# Patient Record
Sex: MALE | Race: WHITE | NOT HISPANIC OR LATINO | Employment: OTHER | URBAN - METROPOLITAN AREA
[De-identification: names, ages, dates, MRNs, and addresses within clinical notes are randomized per-mention and may not be internally consistent; named-entity substitution may affect disease eponyms.]

---

## 2017-08-04 PROCEDURE — 99283 EMERGENCY DEPT VISIT LOW MDM: CPT

## 2017-08-04 ASSESSMENT — PAIN SCALES - GENERAL: PAINLEVEL_OUTOF10: 0

## 2017-08-05 ENCOUNTER — HOSPITAL ENCOUNTER (EMERGENCY)
Facility: MEDICAL CENTER | Age: 77
End: 2017-08-05
Attending: EMERGENCY MEDICINE
Payer: MEDICARE

## 2017-08-05 VITALS
TEMPERATURE: 97.3 F | RESPIRATION RATE: 18 BRPM | HEIGHT: 67 IN | DIASTOLIC BLOOD PRESSURE: 128 MMHG | HEART RATE: 78 BPM | BODY MASS INDEX: 28.06 KG/M2 | SYSTOLIC BLOOD PRESSURE: 217 MMHG | WEIGHT: 178.79 LBS | OXYGEN SATURATION: 98 %

## 2017-08-05 DIAGNOSIS — S01.01XA SCALP LACERATION, INITIAL ENCOUNTER: ICD-10-CM

## 2017-08-05 PROCEDURE — 304999 HCHG REPAIR-SIMPLE/INTERMED LEVEL 1

## 2017-08-05 PROCEDURE — 305308 HCHG STAPLER,SKIN,DISP.

## 2017-08-05 PROCEDURE — 304217 HCHG IRRIGATION SYSTEM

## 2017-08-05 NOTE — ED NOTES
ERP aware of BP but stated pt was okay to go and will speak with PCP about BP this week.  Pt ambulated well on own out of ER

## 2017-08-05 NOTE — ED NOTES
.  Chief Complaint   Patient presents with   • T-5000 GLF     While loading speakers at work, hit head on van side door railing. Small lac to top of head with gooseegg.  RA.  A/o*4.  -LOC.  -Blood thinners.     Patient educated on triage process and wait time.  Instructed to notify staff for worsening or new symptoms.  Placed in lobby.

## 2017-08-05 NOTE — ED AVS SNAPSHOT
8/5/2017    Morris Deng  [address]  [place] [address]    Dear Morris:    [facility] wants to ensure your discharge home is safe and you or your loved ones have had all of your questions answered regarding your care after you leave the hospital.    Below is a list of resources and contact information should you have any questions regarding your hospital stay, follow-up instructions, or active medical symptoms.    Questions or Concerns Regarding… Contact   Medical Questions Related to Your Discharge  (7 days a week, 8am-5pm) Contact a Nurse Care Coordinator   [phone]   Medical Questions Not Related to Your Discharge  (24 hours a day / 7 days a week)  Contact the Nurse Health Line   [phone]    Medications or Discharge Instructions Refer to your discharge packet   or contact your [facility] Primary Care Provider   [phone]   Follow-up Appointment(s) Schedule your appointment via [employer]   or contact Scheduling [phone]   Billing Review your statement via [employer]  or contact Billing [phone]   Medical Records Review your records via [employer]   or contact Medical Records [phone]     You may receive a telephone call within two days of discharge. This call is to make certain you understand your discharge instructions and have the opportunity to have any questions answered. You can also easily access your medical information, test results and upcoming appointments via the [employer] free online health management tool. You can learn more and sign up at [email or web address]. For assistance setting up your [employer] account, please call [phone].    Once again, we want to ensure your discharge home is safe and that you have a clear understanding of any next steps in your care. If you have any questions or concerns, please do not hesitate to contact us, we are here for you. Thank you for choosing [facility] for your healthcare needs.    Sincerely,    Your [facility] Healthcare Team

## 2017-08-05 NOTE — ED AVS SNAPSHOT
Meeps Access Code: [email or web address]  Expires: 9/4/2017  2:12 AM    Your email address is not on file at [employer].  Email Addresses are required for you to sign up for Meeps, please contact [phone] to verify your personal information and to provide your email address prior to attempting to register for Meeps.    Morris Deng  [address] [place] [place] [address]    Meeps  A secure, online tool to manage your health information     [employer]’s Meeps® is a secure, online tool that connects you to your personalized health information from the privacy of your home -- day or night - making it very easy for you to manage your healthcare. Once the activation process is completed, you can even access your medical information using the Meeps aide, which is available for free in the Apple Aide store or Google Play store.     To learn more about Meeps, visit [email or web address]    There are two levels of access available (as shown below):   My Chart Features  [facility] Primary Care Doctor [facility]  Specialists [facility]  Urgent  Care Non-[facility] Primary Care Doctor   Email your healthcare team securely and privately 24/7 X X X    Manage appointments: schedule your next appointment; view details of past/upcoming appointments X      Request prescription refills. X      View recent personal medical records, including lab and immunizations X X X X   View health record, including health history, allergies, medications X X X X   Read reports about your outpatient visits, procedures, consult and ER notes X X X X   See your discharge summary, which is a recap of your hospital and/or ER visit that includes your diagnosis, lab results, and care plan X X  X     How to register for [employer]:  Once your e-mail address has been verified, follow the following steps to sign up for [employer].     1. Go to  [email or web address]  2. Click on the Sign Up Now box, which takes you to the New Member Sign Up  page. You will need to provide the following information:  a. Enter your [employer] Access Code exactly as it appears at the top of this page. (You will not need to use this code after you’ve completed the sign-up process. If you do not sign up before the expiration date, you must request a new code.)   b. Enter your date of birth.   c. Enter your home email address.   d. Click Submit, and follow the next screen’s instructions.  3. Create a [employer] ID. This will be your [employer] login ID and cannot be changed, so think of one that is secure and easy to remember.  4. Create a [employer] password. You can change your password at any time.  5. Enter your Password Reset Question and Answer. This can be used at a later time if you forget your password.   6. Enter your e-mail address. This allows you to receive e-mail notifications when new information is available in [employer].  7. Click Sign Up. You can now view your health information.    For assistance activating your [employer] account, call [phone]

## 2017-08-05 NOTE — ED PROVIDER NOTES
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 Small lac to top of head with gooseegg.  RA.  A/o*4.  -LOC.  -Blood thinners.       HPI  Morris Deng is a 77 y.o. male who presents for evaluation of scalp laceration. The patient states that he was transferring a speaker to his van after finishing music gag when he tripped on his feet, and impacted his head along the sliding door railing in his van. The patient denies loss of consciousness, denies a history of being on any anticoagulants, and denies headache. He does however state that he has a bit of bleeding over his scalp and due to this decided to present here for evaluation. Notably the patient states that he is up-to-date on his tetanus shot.    REVIEW OF SYSTEMS  See HPI for further details. All other systems are negative.     PAST MEDICAL HISTORY   has a past medical history of Cancer and Hypertension.    SOCIAL HISTORY  Social History     Social History Main Topics   • Smoking status: Former Smoker   • Smokeless tobacco: Not on file      Comment: quit 23 yrs ago   • Alcohol Use: Yes   • Drug Use: No   • Sexual Activity: Not on file       SURGICAL HISTORY   has past surgical history that includes other abdominal surgery.    CURRENT MEDICATIONS  Home Medications     **Home medications have not yet been reviewed for this encounter**          ALLERGIES  Allergies   Allergen Reactions   • Peanut-Derived Anaphylaxis   • Food      Cereals: Barley, Buckwheat , corn, oat, wheat   Meats: turkey  Fruits: apple, cantaloupe/ muskmelon, grape white , lemon, orange, peach, pear, watermelon.  Vegetables: Bean, green; bean, lima; cabbage, carrot,celery, lettuce, pea, pepper, green; soybean  Seafood: lobster , Oyster, shrimp  Nuts/ seeds: Hudson, Cashew, coconut, Filbert/Hazelnut,Peanut, Pecan, Pistachio nut,Sesame seed/oil, walnut,  "flaxseed  Spices/other: Chocolate/ cacao bean, cumin, dill, garlic, mustard,pepper,Histamine control     • Iodine        PHYSICAL EXAM  VITAL SIGNS: /105 mmHg  Pulse 83  Temp(Src) 36.3 °C (97.3 °F) (Temporal)  Resp 18  Ht 1.702 m (5' 7.01\")  Wt 81.1 kg (178 lb 12.7 oz)  BMI 28.00 kg/m2  SpO2 96%   Pulse ox interpretation: I interpret this pulse ox as normal.  Constitutional: Alert in no apparent distress.  HENT: Normocephalic, the vertex of the scalp has a 2 cm laceration over the top, Bilateral external ears normal. Nose normal.   Eyes: Pupils are equal and reactive. Conjunctiva normal, non-icteric.   Heart: Regular rate and rythm.  Lungs: No audible wheezing, no increased work of breathing, no accessory muscle use.  Abdomen: Soft, non-distended, non-tender   Skin: Warm, Dry, No erythema, No rash.   Neurologic: Alert, Grossly non-focal.   Psychiatric: Affect normal, Judgment normal, Mood normal, appears alert and not intoxicated.     Laceration Repair Procedure Note    Indication: Laceration    Procedure: The patient was placed in the appropriate position and anesthesia around the laceration was obtained by infiltration using 1% Lidocaine with epinephrine. The area was then irrigated with high pressure normal saline. The laceration was closed with staples. There were no additional lacerations requiring repair. The wound area was then dressed with a bandage.      Total repaired wound length: 2 cm.     Other Items: Suture count: 3    The patient tolerated the procedure well.    Complications: None    COURSE & MEDICAL DECISION MAKING  Pertinent Labs & Imaging studies reviewed. (See chart for details)    Patient presenting here for scalp laceration after impacting his head against a door jamb. Here the patient is well in appearance, has no physical findings suggestive of serious intracranial injury or even a postconcussive syndrome. The patient does however have a laceration over the top of his scalp which " was repaired as per above. The patient tolerated this well, and he'll be discharged with instructions to return here or to his primary care doctor in 1 week for suture removal.    The patient will not drink alcohol nor drive with prescribed medications. The patient will return for worsening symptoms and is stable at the time of discharge. The patient verbalizes understanding and will comply.    The patient is referred to a primary physician for blood pressure management, diabetic screening, and for all other preventative health concerns, should they be present.    FINAL IMPRESSION  1. Scalp laceration  2. Mechanical ground-level fall  3.         Electronically signed by: Donald Ingram, 8/5/2017 2:09 AM      This record was made with a voice recognition software. I have tried to correct any grammar, spelling or context errors to the best of my ability, but errors may still remain. Interpretation of this chart should be taken in this context.

## 2017-08-05 NOTE — DISCHARGE INSTRUCTIONS
Head Injury, Adult  You have a head injury. Headaches and throwing up (vomiting) are common after a head injury. It should be easy to wake up from sleeping. Sometimes you must stay in the hospital. Most problems happen within the first 24 hours. Side effects may occur up to 7-10 days after the injury.   WHAT ARE THE TYPES OF HEAD INJURIES?  Head injuries can be as minor as a bump. Some head injuries can be more severe. More severe head injuries include:  · A jarring injury to the brain (concussion).  · A bruise of the brain (contusion). This mean there is bleeding in the brain that can cause swelling.  · A cracked skull (skull fracture).  · Bleeding in the brain that collects, clots, and forms a bump (hematoma).  WHEN SHOULD I GET HELP RIGHT AWAY?   · You are confused or sleepy.  · You cannot be woken up.  · You feel sick to your stomach (nauseous) or keep throwing up (vomiting).  · Your dizziness or unsteadiness is getting worse.  · You have very bad, lasting headaches that are not helped by medicine. Take medicines only as told by your doctor.  · You cannot use your arms or legs like normal.  · You cannot walk.  · You notice changes in the black spots in the center of the colored part of your eye (pupil).  · You have clear or bloody fluid coming from your nose or ears.  · You have trouble seeing.  During the next 24 hours after the injury, you must stay with someone who can watch you. This person should get help right away (call 911 in the [place] if you start to shake and are not able to control it (have seizures), you pass out, or you are unable to wake up.  HOW CAN I PREVENT A HEAD INJURY IN THE FUTURE?  · Wear seat belts.  · Wear a helmet while bike riding and playing sports like football.  · Stay away from dangerous activities around the house.  WHEN CAN I RETURN TO NORMAL ACTIVITIES AND ATHLETICS?  See your doctor before doing these activities. You should not do normal activities or play contact sports until 1  week after the following symptoms have stopped:  · Headache that does not go away.  · Dizziness.  · Poor attention.  · Confusion.  · Memory problems.  · Sickness to your stomach or throwing up.  · Tiredness.  · Fussiness.  · Bothered by bright lights or loud noises.  · Anxiousness or depression.  · Restless sleep.  MAKE SURE YOU:   · Understand these instructions.  · Will watch your condition.  · Will get help right away if you are not doing well or get worse.     This information is not intended to replace advice given to you by your health care provider. Make sure you discuss any questions you have with your health care provider.     Document Released: 11/30/2009 Document Revised: 01/08/2016 Document Reviewed: 08/25/2014  [employer] Interactive Patient Education ©2016 Elsevier Inc.

## 2017-08-05 NOTE — ED AVS SNAPSHOT
Home Care Instructions                                                                                                                Morris Deng   MRN: [id number]    Department:  [facility], Emergency Dept   Date of Visit:  8/4/2017            [facility], Emergency Dept    [address]    [place] [address]    Phone:  [phone]      You were seen by     Donald Ingram M.D.      Your Diagnosis Was     Scalp laceration, initial encounter     S01.01XA       Follow-up Information     1. Schedule an appointment as soon as possible for a visit with Blayne Day M.D..    Specialty:  Family Medicine    Contact information    [address] [address]  [phone]        Medication Information     Review all of your home medications and newly ordered medications with your primary doctor and/or pharmacist as soon as possible. Follow medication instructions as directed by your doctor and/or pharmacist.     Please keep your complete medication list with you and share with your physician. Update the information when medications are discontinued, doses are changed, or new medications (including over-the-counter products) are added; and carry medication information at all times in the event of emergency situations.               Medication List      ASK your doctor about these medications        Instructions    Morning Afternoon Evening Bedtime    amlodipine 5 MG Tabs   Commonly known as:  NORVASC        Take 1 Tab by mouth every day.   Dose:  5 mg                        aspirin 81 MG Chew chewable tablet   Commonly known as:  ASA        Take 81 mg by mouth every day.   Dose:  81 mg                        diphenhydrAMINE 25 MG Tabs   Commonly known as:  BENADRYL        Take 1 Each by mouth every 8 hours.   Dose:  25 mg                        * EPINEPHrine 0.3 MG/0.3ML Valarie        2 Applicators by Injection route as needed.   Dose:  2 Applicator                       * EPINEPHrine 0.3 MG/0.3ML Soaj solution for injection   Commonly known as:  EPIPEN        1 Syringe by Injection route Once PRN (for severe allergic reaction) for up to 1 dose.   Dose:  1 Syringe                        ezetimibe 10 MG Tabs   Commonly known as:  ZETIA        Take 10 mg by mouth every day.   Dose:  10 mg                        omeprazole 20 MG delayed-release capsule   Commonly known as:  PRILOSEC        Take 20 mg by mouth every day.   Dose:  20 mg                        predniSONE 20 MG Tabs   Commonly known as:  DELTASONE        20mg tablet daily for 3 days starting 12/14/16.                        * Notice:  This list has 2 medication(s) that are the same as other medications prescribed for you. Read the directions carefully, and ask your doctor or other care provider to review them with you.              Discharge Instructions       Head Injury, Adult  You have a head injury. Headaches and throwing up (vomiting) are common after a head injury. It should be easy to wake up from sleeping. Sometimes you must stay in the hospital. Most problems happen within the first 24 hours. Side effects may occur up to 7-10 days after the injury.   WHAT ARE THE TYPES OF HEAD INJURIES?  Head injuries can be as minor as a bump. Some head injuries can be more severe. More severe head injuries include:  · A jarring injury to the brain (concussion).  · A bruise of the brain (contusion). This mean there is bleeding in the brain that can cause swelling.  · A cracked skull (skull fracture).  · Bleeding in the brain that collects, clots, and forms a bump (hematoma).  WHEN SHOULD I GET HELP RIGHT AWAY?   · You are confused or sleepy.  · You cannot be woken up.  · You feel sick to your stomach (nauseous) or keep throwing up (vomiting).  · Your dizziness or unsteadiness is getting worse.  · You have very bad, lasting headaches that are not helped by medicine. Take medicines only as told by your doctor.  · You cannot  use your arms or legs like normal.  · You cannot walk.  · You notice changes in the black spots in the center of the colored part of your eye (pupil).  · You have clear or bloody fluid coming from your nose or ears.  · You have trouble seeing.  During the next 24 hours after the injury, you must stay with someone who can watch you. This person should get help right away (call 911 in the [place] if you start to shake and are not able to control it (have seizures), you pass out, or you are unable to wake up.  HOW CAN I PREVENT A HEAD INJURY IN THE FUTURE?  · Wear seat belts.  · Wear a helmet while bike riding and playing sports like football.  · Stay away from dangerous activities around the house.  WHEN CAN I RETURN TO NORMAL ACTIVITIES AND ATHLETICS?  See your doctor before doing these activities. You should not do normal activities or play contact sports until 1 week after the following symptoms have stopped:  · Headache that does not go away.  · Dizziness.  · Poor attention.  · Confusion.  · Memory problems.  · Sickness to your stomach or throwing up.  · Tiredness.  · Fussiness.  · Bothered by bright lights or loud noises.  · Anxiousness or depression.  · Restless sleep.  MAKE SURE YOU:   · Understand these instructions.  · Will watch your condition.  · Will get help right away if you are not doing well or get worse.     This information is not intended to replace advice given to you by your health care provider. Make sure you discuss any questions you have with your health care provider.     Document Released: 11/30/2009 Document Revised: 01/08/2016 Document Reviewed: 08/25/2014  Elsevier Interactive Patient Education ©2016 Elsevier Inc.            Patient Information     Patient Information    Following emergency treatment: all patient requiring follow-up care must return either to a private physician or a clinic if your condition worsens before you are able to obtain further medical attention, please return to  the emergency room.     Billing Information    At [facility], we work to make the billing process streamlined for our patients.  Our Representatives are here to answer any questions you may have regarding your hospital bill.  If you have insurance coverage and have supplied your insurance information to us, we will submit a claim to your insurer on your behalf.  Should you have any questions regarding your bill, we can be reached online or by phone as follows:  Online: You are able pay your bills online or live chat with our representatives about any billing questions you may have. We are here to help Monday - Friday from 8:00am to 7:30pm and 9:00am - 12:00pm on Saturdays.  Please visit [email or web address]  for more information.   Phone:  [phone] or [phone]    Please note that your emergency physician, surgeon, pathologist, radiologist, anesthesiologist, and other specialists are not employed by [facility] and will therefore bill separately for their services.  Please contact them directly for any questions concerning their bills at the numbers below:     Emergency Physician Services:  [phone]  [facility]:  [phone]  [facility]:  [phone]  [facility]:  [phone]    1. Your final bill may vary from the amount quoted upon discharge if all procedures are not complete at that time, or if your doctor has additional procedures of which we are not aware. You will receive an additional bill if you return to the Emergency Department at [facility] for suture removal regardless of the facility of which the sutures were placed.     2. Please arrange for settlement of this account at the emergency registration.    3. All self-pay accounts are due in full at the time of treatment.  If you are unable to meet this obligation then payment is expected within 4-5 days.     4. If you have had radiology studies (CT, X-ray,  Ultrasound, MRI), you have received a preliminary result during your emergency department visit. Please contact the radiology department [phone] to receive a copy of your final result. Please discuss the Final result with your primary physician or with the follow up physician provided.     Crisis Hotline:  [place] Crisis Hotline:  1-778-LGMYBJT or 1-731.762.5951  [place] Crisis Hotline:    1-478.914.5918 or 881-401-8764         ED Discharge Follow Up Questions    1. In order to provide you with very good care, we would like to follow up with a phone call in the next few days.  May we have your permission to contact you?     YES /  NO    2. What is the best phone number to call you? (       )_____-__________    3. What is the best time to call you?      Morning  /  Afternoon  /  Evening                   Patient Signature:  ____________________________________________________________    Date:  ____________________________________________________________

## 2019-09-05 ENCOUNTER — HOSPITAL ENCOUNTER (EMERGENCY)
Facility: MEDICAL CENTER | Age: 79
End: 2019-09-05
Attending: EMERGENCY MEDICINE
Payer: MEDICARE

## 2019-09-05 VITALS
OXYGEN SATURATION: 91 % | WEIGHT: 175.71 LBS | TEMPERATURE: 97.8 F | HEIGHT: 66 IN | BODY MASS INDEX: 28.24 KG/M2 | SYSTOLIC BLOOD PRESSURE: 139 MMHG | HEART RATE: 68 BPM | DIASTOLIC BLOOD PRESSURE: 76 MMHG | RESPIRATION RATE: 23 BRPM

## 2019-09-05 DIAGNOSIS — T78.40XA ALLERGIC REACTION, INITIAL ENCOUNTER: ICD-10-CM

## 2019-09-05 DIAGNOSIS — T78.3XXA ANGIOEDEMA, INITIAL ENCOUNTER: ICD-10-CM

## 2019-09-05 PROCEDURE — 96375 TX/PRO/DX INJ NEW DRUG ADDON: CPT

## 2019-09-05 PROCEDURE — 700111 HCHG RX REV CODE 636 W/ 250 OVERRIDE (IP): Performed by: EMERGENCY MEDICINE

## 2019-09-05 PROCEDURE — 96374 THER/PROPH/DIAG INJ IV PUSH: CPT

## 2019-09-05 PROCEDURE — 99284 EMERGENCY DEPT VISIT MOD MDM: CPT

## 2019-09-05 RX ORDER — METHYLPREDNISOLONE SODIUM SUCCINATE 125 MG/2ML
125 INJECTION, POWDER, LYOPHILIZED, FOR SOLUTION INTRAMUSCULAR; INTRAVENOUS ONCE
Status: COMPLETED | OUTPATIENT
Start: 2019-09-05 | End: 2019-09-05

## 2019-09-05 RX ADMIN — METHYLPREDNISOLONE SODIUM SUCCINATE 125 MG: 125 INJECTION, POWDER, FOR SOLUTION INTRAMUSCULAR; INTRAVENOUS at 19:35

## 2019-09-05 RX ADMIN — FAMOTIDINE 20 MG: 10 INJECTION INTRAVENOUS at 19:34

## 2019-09-06 NOTE — ED NOTES
Presents to ED s/p allergic reaction to food. Reports ate chicken wings w/ kirill sauce pta and began to exhibit lip/tongue swelling. States used epi pen and took 25mg of benadryl and is feeling an improvement. Denies difficulty breathing/difficulty swallowing. Moderate tongue/lip swelling noted. No resp distress observed.

## 2019-09-06 NOTE — ED PROVIDER NOTES
ED Provider Note    CHIEF COMPLAINT  Chief Complaint   Patient presents with   • Tongue Swelling   • Allergic Reaction       HPI  Morris Deng is a 79 y.o. male who presents for evaluation of an allergic reaction.  He states his symptoms came on after eating pizza and chicken wings.  He noted to have swelling to his tongue and lips.  He took some Benadryl and gave himself an injection with his EpiPen.  He states that at this point his tongue is getting smaller and he seems to be doing better.  He has no difficulty breathing at this time.  He has had multiple episodes of allergic reactions in the past.    REVIEW OF SYSTEMS  See HPI for further details. All other systems negative.    PAST MEDICAL HISTORY  Past Medical History:   Diagnosis Date   • Cancer (HCC)     colon   • Hypertension        FAMILY HISTORY  No family history on file.    SOCIAL HISTORY  Social History     Socioeconomic History   • Marital status:      Spouse name: Not on file   • Number of children: Not on file   • Years of education: Not on file   • Highest education level: Not on file   Occupational History   • Not on file   Social Needs   • Financial resource strain: Not on file   • Food insecurity:     Worry: Not on file     Inability: Not on file   • Transportation needs:     Medical: Not on file     Non-medical: Not on file   Tobacco Use   • Smoking status: Former Smoker   • Smokeless tobacco: Never Used   • Tobacco comment: quit 23 yrs ago   Substance and Sexual Activity   • Alcohol use: Yes     Comment: daily cocktail (vodka rocks)   • Drug use: No   • Sexual activity: Not on file   Lifestyle   • Physical activity:     Days per week: Not on file     Minutes per session: Not on file   • Stress: Not on file   Relationships   • Social connections:     Talks on phone: Not on file     Gets together: Not on file     Attends Sabianism service: Not on file     Active member of club or organization: Not on file     Attends meetings of  "clubs or organizations: Not on file     Relationship status: Not on file   • Intimate partner violence:     Fear of current or ex partner: Not on file     Emotionally abused: Not on file     Physically abused: Not on file     Forced sexual activity: Not on file   Other Topics Concern   • Not on file   Social History Narrative   • Not on file       SURGICAL HISTORY  Past Surgical History:   Procedure Laterality Date   • OTHER ABDOMINAL SURGERY      colon resection       CURRENT MEDICATIONS  Home Medications    **Home medications have not yet been reviewed for this encounter**         ALLERGIES  Allergies   Allergen Reactions   • Peanut-Derived Anaphylaxis   • Food      Cereals: Barley, Buckwheat , corn, oat, wheat   Meats: turkey  Fruits: apple, cantaloupe/ muskmelon, grape white , lemon, orange, peach, pear, watermelon.  Vegetables: Bean, green; bean, lima; cabbage, carrot,celery, lettuce, pea, pepper, green; soybean  Seafood: lobster , Oyster, shrimp  Nuts/ seeds: [place], Cashew, coconut, Filbert/Hazelnut,Peanut, Pecan, Pistachio nut,Sesame seed/oil, walnut, flaxseed  Spices/other: Chocolate/ cacao bean, cumin, dill, garlic, mustard,pepper,Histamine control     • Iodine        PHYSICAL EXAM  VITAL SIGNS: BP (!) 179/84   Pulse 71   Temp 36.6 °C (97.8 °F) (Temporal)   Resp 18   Ht 1.676 m (5' 6\")   Wt 79.7 kg (175 lb 11.3 oz)   SpO2 95%   BMI 28.36 kg/m²   Constitutional: Well developed, Well nourished, No acute distress, Non-toxic appearance.   HENT: Normocephalic, Atraumatic, slight lower lip edema.  Oral exam shows no trismus.  Tongue shows slight edema as does the uvula.  Airway is grossly patent.  Floor of the mouth is soft.    Eyes:  EOMI, Conjunctiva normal, No discharge.   Neck:  No stridor.   Cardiovascular: Normal heart rate, Normal rhythm, No murmurs, No rubs, No gallops.   Thorax & Lungs: Clear to auscultation without wheezes, rales, or rhonchi.    Skin: Warm, Dry.  No rashes.  Musculoskeletal: " Good range of motion in all major joints.  Neurologic: Awake and alert, No focal deficits noted.       COURSE & MEDICAL DECISION MAKING  Pertinent Labs & Imaging studies reviewed. (See chart for details)  This is a 79-year-old here for evaluation of an allergic reaction.  He ate pizza and chicken wings tonight and then developed swelling to his tongue and lips.  He gave himself an injection with his EpiPen and took Benadryl.  He states is actually much better at the time of my evaluation.  He still has some lower lip edema and tongue and uvula edema although his airway is grossly patent and he has no stridor or shortness of breath.  He is treated with Solu-Medrol here.  The patient is observed in the emergency department.  Upon repeat evaluation he states he is greatly improved with only a very small amount of edema to his lower lip.  At this point I think he is fine for discharge home.  I will have him return for any worsening symptoms.  He will follow-up with his primary care provider as needed.  He is given a discharge instruction sheet on allergic reactions and angioedema.    FINAL IMPRESSION  1.  Allergic reaction  2.  Angioedema  3.         Electronically signed by: Kevan Valles, 9/5/2019 7:23 PM

## 2019-09-06 NOTE — ED TRIAGE NOTES
"Patient BiB friend for tongue and lip swelling. He has significant hx of anaphylaxis. He took epi pen and benadryl PTA. He states 'Its gotten better now, I think the epi is working.\" Airway is intact.   "

## 2019-09-24 ENCOUNTER — TELEPHONE (OUTPATIENT)
Dept: SCHEDULING | Facility: IMAGING CENTER | Age: 79
End: 2019-09-24

## 2019-11-07 ENCOUNTER — OFFICE VISIT (OUTPATIENT)
Dept: CARDIOLOGY | Facility: MEDICAL CENTER | Age: 79
End: 2019-11-07
Payer: MEDICARE

## 2019-11-07 VITALS
OXYGEN SATURATION: 94 % | BODY MASS INDEX: 27.78 KG/M2 | SYSTOLIC BLOOD PRESSURE: 170 MMHG | DIASTOLIC BLOOD PRESSURE: 110 MMHG | HEIGHT: 67 IN | WEIGHT: 177 LBS | HEART RATE: 64 BPM

## 2019-11-07 DIAGNOSIS — I10 ESSENTIAL HYPERTENSION: ICD-10-CM

## 2019-11-07 DIAGNOSIS — R93.1 AGATSTON CORONARY ARTERY CALCIUM SCORE GREATER THAN 400: ICD-10-CM

## 2019-11-07 DIAGNOSIS — E78.5 HYPERLIPIDEMIA, UNSPECIFIED HYPERLIPIDEMIA TYPE: ICD-10-CM

## 2019-11-07 DIAGNOSIS — Z76.89 ENCOUNTER TO ESTABLISH CARE: ICD-10-CM

## 2019-11-07 LAB — EKG IMPRESSION: NORMAL

## 2019-11-07 PROCEDURE — 93000 ELECTROCARDIOGRAM COMPLETE: CPT | Performed by: INTERNAL MEDICINE

## 2019-11-07 PROCEDURE — 99203 OFFICE O/P NEW LOW 30 MIN: CPT | Performed by: INTERNAL MEDICINE

## 2019-11-07 RX ORDER — ATORVASTATIN CALCIUM 80 MG/1
80 TABLET, FILM COATED ORAL DAILY
COMMUNITY
Start: 2019-09-03 | End: 2021-02-02 | Stop reason: SDUPTHER

## 2019-11-07 RX ORDER — CHLORTHALIDONE 25 MG/1
25 TABLET ORAL DAILY
Qty: 90 TAB | Refills: 3 | Status: SHIPPED | OUTPATIENT
Start: 2019-11-07 | End: 2020-03-27 | Stop reason: SDUPTHER

## 2019-11-07 RX ORDER — METOPROLOL SUCCINATE 50 MG/1
TABLET, EXTENDED RELEASE ORAL
COMMUNITY
Start: 2019-08-14 | End: 2019-11-07

## 2019-11-07 RX ORDER — CLOPIDOGREL BISULFATE 75 MG/1
75 TABLET ORAL DAILY
Qty: 90 TAB | Refills: 3 | Status: SHIPPED | OUTPATIENT
Start: 2019-11-07 | End: 2020-12-16 | Stop reason: SDUPTHER

## 2019-11-07 RX ORDER — HYDROCHLOROTHIAZIDE 25 MG/1
TABLET ORAL
COMMUNITY
Start: 2019-08-17 | End: 2019-11-07

## 2019-11-07 RX ORDER — CARVEDILOL 12.5 MG/1
12.5 TABLET ORAL 2 TIMES DAILY WITH MEALS
Qty: 120 TAB | Refills: 3 | Status: SHIPPED | OUTPATIENT
Start: 2019-11-07 | End: 2020-08-03 | Stop reason: SDUPTHER

## 2019-11-07 ASSESSMENT — ENCOUNTER SYMPTOMS
VOMITING: 0
IRREGULAR HEARTBEAT: 0
WEIGHT GAIN: 0
DIZZINESS: 0
WEIGHT LOSS: 0
HEARTBURN: 0
FEVER: 0
FLANK PAIN: 0
SYNCOPE: 0
DYSPNEA ON EXERTION: 0
ALTERED MENTAL STATUS: 0
COUGH: 0
ABDOMINAL PAIN: 0
ORTHOPNEA: 0
DECREASED APPETITE: 0
SHORTNESS OF BREATH: 0
CLAUDICATION: 0
CONSTIPATION: 0
BLURRED VISION: 0
PALPITATIONS: 0
DIARRHEA: 0
DEPRESSION: 0
NAUSEA: 0
PND: 0
BACK PAIN: 0
NEAR-SYNCOPE: 0

## 2019-11-07 NOTE — ASSESSMENT & PLAN NOTE
stg 2. D/c prior meds and start chlorthalidone and carvedilol. Prior reactions to lisinopril and amlodipine.

## 2019-11-07 NOTE — ASSESSMENT & PLAN NOTE
Has slowed down activity due to neuropathy. Unclear if has anginal symptoms because does not exert. Check pharm nuclear stress and echo. Suspects anaphylaxis to asa. Start plavix. Cont high intensity statin.

## 2019-11-07 NOTE — PROGRESS NOTES
Cardiology Note    Chief Complaint   Patient presents with   • Coronary Artery Disease     New patient        History of Present Illness: Morris Deng is a 79 y.o. male PMH HTN, HLD who presents for initial visit.     Prior to coronary scan, no hx of cardiac disease. Was recommended by prior  PMD to check calcium score which came back elevated.     Adds had colon CA, had surgery and chemo. Was told in remission.     Review of Systems   Constitution: Negative for decreased appetite, fever, malaise/fatigue, weight gain and weight loss.   HENT: Negative for congestion and nosebleeds.    Eyes: Negative for blurred vision.   Cardiovascular: Negative for chest pain, claudication, dyspnea on exertion, irregular heartbeat, leg swelling, near-syncope, orthopnea, palpitations, paroxysmal nocturnal dyspnea and syncope.   Respiratory: Negative for cough and shortness of breath.    Endocrine: Negative for cold intolerance and heat intolerance.   Skin: Negative for rash.   Musculoskeletal: Negative for back pain.   Gastrointestinal: Negative for abdominal pain, constipation, diarrhea, heartburn, melena, nausea and vomiting.   Genitourinary: Negative for dysuria, flank pain and hematuria.   Neurological: Negative for dizziness.   Psychiatric/Behavioral: Negative for altered mental status and depression.         Past Medical History:   Diagnosis Date   • Cancer (HCC)     colon   • Hypertension          Past Surgical History:   Procedure Laterality Date   • OTHER ABDOMINAL SURGERY      colon resection         Current Outpatient Medications   Medication Sig Dispense Refill   • atorvastatin (LIPITOR) 80 MG tablet Take 80 mg by mouth every day.     • carvedilol (COREG) 12.5 MG Tab Take 1 Tab by mouth 2 times a day, with meals. 120 Tab 3   • chlorthalidone (HYGROTON) 25 MG Tab Take 1 Tab by mouth every day. 90 Tab 3   • clopidogrel (PLAVIX) 75 MG Tab Take 1 Tab by mouth every day. 90 Tab 3   • diphenhydrAMINE (BENADRYL)  25 MG Tab Take 1 Each by mouth every 8 hours. 10 Tab 0   • ezetimibe (ZETIA) 10 MG TABS Take 10 mg by mouth every day.     • omeprazole (PRILOSEC) 20 MG CPDR Take 20 mg by mouth every day.     • EPINEPHrine 0.3 MG/0.3ML ANNY 2 Applicators by Injection route as needed. 1 Device 1   • EPINEPHrine (EPIPEN) 0.3 MG/0.3ML SOAJ solution for injection 1 Syringe by Injection route Once PRN (for severe allergic reaction) for up to 1 dose. 1 Each 1     No current facility-administered medications for this visit.          Allergies   Allergen Reactions   • Peanut-Derived Anaphylaxis   • Food      Cereals: Barley, Buckwheat , corn, oat, wheat   Meats: turkey  Fruits: apple, cantaloupe/ muskmelon, grape white , lemon, orange, peach, pear, watermelon.  Vegetables: Bean, green; bean, lima; cabbage, carrot,celery, lettuce, pea, pepper, green; soybean  Seafood: lobster , Oyster, shrimp  Nuts/ seeds: [place], Cashew, coconut, Filbert/Hazelnut,Peanut, Pecan, Pistachio nut,Sesame seed/oil, walnut, flaxseed  Spices/other: Chocolate/ cacao bean, cumin, dill, garlic, mustard,pepper,Histamine control     • Iodine          No family history on file.      Social History     Socioeconomic History   • Marital status:      Spouse name: Not on file   • Number of children: Not on file   • Years of education: Not on file   • Highest education level: Not on file   Occupational History   • Not on file   Social Needs   • Financial resource strain: Not on file   • Food insecurity:     Worry: Not on file     Inability: Not on file   • Transportation needs:     Medical: Not on file     Non-medical: Not on file   Tobacco Use   • Smoking status: Former Smoker   • Smokeless tobacco: Never Used   • Tobacco comment: quit 23 yrs ago   Substance and Sexual Activity   • Alcohol use: Yes     Comment: daily cocktail (vodka rocks)   • Drug use: No   • Sexual activity: Not on file   Lifestyle   • Physical activity:     Days per week: Not on file     Minutes  "per session: Not on file   • Stress: Not on file   Relationships   • Social connections:     Talks on phone: Not on file     Gets together: Not on file     Attends Synagogue service: Not on file     Active member of club or organization: Not on file     Attends meetings of clubs or organizations: Not on file     Relationship status: Not on file   • Intimate partner violence:     Fear of current or ex partner: Not on file     Emotionally abused: Not on file     Physically abused: Not on file     Forced sexual activity: Not on file   Other Topics Concern   • Not on file   Social History Narrative   • Not on file         Physical Exam:  Ambulatory Vitals  BP (!) 170/110 (BP Location: Left arm, Patient Position: Sitting, BP Cuff Size: Adult)   Pulse 64   Ht 1.702 m (5' 7\")   Wt 80.3 kg (177 lb)   SpO2 94%    BP Readings from Last 4 Encounters:   11/07/19 (!) 170/110   09/05/19 139/76   08/05/17 (!) 217/128   02/13/16 147/89     Weight/BMI:   Vitals:    11/07/19 1508   BP: (!) 170/110   Weight: 80.3 kg (177 lb)   Height: 1.702 m (5' 7\")    Body mass index is 27.72 kg/m².  Wt Readings from Last 4 Encounters:   11/07/19 80.3 kg (177 lb)   09/05/19 79.7 kg (175 lb 11.3 oz)   08/04/17 81.1 kg (178 lb 12.7 oz)   02/13/16 77.1 kg (170 lb)       Physical Exam   Constitutional: He is oriented to person, place, and time and well-developed, well-nourished, and in no distress. No distress.   HENT:   Head: Normocephalic and atraumatic.   Eyes: Pupils are equal, round, and reactive to light. Conjunctivae are normal.   Neck: Normal range of motion. Neck supple. No JVD present.   Cardiovascular: Normal rate, regular rhythm, normal heart sounds and intact distal pulses. Exam reveals no gallop and no friction rub.   No murmur heard.  Pulmonary/Chest: Effort normal and breath sounds normal. No respiratory distress. He has no wheezes. He has no rales. He exhibits no tenderness.   Abdominal: Soft. Bowel sounds are normal. He exhibits no " distension.   Musculoskeletal:         General: No edema.   Neurological: He is alert and oriented to person, place, and time.   Skin: Skin is warm and dry.   Psychiatric: Affect and judgment normal.       Lab Data Review:  Lab Results   Component Value Date/Time    CHOLSTRLTOT 230 (H) 12/22/2011 11:07 AM     12/22/2011 11:07 AM    HDL 83 12/22/2011 11:07 AM    TRIGLYCERIDE 78 12/22/2011 11:07 AM       Lab Results   Component Value Date/Time    SODIUM 138 02/13/2016 01:05 AM    POTASSIUM 3.3 (L) 02/13/2016 01:05 AM    CHLORIDE 105 02/13/2016 01:05 AM    CO2 22 02/13/2016 01:05 AM    GLUCOSE 99 02/13/2016 01:05 AM    BUN 19 02/13/2016 01:05 AM    CREATININE 1.03 02/13/2016 01:05 AM     CrCl cannot be calculated (Patient's most recent lab result is older than the maximum 7 days allowed.).  Lab Results   Component Value Date/Time    ALKPHOSPHAT 51 02/13/2016 09:35 AM    ASTSGOT 23 02/13/2016 09:35 AM    ALTSGPT 22 02/13/2016 09:35 AM    TBILIRUBIN 0.6 02/13/2016 09:35 AM      Lab Results   Component Value Date/Time    WBC 6.0 02/13/2016 01:05 AM     No results found for: HBA1C  No components found for: TROP      Cardiac Imaging and Procedures Review:    EKG today sinus, first deg AVB    Calcium score - 1592    Medical Decision Making:  Problem List Items Addressed This Visit     Hyperlipidemia     Cont high intensity statin.         Relevant Medications    atorvastatin (LIPITOR) 80 MG tablet    carvedilol (COREG) 12.5 MG Tab    chlorthalidone (HYGROTON) 25 MG Tab    Essential hypertension     stg 2. D/c prior meds and start chlorthalidone and carvedilol. Prior reactions to lisinopril and amlodipine.         Relevant Medications    atorvastatin (LIPITOR) 80 MG tablet    carvedilol (COREG) 12.5 MG Tab    chlorthalidone (HYGROTON) 25 MG Tab    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     Has slowed down activity due to neuropathy. Unclear if has anginal symptoms because does not exert. Check pharm  nuclear stress and echo. Suspects anaphylaxis to asa. Start plavix. Cont high intensity statin.         Relevant Medications    atorvastatin (LIPITOR) 80 MG tablet    carvedilol (COREG) 12.5 MG Tab    chlorthalidone (HYGROTON) 25 MG Tab    Other Relevant Orders    NM-CARDIAC STRESS TEST    EC-ECHOCARDIOGRAM COMPLETE W/O CONT      Other Visit Diagnoses     Encounter to establish care              It was my pleasure to meet with  Ish.

## 2019-11-18 ENCOUNTER — HOSPITAL ENCOUNTER (OUTPATIENT)
Dept: CARDIOLOGY | Facility: MEDICAL CENTER | Age: 79
End: 2019-11-18
Attending: INTERNAL MEDICINE
Payer: MEDICARE

## 2019-11-18 DIAGNOSIS — R93.1 AGATSTON CORONARY ARTERY CALCIUM SCORE GREATER THAN 400: ICD-10-CM

## 2019-11-18 LAB
LV EJECT FRACT  99904: 75
LV EJECT FRACT MOD 2C 99903: 84.09
LV EJECT FRACT MOD 4C 99902: 68.68
LV EJECT FRACT MOD BP 99901: 79.07

## 2019-11-18 PROCEDURE — 93306 TTE W/DOPPLER COMPLETE: CPT | Mod: 26 | Performed by: INTERNAL MEDICINE

## 2019-11-18 PROCEDURE — 93306 TTE W/DOPPLER COMPLETE: CPT

## 2019-11-25 ENCOUNTER — TELEPHONE (OUTPATIENT)
Dept: CARDIOLOGY | Facility: MEDICAL CENTER | Age: 79
End: 2019-11-25

## 2019-11-25 NOTE — TELEPHONE ENCOUNTER
Result Notes for EC-ECHOCARDIOGRAM COMPLETE W/O CONT     Notes recorded by Catalino Samuel M.D. on 11/20/2019 at 10:57 AM PRANEETH Madden, appears normal. Can we forward result to PCP? TY  ------    Notes recorded by Maryanne Mccann R.N. on 11/18/2019 at 5:51 PM PST  F/u VR 2/10/20     Spoke with pt in regards to normal testing. Pt appreciative for the call had no further questions. Reminded pt about follow up on 02-.

## 2020-02-10 ENCOUNTER — APPOINTMENT (OUTPATIENT)
Dept: CARDIOLOGY | Facility: MEDICAL CENTER | Age: 80
End: 2020-02-10
Payer: MEDICARE

## 2020-03-04 ENCOUNTER — TELEPHONE (OUTPATIENT)
Dept: CARDIOLOGY | Facility: MEDICAL CENTER | Age: 80
End: 2020-03-04

## 2020-03-20 ENCOUNTER — HOSPITAL ENCOUNTER (OUTPATIENT)
Dept: RADIOLOGY | Facility: MEDICAL CENTER | Age: 80
End: 2020-03-20
Attending: INTERNAL MEDICINE
Payer: MEDICARE

## 2020-03-20 DIAGNOSIS — R93.1 AGATSTON CORONARY ARTERY CALCIUM SCORE GREATER THAN 400: ICD-10-CM

## 2020-03-20 PROCEDURE — 93018 CV STRESS TEST I&R ONLY: CPT | Performed by: INTERNAL MEDICINE

## 2020-03-20 PROCEDURE — 700111 HCHG RX REV CODE 636 W/ 250 OVERRIDE (IP)

## 2020-03-20 PROCEDURE — A9502 TC99M TETROFOSMIN: HCPCS

## 2020-03-20 PROCEDURE — 78452 HT MUSCLE IMAGE SPECT MULT: CPT | Mod: 26 | Performed by: INTERNAL MEDICINE

## 2020-03-20 RX ORDER — REGADENOSON 0.08 MG/ML
INJECTION, SOLUTION INTRAVENOUS
Status: COMPLETED
Start: 2020-03-20 | End: 2020-03-20

## 2020-03-20 RX ORDER — REGADENOSON 0.08 MG/ML
0.4 INJECTION, SOLUTION INTRAVENOUS ONCE
Status: COMPLETED | OUTPATIENT
Start: 2020-03-20 | End: 2020-03-20

## 2020-03-20 RX ADMIN — REGADENOSON 0.4 MG: 0.08 INJECTION, SOLUTION INTRAVENOUS at 14:04

## 2020-03-24 ENCOUNTER — TELEPHONE (OUTPATIENT)
Dept: CARDIOLOGY | Facility: MEDICAL CENTER | Age: 80
End: 2020-03-24

## 2020-03-24 NOTE — TELEPHONE ENCOUNTER
Morris Redding was returning your call. He would like a call back at: [phone].    Thank you so much,    Dionicio

## 2020-03-25 NOTE — TELEPHONE ENCOUNTER
Spoke to patient and gave him stress results, also scheduled appointment on Friday, patient wants phone appointment.

## 2020-03-27 ENCOUNTER — OFFICE VISIT (OUTPATIENT)
Dept: CARDIOLOGY | Facility: MEDICAL CENTER | Age: 80
End: 2020-03-27
Payer: MEDICARE

## 2020-03-27 VITALS
HEART RATE: 65 BPM | BODY MASS INDEX: 27.47 KG/M2 | HEIGHT: 67 IN | DIASTOLIC BLOOD PRESSURE: 92 MMHG | WEIGHT: 175 LBS | SYSTOLIC BLOOD PRESSURE: 137 MMHG

## 2020-03-27 DIAGNOSIS — I25.10 ASCVD (ARTERIOSCLEROTIC CARDIOVASCULAR DISEASE): ICD-10-CM

## 2020-03-27 DIAGNOSIS — E78.5 HYPERLIPIDEMIA, UNSPECIFIED HYPERLIPIDEMIA TYPE: ICD-10-CM

## 2020-03-27 DIAGNOSIS — I10 ESSENTIAL HYPERTENSION: ICD-10-CM

## 2020-03-27 DIAGNOSIS — R93.1 AGATSTON CORONARY ARTERY CALCIUM SCORE GREATER THAN 400: ICD-10-CM

## 2020-03-27 PROCEDURE — 99999 PR NO CHARGE: CPT | Mod: CR | Performed by: INTERNAL MEDICINE

## 2020-03-27 RX ORDER — CHLORTHALIDONE 50 MG/1
50 TABLET ORAL DAILY
Qty: 90 TAB | Refills: 3 | Status: SHIPPED | OUTPATIENT
Start: 2020-03-27 | End: 2021-04-15

## 2020-03-27 RX ORDER — METFORMIN HYDROCHLORIDE 500 MG/1
TABLET, EXTENDED RELEASE ORAL
COMMUNITY
End: 2021-02-02 | Stop reason: SDUPTHER

## 2020-03-27 ASSESSMENT — ENCOUNTER SYMPTOMS
ORTHOPNEA: 0
BLURRED VISION: 0
HEARTBURN: 0
ABDOMINAL PAIN: 0
BACK PAIN: 0
FLANK PAIN: 0
FEVER: 0
NAUSEA: 0
SYNCOPE: 0
DEPRESSION: 0
WEIGHT GAIN: 0
NEAR-SYNCOPE: 0
VOMITING: 0
DIARRHEA: 0
WEIGHT LOSS: 0
CONSTIPATION: 0
PND: 0
CLAUDICATION: 0
SHORTNESS OF BREATH: 0
PALPITATIONS: 0
ALTERED MENTAL STATUS: 0
DYSPNEA ON EXERTION: 0
COUGH: 0
DIZZINESS: 0
IRREGULAR HEARTBEAT: 0
DECREASED APPETITE: 0

## 2020-03-27 NOTE — PROGRESS NOTES
Cardiology Note    Chief Complaint   Patient presents with   • Hypertension     follow up       History of Present Illness: Morris Deng is a 79 y.o. male PMH HTN, HLD who presents for initial visit.     Prior to coronary scan, no hx of cardiac disease. Was recommended by prior [facility] PMD to check calcium score which came back elevated. No repeat symptoms of chest pain/angina. Tolerating medications. Blood pressure at home persists above 130/80 most days.    Review of Systems   Constitution: Negative for decreased appetite, fever, malaise/fatigue, weight gain and weight loss.   HENT: Negative for congestion and nosebleeds.    Eyes: Negative for blurred vision.   Cardiovascular: Negative for chest pain, claudication, dyspnea on exertion, irregular heartbeat, leg swelling, near-syncope, orthopnea, palpitations, paroxysmal nocturnal dyspnea and syncope.   Respiratory: Negative for cough and shortness of breath.    Endocrine: Negative for cold intolerance and heat intolerance.   Skin: Negative for rash.   Musculoskeletal: Negative for back pain.   Gastrointestinal: Negative for abdominal pain, constipation, diarrhea, heartburn, melena, nausea and vomiting.   Genitourinary: Negative for dysuria, flank pain and hematuria.   Neurological: Negative for dizziness.   Psychiatric/Behavioral: Negative for altered mental status and depression.         Past Medical History:   Diagnosis Date   • Cancer (HCC)     colon   • Hypertension          Past Surgical History:   Procedure Laterality Date   • OTHER ABDOMINAL SURGERY      colon resection         Current Outpatient Medications   Medication Sig Dispense Refill   • metFORMIN ER (GLUCOPHAGE XR) 500 MG TABLET SR 24 HR metformin  mg tablet,extended release 24 hr     • chlorthalidone (HYGROTON) 50 MG Tab Take 1 Tab by mouth every day. 90 Tab 3   • atorvastatin (LIPITOR) 80 MG tablet Take 80 mg by mouth every day.     • carvedilol (COREG) 12.5 MG Tab Take 1 Tab by mouth  2 times a day, with meals. 120 Tab 3   • clopidogrel (PLAVIX) 75 MG Tab Take 1 Tab by mouth every day. 90 Tab 3   • diphenhydrAMINE (BENADRYL) 25 MG Tab Take 1 Each by mouth every 8 hours. 10 Tab 0   • EPINEPHrine (EPIPEN) 0.3 MG/0.3ML SOAJ solution for injection 1 Syringe by Injection route Once PRN (for severe allergic reaction) for up to 1 dose. 1 Each 1   • ezetimibe (ZETIA) 10 MG TABS Take 10 mg by mouth every day.     • omeprazole (PRILOSEC) 20 MG CPDR Take 20 mg by mouth every day.     • EPINEPHrine 0.3 MG/0.3ML ANNY 2 Applicators by Injection route as needed. 1 Device 1     No current facility-administered medications for this visit.          Allergies   Allergen Reactions   • Peanut-Derived Anaphylaxis   • Food      Cereals: Barley, Buckwheat , corn, oat, wheat   Meats: turkey  Fruits: apple, cantaloupe/ muskmelon, grape white , lemon, orange, peach, pear, watermelon.  Vegetables: Bean, green; bean, lima; cabbage, carrot,celery, lettuce, pea, pepper, green; soybean  Seafood: lobster , Oyster, shrimp  Nuts/ seeds: [place], Cashew, coconut, Filbert/Hazelnut,Peanut, Pecan, Pistachio nut,Sesame seed/oil, walnut, flaxseed  Spices/other: Chocolate/ cacao bean, cumin, dill, garlic, mustard,pepper,Histamine control     • Iodine          No family history on file.      Social History     Socioeconomic History   • Marital status:      Spouse name: Not on file   • Number of children: Not on file   • Years of education: Not on file   • Highest education level: Not on file   Occupational History   • Not on file   Social Needs   • Financial resource strain: Not on file   • Food insecurity     Worry: Not on file     Inability: Not on file   • Transportation needs     Medical: Not on file     Non-medical: Not on file   Tobacco Use   • Smoking status: Former Smoker   • Smokeless tobacco: Never Used   • Tobacco comment: quit 23 yrs ago   Substance and Sexual Activity   • Alcohol use: Yes     Comment: daily cocktail  "(pat boone)   • Drug use: No   • Sexual activity: Not on file   Lifestyle   • Physical activity     Days per week: Not on file     Minutes per session: Not on file   • Stress: Not on file   Relationships   • Social connections     Talks on phone: Not on file     Gets together: Not on file     Attends Jewish service: Not on file     Active member of club or organization: Not on file     Attends meetings of clubs or organizations: Not on file     Relationship status: Not on file   • Intimate partner violence     Fear of current or ex partner: Not on file     Emotionally abused: Not on file     Physically abused: Not on file     Forced sexual activity: Not on file   Other Topics Concern   • Not on file   Social History Narrative   • Not on file         Physical Exam:  Ambulatory Vitals  /92 (BP Location: Left arm, Patient Position: Sitting, BP Cuff Size: Adult)   Pulse 65   Ht 1.702 m (5' 7\")   Wt 79.4 kg (175 lb)    BP Readings from Last 4 Encounters:   03/27/20 137/92   11/07/19 (!) 170/110   09/05/19 139/76   08/05/17 (!) 217/128     Weight/BMI:   Vitals:    03/27/20 1557   BP: 137/92   Weight: 79.4 kg (175 lb)   Height: 1.702 m (5' 7\")    Body mass index is 27.41 kg/m².  Wt Readings from Last 4 Encounters:   03/27/20 79.4 kg (175 lb)   11/07/19 80.3 kg (177 lb)   09/05/19 79.7 kg (175 lb 11.3 oz)   08/04/17 81.1 kg (178 lb 12.7 oz)       Physical Exam  Telephone interview    Lab Data Review:  Lab Results   Component Value Date/Time    CHOLSTRLTOT 230 (H) 12/22/2011 11:07 AM     12/22/2011 11:07 AM    HDL 83 12/22/2011 11:07 AM    TRIGLYCERIDE 78 12/22/2011 11:07 AM       Lab Results   Component Value Date/Time    SODIUM 138 02/13/2016 01:05 AM    POTASSIUM 3.3 (L) 02/13/2016 01:05 AM    CHLORIDE 105 02/13/2016 01:05 AM    CO2 22 02/13/2016 01:05 AM    GLUCOSE 99 02/13/2016 01:05 AM    BUN 19 02/13/2016 01:05 AM    CREATININE 1.03 02/13/2016 01:05 AM     CrCl cannot be calculated (Patient's most " recent lab result is older than the maximum 7 days allowed.).  Lab Results   Component Value Date/Time    ALKPHOSPHAT 51 02/13/2016 09:35 AM    ASTSGOT 23 02/13/2016 09:35 AM    ALTSGPT 22 02/13/2016 09:35 AM    TBILIRUBIN 0.6 02/13/2016 09:35 AM      Lab Results   Component Value Date/Time    WBC 6.0 02/13/2016 01:05 AM     No results found for: HBA1C  No components found for: TROP      Cardiac Imaging and Procedures Review:    EKG today sinus, first deg AVB    Calcium score - 1592    Nuclear cindy SPECT 3/20/20  NUCLEAR IMAGING INTERPRETATION   Normal myocardial perfusion with no ischemia.   Normal left ventricular wall motion.  LV ejection fraction = 69%.   ECG INTERPRETATION   Negative stress ECG for ischemia.    TTE 11/2019  CONCLUSIONS  No prior study is available for comparison.   Left ventricular ejection fraction is visually estimated to be greater   than 75%.  Hyperdynamic left ventricular systolic function.  Normal diastolic function.  The right ventricle was normal in size and function.  No significant valve disease or flow abnormalities.   Left Ventricle  Normal left ventricular chamber size. Mild concentric left ventricular   hypertrophy. Hyperdynamic left ventricular systolic function. Left   ventricular ejection fraction is visually estimated to be greater than   75%. Normal regional wall motion. Normal diastolic function.    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 Prior adverse reactions to lisinopril and amlodipine. Continue and increase chlorthalidone. Check BP at home. Can  titrate carvedilol further if still not at goal.     CAD on CT / ASCVD - continue statin and zetia. Check lipids. Continue plavix (allergic to aspirin).    It was my pleasure to meet with Mr. Deng.    This encounter was conducted via telephone.  Verbal consent was obtained. Patient's identity was verified.    21 minutes spent interviewing patient.

## 2020-04-09 ENCOUNTER — HOSPITAL ENCOUNTER (EMERGENCY)
Facility: MEDICAL CENTER | Age: 80
End: 2020-04-09
Attending: EMERGENCY MEDICINE
Payer: MEDICARE

## 2020-04-09 VITALS
WEIGHT: 175.49 LBS | DIASTOLIC BLOOD PRESSURE: 108 MMHG | HEIGHT: 67 IN | OXYGEN SATURATION: 95 % | RESPIRATION RATE: 28 BRPM | BODY MASS INDEX: 27.54 KG/M2 | SYSTOLIC BLOOD PRESSURE: 191 MMHG | TEMPERATURE: 97.8 F | HEART RATE: 75 BPM

## 2020-04-09 DIAGNOSIS — T78.40XA ALLERGIC REACTION, INITIAL ENCOUNTER: ICD-10-CM

## 2020-04-09 PROCEDURE — 700111 HCHG RX REV CODE 636 W/ 250 OVERRIDE (IP): Performed by: EMERGENCY MEDICINE

## 2020-04-09 PROCEDURE — 96372 THER/PROPH/DIAG INJ SC/IM: CPT

## 2020-04-09 PROCEDURE — 96374 THER/PROPH/DIAG INJ IV PUSH: CPT

## 2020-04-09 PROCEDURE — 99285 EMERGENCY DEPT VISIT HI MDM: CPT

## 2020-04-09 RX ORDER — DEXAMETHASONE SODIUM PHOSPHATE 10 MG/ML
10 INJECTION, SOLUTION INTRAMUSCULAR; INTRAVENOUS ONCE
Status: COMPLETED | OUTPATIENT
Start: 2020-04-09 | End: 2020-04-09

## 2020-04-09 RX ORDER — PREDNISONE 20 MG/1
60 TABLET ORAL DAILY
Qty: 9 TAB | Refills: 0 | Status: SHIPPED | OUTPATIENT
Start: 2020-04-09 | End: 2020-04-12

## 2020-04-09 RX ORDER — METHYLPREDNISOLONE 4 MG/1
TABLET ORAL
Qty: 1 PACKAGE | Refills: 0 | Status: SHIPPED | OUTPATIENT
Start: 2020-04-09 | End: 2021-06-01

## 2020-04-09 RX ADMIN — DEXAMETHASONE SODIUM PHOSPHATE 10 MG: 10 INJECTION INTRAMUSCULAR; INTRAVENOUS at 18:41

## 2020-04-10 NOTE — ED TRIAGE NOTES
Pt to er via remsa with c/o allergic reaction after drinking odwalla juice. Pt with known allergy to nuts and developed swelling to face and tongue after drinking same. recvd 25mg po benadryl at home along with epi pain (self administered) pta, with 25mg iv benadryl given by remsa at 1805. erp to bedside, pt with apparent sob, yet able to speak in complete sentences, vs as charted on n/c at 2L

## 2020-04-10 NOTE — ED NOTES
erp to bedside for re eval. Pt continues to states improvement in previous s/s. Vs as charted, assisted to position of comfort on side. Call light in reach, denies further needs

## 2020-04-10 NOTE — ED PROVIDER NOTES
ED Provider Note    CHIEF COMPLAINT  Chief Complaint   Patient presents with   • Allergic Reaction       HPI  Morris Deng is a 80 y.o. male who presents with a chief complaint of allergic reaction.  Patient states he lives reaction sudden suddenly today this evening it states throat swelling and tongue swelling or mouth swelling it was after drinking other walla juice.  He states that improved after he get a self-administered epinephrine pen he also took 25 mg of Benadryl and will receive 25 mg by EMS.  At this point denies any difficulty swallowing or difficulty breathing.  He does have sensation of mouth swelling and tongue swelling that has improved.    Patient does have a history of allergies to peanuts he states that he is had his epinephrine pen left self administer about a month ago he just got off steroids for a dental infection or dental issue.  He states that this is typical for him.      He was screened for COVID-19 risk factors and negative      REVIEW OF SYSTEMS  General: No fever or chills.  Eyes: No eye discharge. No eye pain.  Ear nose throat: See above.  Pulmonary: No shortness of breath or cough.  Cardiovascular: No chest pain or chest pressure.  GI: No abdominal pain nausea or vomiting.  : No dysuria or hematuria  Dermatologic: No rashes. No abrasions.    All other systems are negative      PAST MEDICAL HISTORY  Past Medical History:   Diagnosis Date   • Cancer (HCC)     colon   • Hypertension        FAMILY HISTORY  History reviewed. No pertinent family history.    SOCIAL HISTORY  Social History     Socioeconomic History   • Marital status:      Spouse name: Not on file   • Number of children: Not on file   • Years of education: Not on file   • Highest education level: Not on file   Occupational History   • Not on file   Social Needs   • Financial resource strain: Not on file   • Food insecurity     Worry: Not on file     Inability: Not on file   • Transportation needs      Medical: Not on file     Non-medical: Not on file   Tobacco Use   • Smoking status: Former Smoker   • Smokeless tobacco: Never Used   • Tobacco comment: quit 23 yrs ago   Substance and Sexual Activity   • Alcohol use: Yes     Comment: daily cocktail (vodka rocks)   • Drug use: No   • Sexual activity: Not on file   Lifestyle   • Physical activity     Days per week: Not on file     Minutes per session: Not on file   • Stress: Not on file   Relationships   • Social connections     Talks on phone: Not on file     Gets together: Not on file     Attends Shinto service: Not on file     Active member of club or organization: Not on file     Attends meetings of clubs or organizations: Not on file     Relationship status: Not on file   • Intimate partner violence     Fear of current or ex partner: Not on file     Emotionally abused: Not on file     Physically abused: Not on file     Forced sexual activity: Not on file   Other Topics Concern   • Not on file   Social History Narrative   • Not on file       SURGICAL HISTORY  Past Surgical History:   Procedure Laterality Date   • OTHER ABDOMINAL SURGERY      colon resection       CURRENT MEDICATIONS  Home Medications    **Home medications have not yet been reviewed for this encounter**         ALLERGIES  Allergies   Allergen Reactions   • Peanut-Derived Anaphylaxis   • Food      Cereals: Barley, Buckwheat , corn, oat, wheat   Meats: turkey  Fruits: apple, cantaloupe/ muskmelon, grape white , lemon, orange, peach, pear, watermelon.  Vegetables: Bean, green; bean, lima; cabbage, carrot,celery, lettuce, pea, pepper, green; soybean  Seafood: lobster , Oyster, shrimp  Nuts/ seeds: [place], Cashew, coconut, Filbert/Hazelnut,Peanut, Pecan, Pistachio nut,Sesame seed/oil, walnut, flaxseed  Spices/other: Chocolate/ cacao bean, cumin, dill, garlic, mustard,pepper,Histamine control     • Iodine        PHYSICAL EXAM  VITAL SIGNS: BP (!) 221/112   Pulse 70   Temp 36.6 °C (97.9 °F)  "(Temporal)   Resp (!) 28   Ht 1.702 m (5' 7\")   Wt 79.6 kg (175 lb 7.8 oz)   SpO2 96%   BMI 27.49 kg/m²    Constitutional: Well developed, Well nourished, no acute distress,   HENT: Normocephalic, Atraumatic, Oropharynx moist, No oral exudates, Nose normal.  Oropharynx is swollen the the lower part of floor the mouth is swelling underneath his tongue is in be swelling slight posterior oropharynx swelling eyes: PERRLA, EOMI, Conjunctiva normal, No discharge.   Musculoskeletal: Neck normal range of motion, No tenderness, Supple,  Cardiovascular: Regular rhythm rate of 70 no murmurs  Thorax & Lungs: No stridor no wheezing no rhonchi no rales  Abdomen: Bowel sounds normal, Soft, No tenderness, No masses, No pulsatile masses.   Skin: Warm, Dry, No erythema, No rash.   : No CVA tenderness.   Neurologic: Alert & oriented , moves all extremities equally  Psychiatric:  Calm, not anxious        COURSE & MEDICAL DECISION MAKING  Pertinent Labs & Imaging studies reviewed. (See chart for details)  Very pleasant gentleman who is laughing and we are joking at the time who had significant allergic reaction patient self treated seems to be improving we will go ahead and add Decadron his blood pressure is already start to come down from 2 21-1 90 I suspect this to be side effects of the epinephrine he seems to be asymptomatic in good spirits will observe him to the ER he states that he is been discharged from ER in the past.  We will give some Decadron and consider more Benadryl as needed.    Examined the patient 5 times during his ED course patient continued to have improvement of the swelling of his lower mouth heat hoarseness improved no stridor was noted noted respiratory distress and upon repeat examination at 9:00 PM patient has significant improvement he states that he is used will have swelling for about 3 days in his mouth patient has had no rebound allergic reaction he is taken his epinephrine steroids have now been " administered as well as the Benadryl and the patient be safely discharged home patient be given 3 days of prednisone 60 mg a started on a Medrol Dosepak afterwards this was discussed with him at length he verbalized understanding patient be discharged home in stable condition.  I will get a refill for his EpiPen he states another 1 is home at home.    I does not he has any trouble breathing or swelling he is to call 911 again.  At this point patient observed for about 3 hours no worsening symptoms and in fact improving his symptoms airway stable self-administered and this is something that has happened frequently.    FINAL IMPRESSION  1.  Allergic reaction  2.   3.      Electronically signed by: Talha Bhatia M.D., 4/9/2020 6:38 PM

## 2020-04-10 NOTE — ED NOTES
"Orders recvd with decadron given slow ivp. Pt states \"definetly breathing easier\". Taking water po per erp approval  "

## 2020-04-10 NOTE — ED NOTES
Discharge instructions provided.  Pt verbalized the understanding of discharge instructions to follow up with PCP and to return to ER if condition worsens.  Pt ambulated out of ER without difficulty. Pt missed tonights does of blood pressure meds. ERP explained that he must take his meds asap when he gets home and to check his BP in the morning and to return if condition worsens.

## 2020-06-23 ENCOUNTER — HOSPITAL ENCOUNTER (OUTPATIENT)
Dept: RADIOLOGY | Facility: MEDICAL CENTER | Age: 80
End: 2020-06-23
Attending: FAMILY MEDICINE
Payer: MEDICARE

## 2020-06-23 DIAGNOSIS — D48.5 NEOPLASM OF UNCERTAIN BEHAVIOR OF SKIN: ICD-10-CM

## 2020-06-23 PROCEDURE — 76536 US EXAM OF HEAD AND NECK: CPT

## 2020-06-23 PROCEDURE — 76775 US EXAM ABDO BACK WALL LIM: CPT

## 2020-06-25 NOTE — ADDENDUM NOTE
Addended by: NITISH KRISHNAN on: 6/25/2020 03:44 PM     Modules accepted: Level of Service, SmartSet

## 2020-08-03 DIAGNOSIS — I10 ESSENTIAL HYPERTENSION: Primary | ICD-10-CM

## 2020-08-03 RX ORDER — CARVEDILOL 12.5 MG/1
12.5 TABLET ORAL 2 TIMES DAILY WITH MEALS
Qty: 180 TAB | Refills: 2 | Status: SHIPPED | OUTPATIENT
Start: 2020-08-03 | End: 2021-06-01

## 2020-12-16 DIAGNOSIS — E78.5 HYPERLIPIDEMIA, UNSPECIFIED HYPERLIPIDEMIA TYPE: ICD-10-CM

## 2020-12-17 RX ORDER — CLOPIDOGREL BISULFATE 75 MG/1
75 TABLET ORAL DAILY
Qty: 90 TAB | Refills: 1 | Status: SHIPPED | OUTPATIENT
Start: 2020-12-17 | End: 2021-08-03

## 2020-12-26 ENCOUNTER — TELEPHONE (OUTPATIENT)
Dept: SCHEDULING | Facility: IMAGING CENTER | Age: 80
End: 2020-12-26

## 2021-01-15 DIAGNOSIS — Z23 NEED FOR VACCINATION: ICD-10-CM

## 2021-01-26 ENCOUNTER — APPOINTMENT (OUTPATIENT)
Dept: FAMILY PLANNING/WOMEN'S HEALTH CLINIC | Facility: IMMUNIZATION CENTER | Age: 81
End: 2021-01-26
Attending: INTERNAL MEDICINE
Payer: MEDICARE

## 2021-01-26 ENCOUNTER — IMMUNIZATION (OUTPATIENT)
Dept: FAMILY PLANNING/WOMEN'S HEALTH CLINIC | Facility: IMMUNIZATION CENTER | Age: 81
End: 2021-01-26
Payer: MEDICARE

## 2021-01-26 DIAGNOSIS — Z23 NEED FOR VACCINATION: ICD-10-CM

## 2021-01-26 DIAGNOSIS — Z23 ENCOUNTER FOR VACCINATION: Primary | ICD-10-CM

## 2021-01-26 PROCEDURE — 0001A PFIZER SARS-COV-2 VACCINE: CPT

## 2021-01-26 PROCEDURE — 91300 PFIZER SARS-COV-2 VACCINE: CPT

## 2021-01-28 ENCOUNTER — TELEPHONE (OUTPATIENT)
Dept: MEDICAL GROUP | Facility: PHYSICIAN GROUP | Age: 81
End: 2021-01-28

## 2021-01-28 NOTE — TELEPHONE ENCOUNTER
ESTABLISHED PATIENT PRE-VISIT PLANNING     Patient was NOT contacted to complete PVP.     Note: Patient will not be contacted if there is no indication to call.     1.  Reviewed notes from the last few office visits within the medical group: Yes    2.  If any orders were placed at last visit or intended to be done for this visit (i.e. 6 mos follow-up), do we have Results/Consult Notes?         •  Labs - Labs were not ordered at last office visit.  Note: If patient appointment is for lab review and patient did not complete labs, check with provider if OK to reschedule patient until labs completed.       •  Imaging - Imaging was not ordered at last office visit.       •  Referrals - No referrals were ordered at last office visit.    3. Is this appointment scheduled as a Hospital Follow-Up? No    4.  Immunizations were updated in Epic using Reconcile Outside Information activity? Yes    5.  Patient is due for the following Health Maintenance Topics:   Health Maintenance Due   Topic Date Due   • Annual Wellness Visit  1940   • COLONOSCOPY  03/31/1992   • IMM ZOSTER VACCINES (2 of 3) 09/03/2014   • IMM INFLUENZA (1) 09/01/2020       - Patient plans to schedule appointment for Annual Wellness Visit (AWV), Colonoscopy/FIT and Immunizations: FLU and SHINGRIX (Shingles).    6.  AHA (Pulse8) form printed for Provider? No, patient does not have any open alerts

## 2021-02-02 ENCOUNTER — OFFICE VISIT (OUTPATIENT)
Dept: MEDICAL GROUP | Facility: PHYSICIAN GROUP | Age: 81
End: 2021-02-02
Payer: MEDICARE

## 2021-02-02 VITALS
RESPIRATION RATE: 18 BRPM | BODY MASS INDEX: 26.49 KG/M2 | OXYGEN SATURATION: 97 % | DIASTOLIC BLOOD PRESSURE: 90 MMHG | WEIGHT: 168.8 LBS | SYSTOLIC BLOOD PRESSURE: 136 MMHG | HEIGHT: 67 IN | HEART RATE: 68 BPM | TEMPERATURE: 97 F

## 2021-02-02 DIAGNOSIS — Z85.038 H/O COLON CANCER, STAGE I: ICD-10-CM

## 2021-02-02 DIAGNOSIS — Z12.5 SCREENING FOR PROSTATE CANCER: ICD-10-CM

## 2021-02-02 DIAGNOSIS — R93.1 AGATSTON CORONARY ARTERY CALCIUM SCORE GREATER THAN 400: ICD-10-CM

## 2021-02-02 DIAGNOSIS — E78.49 OTHER HYPERLIPIDEMIA: ICD-10-CM

## 2021-02-02 DIAGNOSIS — K64.8 OTHER HEMORRHOIDS: ICD-10-CM

## 2021-02-02 DIAGNOSIS — E11.00 TYPE 2 DIABETES MELLITUS WITH HYPEROSMOLARITY WITHOUT COMA, WITHOUT LONG-TERM CURRENT USE OF INSULIN (HCC): ICD-10-CM

## 2021-02-02 DIAGNOSIS — G62.9 NEUROPATHY: ICD-10-CM

## 2021-02-02 DIAGNOSIS — I10 ESSENTIAL HYPERTENSION: ICD-10-CM

## 2021-02-02 DIAGNOSIS — I25.10 ASCVD (ARTERIOSCLEROTIC CARDIOVASCULAR DISEASE): ICD-10-CM

## 2021-02-02 DIAGNOSIS — R53.83 OTHER FATIGUE: ICD-10-CM

## 2021-02-02 PROCEDURE — 99204 OFFICE O/P NEW MOD 45 MIN: CPT | Performed by: INTERNAL MEDICINE

## 2021-02-02 RX ORDER — EZETIMIBE 10 MG/1
10 TABLET ORAL DAILY
Qty: 100 TAB | Refills: 1 | Status: SHIPPED | OUTPATIENT
Start: 2021-02-02 | End: 2021-08-17 | Stop reason: SDUPTHER

## 2021-02-02 RX ORDER — ATORVASTATIN CALCIUM 80 MG/1
80 TABLET, FILM COATED ORAL DAILY
Qty: 100 TAB | Refills: 1 | Status: SHIPPED | OUTPATIENT
Start: 2021-02-02 | End: 2021-09-10

## 2021-02-02 RX ORDER — BETAMETHASONE DIPROPIONATE 0.05 %
OINTMENT (GRAM) TOPICAL
Qty: 50 G | Refills: 2 | Status: SHIPPED | OUTPATIENT
Start: 2021-02-02

## 2021-02-02 RX ORDER — OMEPRAZOLE 20 MG/1
20 CAPSULE, DELAYED RELEASE ORAL DAILY
Qty: 100 CAP | Refills: 1 | Status: SHIPPED | OUTPATIENT
Start: 2021-02-02 | End: 2021-09-13 | Stop reason: SDUPTHER

## 2021-02-02 RX ORDER — METFORMIN HYDROCHLORIDE 500 MG/1
TABLET, EXTENDED RELEASE ORAL
Qty: 100 TAB | Refills: 1 | Status: SHIPPED | OUTPATIENT
Start: 2021-02-02 | End: 2021-05-11 | Stop reason: SDUPTHER

## 2021-02-02 SDOH — HEALTH STABILITY: MENTAL HEALTH: HOW OFTEN DO YOU HAVE A DRINK CONTAINING ALCOHOL?: 4 OR MORE TIMES A WEEK

## 2021-02-02 SDOH — HEALTH STABILITY: MENTAL HEALTH: HOW OFTEN DO YOU HAVE 6 OR MORE DRINKS ON ONE OCCASION?: NEVER

## 2021-02-02 SDOH — HEALTH STABILITY: MENTAL HEALTH: HOW MANY STANDARD DRINKS CONTAINING ALCOHOL DO YOU HAVE ON A TYPICAL DAY?: 1 OR 2

## 2021-02-02 ASSESSMENT — PATIENT HEALTH QUESTIONNAIRE - PHQ9: CLINICAL INTERPRETATION OF PHQ2 SCORE: 0

## 2021-02-02 NOTE — PROGRESS NOTES
CC: Establish care  Prior PCP Dr. Wilcox    HPI:  Morris presents with the following    1. Other hemorrhoids  Patient has a longtime history of hemorrhoids.  Patient has noticed more constipation lately.  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      2. 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 ASCVD (arteriosclerotic cardiovascular disease)  Patient is followed by his cardiologist Dr. Samuel.  He is currently being treated with Plavix, Lipitor 80 mg, Zetia 10 mg.  Patient completed a nuclear cardiac stress test, stress EKG, echocardiogram all within normal limits.    4. Essential hypertension  Patient is stable with current medications which include Coreg and chlorthalidone.  Blood pressure stable.    5. H/O colon cancer, stage I  Patient was diagnosed with colon cancer in 1998.  Underwent chemotherapy for 6 months with subsequent colectomy.  Most recent colonoscopy was 4 years ago, due next year.  No issues.    6. Other hyperlipidemia  Treated with Lipitor and Zetia.  Lipid panel due.    7. Type 2 diabetes mellitus with hyperosmolarity without coma, without long-term current use of insulin ([facility])  Diagnosed with mild diabetes and taking Metformin 500 mg daily.  He does not check his blood sugar regularly.  Due for labs.    8.  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  Lower extremity weakness and uses a walker.    9.  History of melanoma  Melanoma diagnosed in 1989.  Patient is followed by Dr. Fernandes his dermatologist yearly.    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 100 Tab 1   • ezetimibe (ZETIA) 10 MG Tab Take 1 Tab by mouth every day. 100 Tab 1   • omeprazole (PRILOSEC) 20 MG delayed-release capsule Take 1 Cap by mouth every day. 100 Cap 1   • betamethasone dipropionate (DIPROLENE) 0.05 % Ointment Apply to affected area twice daily as needed 50 g 2   • clopidogrel (PLAVIX) 75 MG Tab Take 1 Tab by mouth every day. 90 Tab 1   • carvedilol (COREG) 12.5 MG Tab Take 1 Tab by mouth 2 times a day, with meals. 180 Tab 2   • chlorthalidone (HYGROTON) 50 MG Tab Take 1 Tab by mouth every day. 90 Tab 3   • diphenhydrAMINE (BENADRYL) 25 MG Tab Take 1 Each by mouth every 8 hours. 10 Tab 0   • EPINEPHrine (EPIPEN) 0.3 MG/0.3ML SOAJ solution for injection 1 Syringe by Injection route Once PRN (for severe allergic reaction) for up to 1 dose. 1 Each 1   • EPINEPHrine 0.3 MG/0.3ML ANNY 2 Applicators by Injection route as needed. 1 Device 1   • methylPREDNISolone (MEDROL DOSEPAK) 4 MG Tablet Therapy Pack Use as directed (Patient not taking: Reported on 2/2/2021) 1 Package 0     No current facility-administered medications for this visit.          Allergies as of 02/02/2021 - Reviewed 02/02/2021   Allergen Reaction Noted   • Peanut-derived Anaphylaxis 12/01/2014   • Food  02/13/2016   • Iodine  12/03/2009        Social History     Socioeconomic History   • Marital status:      Spouse name: Not on file   • Number of children: Not on file   • Years of education: Not on file   • Highest education level: Not on file   Occupational History   • Not on file   Social Needs   • Financial resource strain: Not on file   • Food insecurity     Worry: Not on file     Inability: Not on file   • Transportation needs     Medical: Not on file     Non-medical: Not on  "file   Tobacco Use   • Smoking status: Former Smoker   • Smokeless tobacco: Never Used   • Tobacco comment: quit 23 yrs ago   Substance and Sexual Activity   • Alcohol use: Yes     Frequency: 4 or more times a week     Drinks per session: 1 or 2     Binge frequency: Never     Comment: daily cocktail (vodka rocks) martini every night   • Drug use: No   • Sexual activity: Not Currently   Lifestyle   • Physical activity     Days per week: Not on file     Minutes per session: Not on file   • Stress: Not on file   Relationships   • Social connections     Talks on phone: Not on file     Gets together: Not on file     Attends Caodaism service: Not on file     Active member of club or organization: Not on file     Attends meetings of clubs or organizations: Not on file     Relationship status: Not on file   • Intimate partner violence     Fear of current or ex partner: Not on file     Emotionally abused: Not on file     Physically abused: Not on file     Forced sexual activity: Not on file   Other Topics Concern   • Not on file   Social History Narrative   • Not on file       No family history on file.    Past Surgical History:   Procedure Laterality Date   • OTHER ABDOMINAL SURGERY      colon resection       ROS:  Denies any Headache,Chest pain,  Shortness of breath,  Abdominal pain, Changes of bowel or bladder, Lower ext edema, Fevers, Nights sweats, Weight Changes, Focal weakness or numbness.  All other systems are negative.    /90 (BP Location: Right arm, Patient Position: Sitting, BP Cuff Size: Adult)   Pulse 68   Temp 36.1 °C (97 °F) (Temporal)   Resp 18   Ht 1.702 m (5' 7\")   Wt 76.6 kg (168 lb 12.8 oz)   SpO2 97%   BMI 26.44 kg/m²      Physical Exam:  Gen:         Alert and oriented, No apparent distress.  HEENT:   Perrla, TM clear,  Oralpharynx no erythema or exudates.  Neck:       No Jugular venous distension, Lymphadenopathy, Thyromegaly, Bruits.  Lungs:     Clear to auscultation bilaterally, no " wheezing rhonchi or crackles.  CV:          Regular rate and rhythm. No murmurs, rubs or gallops.  Abd:         Soft non tender, non distended. Normal active bowel sounds.             Ext:          No clubbing, cyanosis, edema.  Neuro:     Grossly intact    Assessment and Plan.   80 y.o. male presenting with the following.     1. Other hemorrhoids  Stable  Recommend OTC options, sitz bath, fiber    2. 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 ASCVD (arteriosclerotic cardiovascular disease)  Stable, continue current plan of care  Follow-up with cardiology as needed/scheduled    4. Essential hypertension  Stable, continue current plan of care    5. H/O colon cancer, stage I  Stable, follow-up with GI    6. Other hyperlipidemia    - Lipid Profile; Future    7. Type 2 diabetes mellitus with hyperosmolarity without coma, without long-term current use of insulin (HCC)    - HEMOGLOBIN A1C; Future  - Comp Metabolic Panel; Future  - CBC WITH DIFFERENTIAL; Future    8. Screening for prostate cancer    - PROSTATE SPECIFIC AG SCREENING; Future    9. Other fatigue    - TSH; Future  - VITAMIN B12; Future  - FOLATE; Future    10. Neuropathy  Stable continue to monitor

## 2021-02-02 NOTE — LETTER
[facility]  Debbie Clancy M.D.  [address] [place] [address]  [facility] [address]  Fax: [phone]   Authorization for Release/Disclosure of   Protected Health Information   Name: SURINDER SHEPHERD : 1940 SSN: [id number]   Address: [address] [place] [address]  [address]  [facility] [address] Phone:    [phone] (home)    I authorize the entity listed below to release/disclose the PHI below to:   [facility]/Debbie Clancy M.D. and Debbie Clancy M.D.   Provider or Entity Name:  [facility]   Address   City, [facility], Zip:               [address] [place] [address], [place] [address]   Phone:  [phone]      Fax:      [phone]        Reason for request: continuity of care   Information to be released:    [ X ] LAST COLONOSCOPY,  including any PATH REPORT and follow-up  [ X ] LAST FIT/COLOGUARD RESULT [  ] LAST DEXA  [  ] LAST MAMMOGRAM  [  ] LAST PAP  [  ] LAST LABS [  ] RETINA EXAM REPORT  [  ] IMMUNIZATION RECORDS  [  ] Release all info      [  ] Check here and initial the line next to each item to release ALL health information INCLUDING  _____ Care and treatment for drug and / or alcohol abuse  _____ HIV testing, infection status, or AIDS  _____ Genetic Testing    DATES OF SERVICE OR TIME PERIOD TO BE DISCLOSED: _____________  I understand and acknowledge that:  * This Authorization may be revoked at any time by you in writing, except if your health information has already been used or disclosed.  * Your health information that will be used or disclosed as a result of you signing this authorization could be re-disclosed by the recipient. If this occurs, your re-disclosed health information may no longer be protected by State or Federal laws.  * You may refuse to sign this Authorization. Your refusal will not affect your ability to obtain treatment.  * This Authorization becomes effective upon signing and will  on (date) __________.      If no date is indicated, this Authorization will  one  (1) year from the signature date.    Name: Morris Deng    Signature:   Date:     2/2/2021       PLEASE FAX REQUESTED RECORDS BACK TO: [phone]

## 2021-02-19 ENCOUNTER — IMMUNIZATION (OUTPATIENT)
Dept: FAMILY PLANNING/WOMEN'S HEALTH CLINIC | Facility: IMMUNIZATION CENTER | Age: 81
End: 2021-02-19
Attending: INTERNAL MEDICINE
Payer: MEDICARE

## 2021-02-19 DIAGNOSIS — Z23 ENCOUNTER FOR VACCINATION: Primary | ICD-10-CM

## 2021-02-19 PROCEDURE — 91300 PFIZER SARS-COV-2 VACCINE: CPT | Performed by: INTERNAL MEDICINE

## 2021-02-19 PROCEDURE — 0002A PFIZER SARS-COV-2 VACCINE: CPT | Performed by: INTERNAL MEDICINE

## 2021-02-24 ENCOUNTER — APPOINTMENT (RX ONLY)
Dept: URBAN - METROPOLITAN AREA CLINIC 20 | Facility: CLINIC | Age: 81
Setting detail: DERMATOLOGY
End: 2021-02-24

## 2021-02-24 DIAGNOSIS — D22 MELANOCYTIC NEVI: ICD-10-CM

## 2021-02-24 DIAGNOSIS — L30.0 NUMMULAR DERMATITIS: ICD-10-CM

## 2021-02-24 DIAGNOSIS — L82.1 OTHER SEBORRHEIC KERATOSIS: ICD-10-CM

## 2021-02-24 DIAGNOSIS — L57.8 OTHER SKIN CHANGES DUE TO CHRONIC EXPOSURE TO NONIONIZING RADIATION: ICD-10-CM

## 2021-02-24 DIAGNOSIS — Z85.820 PERSONAL HISTORY OF MALIGNANT MELANOMA OF SKIN: ICD-10-CM

## 2021-02-24 DIAGNOSIS — L81.4 OTHER MELANIN HYPERPIGMENTATION: ICD-10-CM

## 2021-02-24 DIAGNOSIS — Z85.828 PERSONAL HISTORY OF OTHER MALIGNANT NEOPLASM OF SKIN: ICD-10-CM

## 2021-02-24 DIAGNOSIS — Q82.5 CONGENITAL NON-NEOPLASTIC NEVUS: ICD-10-CM

## 2021-02-24 DIAGNOSIS — L30.4 ERYTHEMA INTERTRIGO: ICD-10-CM | Status: INADEQUATELY CONTROLLED

## 2021-02-24 DIAGNOSIS — D18.0 HEMANGIOMA: ICD-10-CM

## 2021-02-24 PROBLEM — D22.5 MELANOCYTIC NEVI OF TRUNK: Status: ACTIVE | Noted: 2021-02-24

## 2021-02-24 PROBLEM — D18.01 HEMANGIOMA OF SKIN AND SUBCUTANEOUS TISSUE: Status: ACTIVE | Noted: 2021-02-24

## 2021-02-24 PROCEDURE — ? COUNSELING

## 2021-02-24 PROCEDURE — 99203 OFFICE O/P NEW LOW 30 MIN: CPT

## 2021-02-24 PROCEDURE — ? ADDITIONAL NOTES

## 2021-02-24 PROCEDURE — ? PRESCRIPTION

## 2021-02-24 PROCEDURE — ? DIAGNOSIS COMMENT

## 2021-02-24 RX ORDER — KETOCONAZOLE 20 MG/G
CREAM TOPICAL
Qty: 1 | Refills: 6 | Status: ERX | COMMUNITY
Start: 2021-02-24

## 2021-02-24 RX ORDER — TRIAMCINOLONE ACETONIDE 1 MG/G
CREAM TOPICAL BID
Qty: 1 | Refills: 3 | Status: ERX | COMMUNITY
Start: 2021-02-24

## 2021-02-24 RX ADMIN — KETOCONAZOLE THIN LAYER: 20 CREAM TOPICAL at 00:00

## 2021-02-24 RX ADMIN — TRIAMCINOLONE ACETONIDE THIN LAYER: 1 CREAM TOPICAL at 00:00

## 2021-02-24 ASSESSMENT — LOCATION ZONE DERM
LOCATION ZONE: TRUNK
LOCATION ZONE: LEG
LOCATION ZONE: FACE
LOCATION ZONE: ARM
LOCATION ZONE: NOSE

## 2021-02-24 ASSESSMENT — LOCATION DETAILED DESCRIPTION DERM
LOCATION DETAILED: LEFT INGUINAL CREASE
LOCATION DETAILED: NASAL TIP
LOCATION DETAILED: LEFT FOREHEAD
LOCATION DETAILED: RIGHT CENTRAL MALAR CHEEK
LOCATION DETAILED: LEFT PROXIMAL PRETIBIAL REGION
LOCATION DETAILED: RIGHT ANTERIOR PROXIMAL UPPER ARM
LOCATION DETAILED: LEFT ANTERIOR PROXIMAL UPPER ARM
LOCATION DETAILED: LEFT INFERIOR CENTRAL MALAR CHEEK
LOCATION DETAILED: RIGHT ANTERIOR DISTAL THIGH
LOCATION DETAILED: RIGHT SUPERIOR MEDIAL UPPER BACK
LOCATION DETAILED: LEFT ANTERIOR DISTAL THIGH
LOCATION DETAILED: LEFT DISTAL DORSAL FOREARM
LOCATION DETAILED: RIGHT INGUINAL CREASE
LOCATION DETAILED: RIGHT DISTAL DORSAL FOREARM
LOCATION DETAILED: RIGHT MID-UPPER BACK
LOCATION DETAILED: INFERIOR THORACIC SPINE
LOCATION DETAILED: LEFT MEDIAL SUPERIOR CHEST
LOCATION DETAILED: RIGHT INFERIOR UPPER BACK

## 2021-02-24 ASSESSMENT — LOCATION SIMPLE DESCRIPTION DERM
LOCATION SIMPLE: LEFT THIGH
LOCATION SIMPLE: RIGHT CHEEK
LOCATION SIMPLE: RIGHT THIGH
LOCATION SIMPLE: RIGHT UPPER BACK
LOCATION SIMPLE: RIGHT FOREARM
LOCATION SIMPLE: LEFT CHEEK
LOCATION SIMPLE: LEFT UPPER ARM
LOCATION SIMPLE: CHEST
LOCATION SIMPLE: LEFT FOREARM
LOCATION SIMPLE: NOSE
LOCATION SIMPLE: LEFT FOREHEAD
LOCATION SIMPLE: GROIN
LOCATION SIMPLE: LEFT PRETIBIAL REGION
LOCATION SIMPLE: RIGHT UPPER ARM
LOCATION SIMPLE: UPPER BACK

## 2021-02-24 NOTE — PROCEDURE: ADDITIONAL NOTES
Detail Level: Simple
Additional Notes: Handout reviewed,
Render Risk Assessment In Note?: no
Additional Notes: Will request medical records from Dr. Lazcano

## 2021-02-24 NOTE — PROCEDURE: MIPS QUALITY
Detail Level: Detailed
Quality 130: Documentation Of Current Medications In The Medical Record: Current Medications Documented
Quality 111:Pneumonia Vaccination Status For Older Adults: Pneumococcal Vaccination Previously Received
Quality 226: Preventive Care And Screening: Tobacco Use: Screening And Cessation Intervention: Patient screened for tobacco use and is an ex/non-smoker
Quality 431: Preventive Care And Screening: Unhealthy Alcohol Use - Screening: Patient screened for unhealthy alcohol use using a single question and scores less than 2 times per year
Quality 402: Tobacco Use And Help With Quitting Among Adolescents: Patient screened for tobacco and never smoked

## 2021-04-13 ENCOUNTER — PATIENT OUTREACH (OUTPATIENT)
Dept: HEALTH INFORMATION MANAGEMENT | Facility: OTHER | Age: 81
End: 2021-04-13

## 2021-04-13 NOTE — PROGRESS NOTES
Outcome: Scheduled    Please transfer to Patient Outreach Team at [phone] when patient returns call.        Attempt # 2

## 2021-04-15 DIAGNOSIS — E78.49 OTHER HYPERLIPIDEMIA: ICD-10-CM

## 2021-04-15 RX ORDER — CHLORTHALIDONE 50 MG/1
TABLET ORAL
Qty: 100 TABLET | Refills: 0 | Status: SHIPPED | OUTPATIENT
Start: 2021-04-15 | End: 2021-07-19

## 2021-04-27 ENCOUNTER — APPOINTMENT (RX ONLY)
Dept: URBAN - METROPOLITAN AREA CLINIC 20 | Facility: CLINIC | Age: 81
Setting detail: DERMATOLOGY
End: 2021-04-27

## 2021-04-27 DIAGNOSIS — L30.4 ERYTHEMA INTERTRIGO: ICD-10-CM | Status: IMPROVED

## 2021-04-27 DIAGNOSIS — L30.0 NUMMULAR DERMATITIS: ICD-10-CM | Status: IMPROVED

## 2021-04-27 PROCEDURE — ? ADDITIONAL NOTES

## 2021-04-27 PROCEDURE — ? PRESCRIPTION

## 2021-04-27 PROCEDURE — 99214 OFFICE O/P EST MOD 30 MIN: CPT

## 2021-04-27 PROCEDURE — ? COUNSELING

## 2021-04-27 RX ORDER — KETOCONAZOLE 20 MG/G
CREAM TOPICAL
Qty: 1 | Refills: 6 | Status: ERX

## 2021-04-27 ASSESSMENT — LOCATION DETAILED DESCRIPTION DERM
LOCATION DETAILED: RIGHT INGUINAL CREASE
LOCATION DETAILED: LEFT PROXIMAL PRETIBIAL REGION
LOCATION DETAILED: LEFT INGUINAL CREASE

## 2021-04-27 ASSESSMENT — LOCATION ZONE DERM
LOCATION ZONE: LEG
LOCATION ZONE: TRUNK

## 2021-04-27 ASSESSMENT — LOCATION SIMPLE DESCRIPTION DERM
LOCATION SIMPLE: LEFT PRETIBIAL REGION
LOCATION SIMPLE: GROIN

## 2021-04-27 NOTE — PROCEDURE: MIPS QUALITY
Quality 431: Preventive Care And Screening: Unhealthy Alcohol Use - Screening: Patient screened for unhealthy alcohol use using a single question and scores less than 2 times per year
Detail Level: Detailed
Quality 402: Tobacco Use And Help With Quitting Among Adolescents: Patient screened for tobacco and never smoked
Quality 130: Documentation Of Current Medications In The Medical Record: Current Medications Documented
Quality 111:Pneumonia Vaccination Status For Older Adults: Pneumococcal Vaccination Previously Received
Quality 226: Preventive Care And Screening: Tobacco Use: Screening And Cessation Intervention: Patient screened for tobacco use and is an ex/non-smoker

## 2021-04-27 NOTE — PROCEDURE: ADDITIONAL NOTES
Detail Level: Simple
Additional Notes: Plan\\n\\n1. Daily use Zeabsorb powder\\n2. With flare restart Ketoconazole
Render Risk Assessment In Note?: no

## 2021-05-17 ENCOUNTER — HOSPITAL ENCOUNTER (OUTPATIENT)
Dept: LAB | Facility: MEDICAL CENTER | Age: 81
End: 2021-05-17
Attending: INTERNAL MEDICINE
Payer: MEDICARE

## 2021-05-17 DIAGNOSIS — E78.49 OTHER HYPERLIPIDEMIA: ICD-10-CM

## 2021-05-17 DIAGNOSIS — E11.00 TYPE 2 DIABETES MELLITUS WITH HYPEROSMOLARITY WITHOUT COMA, WITHOUT LONG-TERM CURRENT USE OF INSULIN (HCC): ICD-10-CM

## 2021-05-17 DIAGNOSIS — R53.83 OTHER FATIGUE: ICD-10-CM

## 2021-05-17 DIAGNOSIS — Z12.5 SCREENING FOR PROSTATE CANCER: ICD-10-CM

## 2021-05-17 LAB
ALBUMIN SERPL BCP-MCNC: 4.2 G/DL (ref 3.2–4.9)
ALBUMIN/GLOB SERPL: 1.4 G/DL
ALP SERPL-CCNC: 78 U/L (ref 30–99)
ALT SERPL-CCNC: 26 U/L (ref 2–50)
ANION GAP SERPL CALC-SCNC: 11 MMOL/L (ref 7–16)
AST SERPL-CCNC: 25 U/L (ref 12–45)
BASOPHILS # BLD AUTO: 0.2 % (ref 0–1.8)
BASOPHILS # BLD: 0.01 K/UL (ref 0–0.12)
BILIRUB SERPL-MCNC: 1.2 MG/DL (ref 0.1–1.5)
BUN SERPL-MCNC: 20 MG/DL (ref 8–22)
CALCIUM SERPL-MCNC: 10.2 MG/DL (ref 8.5–10.5)
CHLORIDE SERPL-SCNC: 103 MMOL/L (ref 96–112)
CHOLEST SERPL-MCNC: 122 MG/DL (ref 100–199)
CO2 SERPL-SCNC: 26 MMOL/L (ref 20–33)
CREAT SERPL-MCNC: 1.26 MG/DL (ref 0.5–1.4)
EOSINOPHIL # BLD AUTO: 0.08 K/UL (ref 0–0.51)
EOSINOPHIL NFR BLD: 1.4 % (ref 0–6.9)
ERYTHROCYTE [DISTWIDTH] IN BLOOD BY AUTOMATED COUNT: 46.1 FL (ref 35.9–50)
EST. AVERAGE GLUCOSE BLD GHB EST-MCNC: 146 MG/DL
FASTING STATUS PATIENT QL REPORTED: NORMAL
FOLATE SERPL-MCNC: 10.8 NG/ML
GLOBULIN SER CALC-MCNC: 2.9 G/DL (ref 1.9–3.5)
GLUCOSE SERPL-MCNC: 110 MG/DL (ref 65–99)
HBA1C MFR BLD: 6.7 % (ref 4–5.6)
HCT VFR BLD AUTO: 46.6 % (ref 42–52)
HDLC SERPL-MCNC: 46 MG/DL
HGB BLD-MCNC: 15.7 G/DL (ref 14–18)
IMM GRANULOCYTES # BLD AUTO: 0.01 K/UL (ref 0–0.11)
IMM GRANULOCYTES NFR BLD AUTO: 0.2 % (ref 0–0.9)
LDLC SERPL CALC-MCNC: 57 MG/DL
LYMPHOCYTES # BLD AUTO: 1.45 K/UL (ref 1–4.8)
LYMPHOCYTES NFR BLD: 25.8 % (ref 22–41)
MCH RBC QN AUTO: 32.6 PG (ref 27–33)
MCHC RBC AUTO-ENTMCNC: 33.7 G/DL (ref 33.7–35.3)
MCV RBC AUTO: 96.9 FL (ref 81.4–97.8)
MONOCYTES # BLD AUTO: 0.61 K/UL (ref 0–0.85)
MONOCYTES NFR BLD AUTO: 10.9 % (ref 0–13.4)
NEUTROPHILS # BLD AUTO: 3.46 K/UL (ref 1.82–7.42)
NEUTROPHILS NFR BLD: 61.5 % (ref 44–72)
NRBC # BLD AUTO: 0 K/UL
NRBC BLD-RTO: 0 /100 WBC
PLATELET # BLD AUTO: 177 K/UL (ref 164–446)
PMV BLD AUTO: 10.1 FL (ref 9–12.9)
POTASSIUM SERPL-SCNC: 4.2 MMOL/L (ref 3.6–5.5)
PROT SERPL-MCNC: 7.1 G/DL (ref 6–8.2)
PSA SERPL-MCNC: 1.56 NG/ML (ref 0–4)
RBC # BLD AUTO: 4.81 M/UL (ref 4.7–6.1)
SODIUM SERPL-SCNC: 140 MMOL/L (ref 135–145)
TRIGL SERPL-MCNC: 94 MG/DL (ref 0–149)
TSH SERPL DL<=0.005 MIU/L-ACNC: 1.99 UIU/ML (ref 0.38–5.33)
VIT B12 SERPL-MCNC: 314 PG/ML (ref 211–911)
WBC # BLD AUTO: 5.6 K/UL (ref 4.8–10.8)

## 2021-05-17 PROCEDURE — 36415 COLL VENOUS BLD VENIPUNCTURE: CPT

## 2021-05-17 PROCEDURE — 84443 ASSAY THYROID STIM HORMONE: CPT

## 2021-05-17 PROCEDURE — 80061 LIPID PANEL: CPT

## 2021-05-17 PROCEDURE — 83036 HEMOGLOBIN GLYCOSYLATED A1C: CPT

## 2021-05-17 PROCEDURE — 82746 ASSAY OF FOLIC ACID SERUM: CPT

## 2021-05-17 PROCEDURE — 85025 COMPLETE CBC W/AUTO DIFF WBC: CPT

## 2021-05-17 PROCEDURE — 84153 ASSAY OF PSA TOTAL: CPT

## 2021-05-17 PROCEDURE — 82607 VITAMIN B-12: CPT

## 2021-05-17 PROCEDURE — 80053 COMPREHEN METABOLIC PANEL: CPT

## 2021-05-31 DIAGNOSIS — I10 ESSENTIAL HYPERTENSION: ICD-10-CM

## 2021-06-01 ENCOUNTER — OFFICE VISIT (OUTPATIENT)
Dept: CARDIOLOGY | Facility: MEDICAL CENTER | Age: 81
End: 2021-06-01
Payer: MEDICARE

## 2021-06-01 VITALS
OXYGEN SATURATION: 98 % | SYSTOLIC BLOOD PRESSURE: 146 MMHG | RESPIRATION RATE: 12 BRPM | BODY MASS INDEX: 26.68 KG/M2 | HEIGHT: 67 IN | HEART RATE: 68 BPM | DIASTOLIC BLOOD PRESSURE: 82 MMHG | WEIGHT: 170 LBS

## 2021-06-01 DIAGNOSIS — R93.1 AGATSTON CORONARY ARTERY CALCIUM SCORE GREATER THAN 400: ICD-10-CM

## 2021-06-01 DIAGNOSIS — I25.10 ASCVD (ARTERIOSCLEROTIC CARDIOVASCULAR DISEASE): ICD-10-CM

## 2021-06-01 DIAGNOSIS — I10 ESSENTIAL HYPERTENSION: ICD-10-CM

## 2021-06-01 DIAGNOSIS — E78.49 OTHER HYPERLIPIDEMIA: ICD-10-CM

## 2021-06-01 PROCEDURE — 99214 OFFICE O/P EST MOD 30 MIN: CPT | Performed by: INTERNAL MEDICINE

## 2021-06-01 RX ORDER — CARVEDILOL 12.5 MG/1
TABLET ORAL
Qty: 180 TABLET | Refills: 0 | Status: SHIPPED
Start: 2021-06-01 | End: 2021-06-01

## 2021-06-01 RX ORDER — CARVEDILOL 25 MG/1
25 TABLET ORAL 2 TIMES DAILY WITH MEALS
Qty: 180 TABLET | Refills: 3 | Status: SHIPPED | OUTPATIENT
Start: 2021-06-01 | End: 2022-07-07

## 2021-06-01 ASSESSMENT — ENCOUNTER SYMPTOMS
ALTERED MENTAL STATUS: 0
BLURRED VISION: 0
IRREGULAR HEARTBEAT: 0
SYNCOPE: 0
PND: 0
FLANK PAIN: 0
PALPITATIONS: 0
FEVER: 0
DYSPNEA ON EXERTION: 0
WEIGHT LOSS: 0
CONSTIPATION: 0
SHORTNESS OF BREATH: 0
DECREASED APPETITE: 0
DIARRHEA: 0
VOMITING: 0
NEAR-SYNCOPE: 0
ORTHOPNEA: 0
WEIGHT GAIN: 0
DEPRESSION: 0
NAUSEA: 0
DIZZINESS: 0
ABDOMINAL PAIN: 0
BACK PAIN: 0
CLAUDICATION: 0
COUGH: 0
HEARTBURN: 0

## 2021-06-01 ASSESSMENT — FIBROSIS 4 INDEX: FIB4 SCORE: 2.19

## 2021-06-01 NOTE — PATIENT INSTRUCTIONS
"  If sustains above 130/80, call cardiology to adjust blood pressure medicines.     Hypertension, Adult  High blood pressure (hypertension) is when the force of blood pumping through the arteries is too strong. The arteries are the blood vessels that carry blood from the heart throughout the body. Hypertension forces the heart to work harder to pump blood and may cause arteries to become narrow or stiff. Untreated or uncontrolled hypertension can cause a heart attack, heart failure, a stroke, kidney disease, and other problems.  A blood pressure reading consists of a higher number over a lower number. Ideally, your blood pressure should be below 120/80. The first (\"top\") number is called the systolic pressure. It is a measure of the pressure in your arteries as your heart beats. The second (\"bottom\") number is called the diastolic pressure. It is a measure of the pressure in your arteries as the heart relaxes.  What are the causes?  The exact cause of this condition is not known. There are some conditions that result in or are related to high blood pressure.  What increases the risk?  Some risk factors for high blood pressure are under your control. The following factors may make you more likely to develop this condition:  · Smoking.  · Having type 2 diabetes mellitus, high cholesterol, or both.  · Not getting enough exercise or physical activity.  · Being overweight.  · Having too much fat, sugar, calories, or salt (sodium) in your diet.  · Drinking too much alcohol.  Some risk factors for high blood pressure may be difficult or impossible to change. Some of these factors include:  · Having chronic kidney disease.  · Having a family history of high blood pressure.  · Age. Risk increases with age.  · Race. You may be at higher risk if you are .  · Gender. Men are at higher risk than women before age 45. After age 65, women are at higher risk than men.  · Having obstructive sleep " apnea.  · Stress.  What are the signs or symptoms?  High blood pressure may not cause symptoms. Very high blood pressure (hypertensive crisis) may cause:  · Headache.  · Anxiety.  · Shortness of breath.  · Nosebleed.  · Nausea and vomiting.  · Vision changes.  · Severe chest pain.  · Seizures.  How is this diagnosed?  This condition is diagnosed by measuring your blood pressure while you are seated, with your arm resting on a flat surface, your legs uncrossed, and your feet flat on the floor. The cuff of the blood pressure monitor will be placed directly against the skin of your upper arm at the level of your heart. It should be measured at least twice using the same arm. Certain conditions can cause a difference in blood pressure between your right and left arms.  Certain factors can cause blood pressure readings to be lower or higher than normal for a short period of time:  · When your blood pressure is higher when you are in a health care provider's office than when you are at home, this is called white coat hypertension. Most people with this condition do not need medicines.  · When your blood pressure is higher at home than when you are in a health care provider's office, this is called masked hypertension. Most people with this condition may need medicines to control blood pressure.  If you have a high blood pressure reading during one visit or you have normal blood pressure with other risk factors, you may be asked to:  · Return on a different day to have your blood pressure checked again.  · Monitor your blood pressure at home for 1 week or longer.  If you are diagnosed with hypertension, you may have other blood or imaging tests to help your health care provider understand your overall risk for other conditions.  How is this treated?  This condition is treated by making healthy lifestyle changes, such as eating healthy foods, exercising more, and reducing your alcohol intake. Your health care provider may  prescribe medicine if lifestyle changes are not enough to get your blood pressure under control, and if:  · Your systolic blood pressure is above 130.  · Your diastolic blood pressure is above 80.  Your personal target blood pressure may vary depending on your medical conditions, your age, and other factors.  Follow these instructions at home:  Eating and drinking    · Eat a diet that is high in fiber and potassium, and low in sodium, added sugar, and fat. An example eating plan is called the DASH (Dietary Approaches to Stop Hypertension) diet. To eat this way:  ? Eat plenty of fresh fruits and vegetables. Try to fill one half of your plate at each meal with fruits and vegetables.  ? Eat whole grains, such as whole-wheat pasta, brown rice, or whole-grain bread. Fill about one fourth of your plate with whole grains.  ? Eat or drink low-fat dairy products, such as skim milk or low-fat yogurt.  ? Avoid fatty cuts of meat, processed or cured meats, and poultry with skin. Fill about one fourth of your plate with lean proteins, such as fish, chicken without skin, beans, eggs, or tofu.  ? Avoid pre-made and processed foods. These tend to be higher in sodium, added sugar, and fat.  · Reduce your daily sodium intake. Most people with hypertension should eat less than 1,500 mg of sodium a day.  · Do not drink alcohol if:  ? Your health care provider tells you not to drink.  ? You are pregnant, may be pregnant, or are planning to become pregnant.  · If you drink alcohol:  ? Limit how much you use to:  § 0-1 drink a day for women.  § 0-2 drinks a day for men.  ? Be aware of how much alcohol is in your drink. In the [place] one drink equals one 12 oz bottle of beer (355 mL), one 5 oz glass of wine (148 mL), or one 1½ oz glass of hard liquor (44 mL).  Lifestyle    · Work with your health care provider to maintain a healthy body weight or to lose weight. Ask what an ideal weight is for you.  · Get at least 30 minutes of exercise  most days of the week. Activities may include walking, swimming, or biking.  · Include exercise to strengthen your muscles (resistance exercise), such as Pilates or lifting weights, as part of your weekly exercise routine. Try to do these types of exercises for 30 minutes at least 3 days a week.  · Do not use any products that contain nicotine or tobacco, such as cigarettes, e-cigarettes, and chewing tobacco. If you need help quitting, ask your health care provider.  · Monitor your blood pressure at home as told by your health care provider.  · Keep all follow-up visits as told by your health care provider. This is important.  Medicines  · Take over-the-counter and prescription medicines only as told by your health care provider. Follow directions carefully. Blood pressure medicines must be taken as prescribed.  · Do not skip doses of blood pressure medicine. Doing this puts you at risk for problems and can make the medicine less effective.  · Ask your health care provider about side effects or reactions to medicines that you should watch for.  Contact a health care provider if you:  · Think you are having a reaction to a medicine you are taking.  · Have headaches that keep coming back (recurring).  · Feel dizzy.  · Have swelling in your ankles.  · Have trouble with your vision.  Get help right away if you:  · Develop a severe headache or confusion.  · Have unusual weakness or numbness.  · Feel faint.  · Have severe pain in your chest or abdomen.  · Vomit repeatedly.  · Have trouble breathing.  Summary  · Hypertension is when the force of blood pumping through your arteries is too strong. If this condition is not controlled, it may put you at risk for serious complications.  · Your personal target blood pressure may vary depending on your medical conditions, your age, and other factors. For most people, a normal blood pressure is less than 120/80.  · Hypertension is treated with lifestyle changes, medicines, or a  combination of both. Lifestyle changes include losing weight, eating a healthy, low-sodium diet, exercising more, and limiting alcohol.  This information is not intended to replace advice given to you by your health care provider. Make sure you discuss any questions you have with your health care provider.  Document Released: 12/18/2006 Document Revised: 08/28/2019 Document Reviewed: 08/28/2019  Elsevier Patient Education © 2020 [employer]

## 2021-06-01 NOTE — PROGRESS NOTES
Cardiology Note    Chief Complaint   Patient presents with   • Hyperlipidemia   • Hypertension     F/V Dx: Essential hypertension       History of Present Illness: Morris Deng is a 81 y.o. male PMH CAC on CT, HTN, HLD, DM2, small infrarenal aortic aneurysm who presents for follow up.    Since last visit, blood pressure improved however still finds typically SBP 140s. Is nearly out of carvedilol but has been compliant to date. He walks with walker 15 min per day 5 days weekly. No cardiac symptoms when doing so. Compliant with medications, but does indulge in ice cream. 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 He enjoys a martini to relax.     Review of Systems   Constitutional: Negative for decreased appetite, fever, malaise/fatigue, weight gain and weight loss.   HENT: Negative for congestion and nosebleeds.    Eyes: Negative for blurred vision.   Cardiovascular: Negative for chest pain, claudication, dyspnea on exertion, irregular heartbeat, leg swelling, near-syncope, orthopnea, palpitations, paroxysmal nocturnal dyspnea and syncope.   Respiratory: Negative for cough and shortness of breath.    Endocrine: Negative for cold intolerance and heat intolerance.   Skin: Negative for rash.   Musculoskeletal: Negative for back pain.   Gastrointestinal: Negative for abdominal pain, constipation, diarrhea, heartburn, melena, nausea and vomiting.   Genitourinary: Negative for dysuria, flank pain and hematuria.   Neurological: Negative for dizziness.   Psychiatric/Behavioral: Negative for altered mental status and depression.         Past Medical History:   Diagnosis Date   • Cancer (HCC)     colon   • H/O colon cancer, stage I 2/2/2021   • Hypertension    • Neuropathy 2/2/2021   • Other hemorrhoids 2/2/2021         Past Surgical History:   Procedure Laterality Date   • OTHER ABDOMINAL SURGERY      colon resection         Current Outpatient Medications   Medication Sig Dispense Refill   •  carvedilol (COREG) 25 MG Tab Take 1 tablet by mouth 2 times a day with meals. 180 tablet 3   • metFORMIN ER (GLUCOPHAGE XR) 500 MG TABLET SR 24 HR metformin  mg tablet,extended release 24 hr 100 tablet 1   • atorvastatin (LIPITOR) 80 MG tablet Take 1 Tab by mouth every day. 100 Tab 1   • ezetimibe (ZETIA) 10 MG Tab Take 1 Tab by mouth every day. 100 Tab 1   • omeprazole (PRILOSEC) 20 MG delayed-release capsule Take 1 Cap by mouth every day. 100 Cap 1   • betamethasone dipropionate (DIPROLENE) 0.05 % Ointment Apply to affected area twice daily as needed 50 g 2   • clopidogrel (PLAVIX) 75 MG Tab Take 1 Tab by mouth every day. 90 Tab 1   • diphenhydrAMINE (BENADRYL) 25 MG Tab Take 1 Each by mouth every 8 hours. 10 Tab 0   • EPINEPHrine (EPIPEN) 0.3 MG/0.3ML SOAJ solution for injection 1 Syringe by Injection route Once PRN (for severe allergic reaction) for up to 1 dose. 1 Each 1   • EPINEPHrine 0.3 MG/0.3ML ANNY 2 Applicators by Injection route as needed. 1 Device 1   • chlorthalidone (HYGROTON) 50 MG Tab TAKE ONE TABLET BY MOUTH ONE TIME DAILY  100 tablet 0     No current facility-administered medications for this visit.         Allergies   Allergen Reactions   • Peanut-Derived Anaphylaxis   • Food      Cereals: Barley, Buckwheat , corn, oat, wheat   Meats: turkey  Fruits: apple, cantaloupe/ muskmelon, grape white , lemon, orange, peach, pear, watermelon.  Vegetables: Bean, green; bean, lima; cabbage, carrot,celery, lettuce, pea, pepper, green; soybean  Seafood: lobster , Oyster, shrimp  Nuts/ seeds: [place], Cashew, coconut, [place]/Hazelnut,Peanut, Pecan, Pistachio nut,Sesame seed/oil, walnut, flaxseed  Spices/other: Chocolate/ cacao bean, cumin, dill, garlic, mustard,pepper,Histamine control     • Iodine          History reviewed. No pertinent family history.      Social History     Socioeconomic History   • Marital status:      Spouse name: Not on file   • Number of children: Not on file   • Years of  "education: Not on file   • Highest education level: Not on file   Occupational History   • Not on file   Tobacco Use   • Smoking status: Former Smoker   • Smokeless tobacco: Never Used   • Tobacco comment: quit 23 yrs ago   Vaping Use   • Vaping Use: Never used   Substance and Sexual Activity   • Alcohol use: Yes     Comment: daily cocktail (vodka rocks) martini every night   • Drug use: No   • Sexual activity: Not Currently   Other Topics Concern   • Not on file   Social History Narrative   • Not on file     Social Determinants of Health     Financial Resource Strain:    • Difficulty of Paying Living Expenses:    Food Insecurity:    • Worried About Running Out of Food in the Last Year:    • Ran Out of Food in the Last Year:    Transportation Needs:    • Lack of Transportation (Medical):    • Lack of Transportation (Non-Medical):    Physical Activity:    • Days of Exercise per Week:    • Minutes of Exercise per Session:    Stress:    • Feeling of Stress :    Social Connections:    • Frequency of Communication with Friends and Family:    • Frequency of Social Gatherings with Friends and Family:    • Attends [employer] Services:    • Active Member of Clubs or Organizations:    • Attends Club or Organization Meetings:    • Marital Status:    Intimate Partner Violence:    • Fear of Current or Ex-Partner:    • Emotionally Abused:    • Physically Abused:    • Sexually Abused:          Physical Exam:  Ambulatory Vitals  /82 (BP Location: Left arm, Patient Position: Sitting, BP Cuff Size: Adult)   Pulse 68   Resp 12   Ht 1.702 m (5' 7\")   Wt 77.1 kg (170 lb)   SpO2 98%    BP Readings from Last 4 Encounters:   06/01/21 146/82   02/02/21 136/90   04/09/20 (!) 191/108   03/27/20 137/92     Weight/BMI:   Vitals:    06/01/21 1554   BP: 146/82   Weight: 77.1 kg (170 lb)   Height: 1.702 m (5' 7\")    Body mass index is 26.63 kg/m².  Wt Readings from Last 4 Encounters:   06/01/21 77.1 kg (170 lb)   02/02/21 76.6 kg (168 lb " 12.8 oz)   04/09/20 79.6 kg (175 lb 7.8 oz)   03/27/20 79.4 kg (175 lb)       Physical Exam  Constitutional:       General: He is not in acute distress.  HENT:      Head: Normocephalic and atraumatic.   Eyes:      Conjunctiva/sclera: Conjunctivae normal.      Pupils: Pupils are equal, round, and reactive to light.   Neck:      Vascular: No JVD.   Cardiovascular:      Rate and Rhythm: Normal rate and regular rhythm.      Heart sounds: Normal heart sounds. No murmur heard.   No friction rub. No gallop.    Pulmonary:      Effort: Pulmonary effort is normal. No respiratory distress.      Breath sounds: Normal breath sounds. No wheezing or rales.   Chest:      Chest wall: No tenderness.   Abdominal:      General: Bowel sounds are normal. There is no distension.      Palpations: Abdomen is soft.   Musculoskeletal:      Cervical back: Normal range of motion and neck supple.   Skin:     General: Skin is warm and dry.   Neurological:      Mental Status: He is alert and oriented to person, place, and time.   Psychiatric:         Mood and Affect: Affect normal.         Judgment: Judgment normal.           Lab Data Review:  Lab Results   Component Value Date/Time    CHOLSTRLTOT 122 05/17/2021 12:48 PM    LDL 57 05/17/2021 12:48 PM    HDL 46 05/17/2021 12:48 PM    TRIGLYCERIDE 94 05/17/2021 12:48 PM       Lab Results   Component Value Date/Time    SODIUM 140 05/17/2021 12:48 PM    POTASSIUM 4.2 05/17/2021 12:48 PM    CHLORIDE 103 05/17/2021 12:48 PM    CO2 26 05/17/2021 12:48 PM    GLUCOSE 110 (H) 05/17/2021 12:48 PM    BUN 20 05/17/2021 12:48 PM    CREATININE 1.26 05/17/2021 12:48 PM     CrCl cannot be calculated (Patient's most recent lab result is older than the maximum 7 days allowed.).  Lab Results   Component Value Date/Time    ALKPHOSPHAT 78 05/17/2021 12:48 PM    ASTSGOT 25 05/17/2021 12:48 PM    ALTSGPT 26 05/17/2021 12:48 PM    TBILIRUBIN 1.2 05/17/2021 12:48 PM      Lab Results   Component Value Date/Time    WBC 5.6  05/17/2021 12:48 PM     Lab Results   Component Value Date/Time    HBA1C 6.7 (H) 05/17/2021 12:48 PM     No components found for: TROP      Cardiac Imaging and Procedures Review:      EKG 11/2019 sinus, first deg AVB    Calcium score - 1592    Nuclear cindy SPECT 3/20/20  NUCLEAR IMAGING INTERPRETATION   Normal myocardial perfusion with no ischemia.   Normal left ventricular wall motion.  LV ejection fraction = 69%.   ECG INTERPRETATION   Negative stress ECG for ischemia.    TTE 11/2019  CONCLUSIONS  No prior study is available for comparison.   Left ventricular ejection fraction is visually estimated to be greater   than 75%.  Hyperdynamic left ventricular systolic function.  Normal diastolic function.  The right ventricle was normal in size and function.  No significant valve disease or flow abnormalities.   Left Ventricle  Normal left ventricular chamber size. Mild concentric left ventricular   hypertrophy. Hyperdynamic left ventricular systolic function. Left   ventricular ejection fraction is visually estimated to be greater than   75%. Normal regional wall motion. Normal diastolic function.    Abdominal ultrasound 6/2020  Ectasia/small aneurysm of the infrarenal abdominal aorta measuring 2.8 x 2.3 cm    Medical Decision Making:  Problem List Items Addressed This Visit     Hyperlipidemia    Relevant Medications    carvedilol (COREG) 25 MG Tab    Essential hypertension    Relevant Medications    carvedilol (COREG) 25 MG Tab    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    Relevant Medications    carvedilol (COREG) 25 MG Tab    ASCVD (arteriosclerotic cardiovascular disease)    Relevant Medications    carvedilol (COREG) 25 MG Tab        HTN / small infrarenal aortic aneurysm - above goal 120/80. Prior adverse reactions to lisinopril and amlodipine. Continue chlorthalidone. Increase carvedilol.    CAD on CT / ASCVD - continue statin and zetia. Annual lipids. Continue plavix (allergic to aspirin).      DM2 - continue metformin. a1c controlled. Consider SGLT2i in the future.     Attempt increasing exercise to at least 20 min 5 days weekly. Attempt to optimize diet.     It was my pleasure to meet with Mr. Deng.

## 2021-08-02 DIAGNOSIS — E78.5 HYPERLIPIDEMIA, UNSPECIFIED HYPERLIPIDEMIA TYPE: ICD-10-CM

## 2021-08-03 ENCOUNTER — OFFICE VISIT (OUTPATIENT)
Dept: MEDICAL GROUP | Facility: PHYSICIAN GROUP | Age: 81
End: 2021-08-03
Payer: MEDICARE

## 2021-08-03 ENCOUNTER — TELEPHONE (OUTPATIENT)
Dept: MEDICAL GROUP | Facility: PHYSICIAN GROUP | Age: 81
End: 2021-08-03

## 2021-08-03 VITALS
WEIGHT: 175.1 LBS | RESPIRATION RATE: 16 BRPM | HEART RATE: 51 BPM | OXYGEN SATURATION: 97 % | SYSTOLIC BLOOD PRESSURE: 132 MMHG | DIASTOLIC BLOOD PRESSURE: 76 MMHG | BODY MASS INDEX: 28.14 KG/M2 | HEIGHT: 66 IN | TEMPERATURE: 98 F

## 2021-08-03 DIAGNOSIS — E78.49 OTHER HYPERLIPIDEMIA: ICD-10-CM

## 2021-08-03 DIAGNOSIS — I10 ESSENTIAL HYPERTENSION: ICD-10-CM

## 2021-08-03 DIAGNOSIS — Z00.00 ENCOUNTER FOR ANNUAL WELLNESS VISIT (AWV) IN MEDICARE PATIENT: ICD-10-CM

## 2021-08-03 DIAGNOSIS — G62.9 NEUROPATHY: ICD-10-CM

## 2021-08-03 DIAGNOSIS — E55.9 VITAMIN D DEFICIENCY: ICD-10-CM

## 2021-08-03 DIAGNOSIS — E11.59 TYPE 2 DIABETES MELLITUS WITH OTHER CIRCULATORY COMPLICATION, WITHOUT LONG-TERM CURRENT USE OF INSULIN (HCC): ICD-10-CM

## 2021-08-03 DIAGNOSIS — H61.23 BILATERAL IMPACTED CERUMEN: ICD-10-CM

## 2021-08-03 DIAGNOSIS — Z23 NEED FOR VACCINATION: ICD-10-CM

## 2021-08-03 DIAGNOSIS — Z85.038 H/O COLON CANCER, STAGE I: ICD-10-CM

## 2021-08-03 DIAGNOSIS — I25.10 ASCVD (ARTERIOSCLEROTIC CARDIOVASCULAR DISEASE): ICD-10-CM

## 2021-08-03 PROCEDURE — 90750 HZV VACC RECOMBINANT IM: CPT | Performed by: INTERNAL MEDICINE

## 2021-08-03 PROCEDURE — G0439 PPPS, SUBSEQ VISIT: HCPCS | Performed by: INTERNAL MEDICINE

## 2021-08-03 PROCEDURE — 90471 IMMUNIZATION ADMIN: CPT | Performed by: INTERNAL MEDICINE

## 2021-08-03 RX ORDER — CLOPIDOGREL BISULFATE 75 MG/1
TABLET ORAL
Qty: 100 TABLET | Refills: 3 | Status: SHIPPED | OUTPATIENT
Start: 2021-08-03 | End: 2022-10-18 | Stop reason: SDUPTHER

## 2021-08-03 RX ORDER — KETOCONAZOLE 20 MG/G
CREAM TOPICAL DAILY
COMMUNITY

## 2021-08-03 ASSESSMENT — ACTIVITIES OF DAILY LIVING (ADL): BATHING_REQUIRES_ASSISTANCE: 0

## 2021-08-03 ASSESSMENT — FIBROSIS 4 INDEX: FIB4 SCORE: 2.19

## 2021-08-03 ASSESSMENT — PATIENT HEALTH QUESTIONNAIRE - PHQ9: CLINICAL INTERPRETATION OF PHQ2 SCORE: 0

## 2021-08-03 ASSESSMENT — ENCOUNTER SYMPTOMS: GENERAL WELL-BEING: GOOD

## 2021-08-03 NOTE — PROGRESS NOTES
Chief Complaint   Patient presents with   • Annual Wellness Visit       HPI:  Morris Deng is a 81 y.o. here for Medicare Annual Wellness Visit     1. Encounter for annual wellness visit (AWV) in Medicare patient  Due for annual wellness visit    2. ASCVD (arteriosclerotic cardiovascular disease)  Chronic.  Stable.  Patient followed by cardiology, last seen June 2021.  CCS greater than 400.  Carvedilol was increased to 25 mg twice daily.  Patient continues on medication therapy to include Plavix, Coreg, Lipitor, Zetia.    3. Essential hypertension  Blood pressure stable on current medications hypertension, Coreg.    4. Neuropathy  Stable.  Secondary to chemotherapy in the past.  Patient ambulates with the help of a walker.  TSH 1.9  Vitamin B12 within normal limits    5. Other hyperlipidemia  Lipids treated with Zetia and high-dose Lipitor.    6. H/O colon cancer, stage I  Diagnosed with colon cancer in 1998, status post colon resection.  Patient completed 6 months of chemotherapy.  Patient does not know when his follow-up colonoscopy will be.  Patient was seen by [facility].    7. Need for vaccination  Due for Shingrix    8. Type 2 diabetes mellitus with other circulatory complication, without long-term current use of insulin (HCC)  Patient treated with Metformin 500 mg daily.  Hemoglobin A1c 6.7.  Patient has been exercising on a stationary bike.      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     • chlorthalidone (HYGROTON) 50 MG Tab TAKE ONE TABLET BY MOUTH ONE TIME DAILY  100 tablet 2   • carvedilol (COREG) 25 MG Tab Take 1 tablet by mouth 2 times a day with meals. 180 tablet 3   • metFORMIN ER (GLUCOPHAGE XR) 500 MG TABLET SR 24 HR metformin  mg tablet,extended release 24 hr 100 tablet 1   • atorvastatin (LIPITOR) 80 MG tablet Take 1 Tab by mouth every day. 100 Tab 1   • ezetimibe (ZETIA) 10 MG Tab Take 1 Tab by mouth every day. 100 Tab 1   • omeprazole (PRILOSEC) 20 MG delayed-release capsule Take 1 Cap by mouth every day. 100 Cap 1   • betamethasone dipropionate (DIPROLENE) 0.05 % Ointment Apply to affected area twice daily as needed 50 g 2   • diphenhydrAMINE (BENADRYL) 25 MG Tab Take 1 Each by mouth every 8 hours. 10 Tab 0   • EPINEPHrine (EPIPEN) 0.3 MG/0.3ML SOAJ solution for injection 1 Syringe by Injection route Once PRN (for severe allergic reaction) for up to 1 dose. 1 Each 1   • EPINEPHrine 0.3 MG/0.3ML ANNY 2 Applicators by Injection route as needed. 1 Device 1     No current facility-administered medications for this visit.          Current supplements as per medication list.     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 He has never used smokeless tobacco. He reports current alcohol use. He reports that he does not use drugs.  Counseling given: Not Answered  Comment: quit 23 yrs ago      DPA/Advanced Directive:  Patient does not have an Advanced Directive.  A packet and workshop information was given on Advanced Directives.    ROS:    Gait: Uses a walker  Ostomy: No  Other tubes: No  Amputations: No  Chronic oxygen use: No  Last eye exam: 01/2021  Wears hearing aids: No   : Denies any urinary leakage during the last 6 months    Screening:    Depression Screening    Little interest or pleasure in doing things?  0  - not at all  Feeling down, depressed , or hopeless? 0 - not at all  Patient Health Questionnaire Score: 0     If depressive symptoms identified deferred to follow up visit unless specifically addressed in assessment and plan.    Interpretation of PHQ-9 Total Score   Score Severity   1-4 No Depression   5-9 Mild Depression   10-14 Moderate Depression   15-19 Moderately Severe Depression   20-27 Severe Depression    Screening for Cognitive Impairment    Three Minute Recall (captain, garden, picture) 3/3    [place] clock face with all 12 numbers and set the hands to show 5 past 8.  Yes    Cognitive concerns identified deferred for follow up unless specifically addressed in assessment and plan.    Fall Risk Assessment    Has the patient had two or more falls in the last year or any fall with injury in the last year?  No    Safety Assessment    Throw rugs on floor.  Yes  Handrails on all stairs.  Yes  Good lighting in all hallways.  Yes  Difficulty hearing.  No  Patient counseled about all safety risks that were identified.    Functional Assessment ADLs    Are there any barriers preventing you from cooking for yourself or meeting nutritional needs?  No.    Are there any barriers preventing you from driving safely or obtaining transportation?  No.    Are there any barriers preventing you from using a telephone or calling for help?  No.    Are there any barriers preventing you from shopping?  No.    Are there any barriers preventing you from taking care of your own finances?  No.    Are there any barriers preventing you from managing your medications?  No.    Are there any barriers preventing you from showering, bathing or dressing yourself?  No.    Are you currently engaging in any exercise or physical activity?  Yes.  Stationary Bike   What is your perception of your health?  Good.      Health Maintenance Summary                Annual Wellness Visit Overdue 1940     URINE ACR / MICROALBUMIN Overdue 1942      "DIABETES MONOFILAMENT / LE EXAM Overdue 1942     RETINAL SCREENING Overdue 3/31/1960     COLONOSCOPY Overdue 8/14/2020      Done 8/14/2017 REFERRAL TO GI FOR COLONOSCOPY    IMM ZOSTER VACCINES Overdue 5/18/2021      Done 3/23/2021 Imm Admin: Zoster Vaccine Recombinant (RZV) (SHINGRIX)     Patient has more history with this topic...    IMM INFLUENZA Next Due 9/1/2021      Done 9/23/2019 Imm Admin: Influenza Vaccine Adult HD     Patient has more history with this topic...    A1C SCREENING Next Due 11/17/2021      Done 5/17/2021 HEMOGLOBIN A1C    FASTING LIPID PROFILE Next Due 5/17/2022      Done 5/17/2021 LIPID PROFILE     Patient has more history with this topic...    SERUM CREATININE Next Due 5/17/2022      Done 5/17/2021 COMP METABOLIC PANEL     Patient has more history with this topic...    IMM DTaP/Tdap/Td Vaccine Next Due 4/17/2028      Done 4/17/2018 Imm Admin: Tdap Vaccine     Patient has more history with this topic...          Patient Care Team:  Debbie Clancy M.D. as PCP - General (Internal Medicine)        Social History     Tobacco Use   • Smoking status: Former Smoker   • Smokeless tobacco: Never Used   • Tobacco comment: quit 23 yrs ago   Vaping Use   • Vaping Use: Never used   Substance Use Topics   • Alcohol use: Yes     Comment: daily cocktail (vodka rocks) martini every night   • Drug use: No     No family history on file.  He  has a past medical history of Cancer (HCC), H/O colon cancer, stage I (2/2/2021), Hypertension, Neuropathy (2/2/2021), and Other hemorrhoids (2/2/2021).   Past Surgical History:   Procedure Laterality Date   • OTHER ABDOMINAL SURGERY      colon resection       Exam:   /76 (BP Location: Right arm, Patient Position: Sitting, BP Cuff Size: Adult)   Pulse (!) 51   Temp 36.7 °C (98 °F) (Temporal)   Resp 16   Ht 1.676 m (5' 6\")   Wt 79.4 kg (175 lb 1.6 oz)   SpO2 97%  Body mass index is 28.26 kg/m².    Hearing good.    Dentition fair  Alert, oriented in no acute " distress.  Eye contact is good, speech goal directed, affect calm    Constitutional: Alert, no distress.  Skin: Warm, dry, good turgor, no rashes in visible areas.  Eye: Equal, round and reactive, conjunctiva clear, lids normal.  ENMT: Lips without lesions, good dentition, oropharynx clear.  Bilateral impacted cerumen.  Neck: Trachea midline, no masses, no thyromegaly. No cervical or supraclavicular lymphadenopathy  Respiratory: Unlabored respiratory effort, lungs clear to auscultation, no wheezes, no ronchi.  Cardiovascular: Normal S1, S2, no murmur, no edema.  Abdomen: Soft, non-tender, no masses, no hepatosplenomegaly.  Psych: Alert and oriented x3, normal affect and mood.    Assessment and Plan. The following treatment and monitoring plan is recommended:    There are no diagnoses linked to this encounter.    1. Encounter for annual wellness visit (AWV) in Medicare patient    - Subsequent Annual Wellness Visit - Includes PPPS ()    2. ASCVD (arteriosclerotic cardiovascular disease)  Continue current plan of care.  Follow-up with cardiology as scheduled.  - Subsequent Annual Wellness Visit - Includes PPPS ()    3. Essential hypertension  Stable.  Continue current plan of care  - Subsequent Annual Wellness Visit - Includes PPPS ()    4. Neuropathy  Stable.  Continue to monitor  - Subsequent Annual Wellness Visit - Includes PPPS ()    5. Other hyperlipidemia  Lipid panel up-to-date.  Continue current treatment  - Subsequent Annual Wellness Visit - Includes PPPS ()    6. H/O colon cancer, stage I  [facility]btain medical records [facility]  - Subsequent Annual Wellness Visit - Includes PPPS ()    7. Need for vaccination    - Subsequent Annual Wellness Visit - Includes PPPS ()  - Shingles Vaccine (Shingrix)    8. Type 2 diabetes mellitus with other circulatory complication, without long-term current use of insulin (HCC)    - Subsequent Annual Wellness Visit - Includes PPPS ()  -  MICROALB/CREAT RATIO, TIMED UR    9. Vitamin D deficiency    - Subsequent Annual Wellness Visit - Includes PPPS ()  - VITAMIN D,25 HYDROXY; Future    10. Bilateral impacted cerumen  Ear lavage per MA      Due to excessive cerumen impaction, patient is scheduled for an MA visit on August 9 for repeat lavage.  Patient was instructed to use Debrox prior to visit.    Services suggested: No services needed at this time  Health Care Screening: Age-appropriate preventive services recommended by USPTF and ACIP covered by Medicare were discussed today. Services ordered if indicated and agreed upon by the patient.  Referrals offered: Community-based lifestyle interventions to reduce health risks and promote self-management and wellness, fall prevention, nutrition, physical activity, tobacco-use cessation, weight loss, and mental health services as per orders if indicated.    Discussion today about general wellness and lifestyle habits:    · Prevent falls and reduce trip hazards; Cautioned about securing or removing rugs.  · Have a working fire alarm and carbon monoxide detector;   · Engage in regular physical activity and social activities     Follow-up: No follow-ups on file.

## 2021-08-09 ENCOUNTER — NON-PROVIDER VISIT (OUTPATIENT)
Dept: MEDICAL GROUP | Facility: PHYSICIAN GROUP | Age: 81
End: 2021-08-09
Payer: MEDICARE

## 2021-08-09 PROCEDURE — 99999 PR NO CHARGE: CPT | Performed by: INTERNAL MEDICINE

## 2021-08-09 NOTE — NON-PROVIDER
Ji Deng is a 81 y.o. male here for a non-provider visit for bilateral ear lavage     If abnormal was an in office provider notified today (if so, indicate provider)? No    Routed to PCP? No    Both ears had significant amounts of old ear wax.  Patient reported using Debrox in each ear for last 4 days 2 times a day.  The wax was obscuring the site of the ear drum in both ears.    Patient tolerated procedure very well.  A fair amount of dark yellow to black wax was successfully removed.  A visual confirmed the site of both ear drums on inspection. Cotton was placed in both ears to help absorb any excess water/peroxide mixture. Patient reported he could already hear better even with the cotton in his ears.  I advised a MA visit every 2-3 months for bilateral ear lavage.

## 2021-08-17 RX ORDER — EZETIMIBE 10 MG/1
10 TABLET ORAL DAILY
Qty: 100 TABLET | Refills: 3 | Status: SHIPPED | OUTPATIENT
Start: 2021-08-17 | End: 2022-08-31 | Stop reason: SDUPTHER

## 2021-09-10 RX ORDER — ATORVASTATIN CALCIUM 80 MG/1
TABLET, FILM COATED ORAL
Qty: 100 TABLET | Refills: 1 | Status: SHIPPED | OUTPATIENT
Start: 2021-09-10 | End: 2022-04-11

## 2021-09-13 RX ORDER — OMEPRAZOLE 20 MG/1
20 CAPSULE, DELAYED RELEASE ORAL DAILY
Qty: 100 CAPSULE | Refills: 0 | Status: SHIPPED | OUTPATIENT
Start: 2021-09-13 | End: 2021-12-16 | Stop reason: SDUPTHER

## 2021-09-13 NOTE — TELEPHONE ENCOUNTER
Requested Prescriptions     Pending Prescriptions Disp Refills   • omeprazole (PRILOSEC) 20 MG delayed-release capsule 100 Capsule 0     Sig: Take 1 Capsule by mouth every day.   Jocelyn Navarro M.D.

## 2021-10-11 ENCOUNTER — RX ONLY (OUTPATIENT)
Age: 81
Setting detail: RX ONLY
End: 2021-10-11

## 2021-10-11 RX ORDER — KETOCONAZOLE 20 MG/G
CREAM TOPICAL
Qty: 60 | Refills: 6 | Status: ERX

## 2021-11-16 ENCOUNTER — NON-PROVIDER VISIT (OUTPATIENT)
Dept: MEDICAL GROUP | Facility: PHYSICIAN GROUP | Age: 81
End: 2021-11-16
Payer: MEDICARE

## 2021-11-16 DIAGNOSIS — Z23 NEED FOR VACCINATION: ICD-10-CM

## 2021-11-16 PROCEDURE — G0008 ADMIN INFLUENZA VIRUS VAC: HCPCS | Performed by: INTERNAL MEDICINE

## 2021-11-16 PROCEDURE — 90662 IIV NO PRSV INCREASED AG IM: CPT | Performed by: INTERNAL MEDICINE

## 2021-11-16 NOTE — PROGRESS NOTES
"Ji Deng is a 81 y.o. male here for a non-provider visit for:   FLU    Reason for immunization: Annual Flu Vaccine  Immunization records indicate need for vaccine: Yes, confirmed with Epic  Minimum interval has been met for this vaccine: Yes  ABN completed: Yes    All IAC Questionnaire questions were answered \"No.\"    Patient tolerated injection and no adverse effects were observed or reported: Yes    Pt scheduled for next dose in series: Not Indicated  "

## 2021-12-13 ENCOUNTER — OFFICE VISIT (OUTPATIENT)
Dept: CARDIOLOGY | Facility: MEDICAL CENTER | Age: 81
End: 2021-12-13
Payer: MEDICARE

## 2021-12-13 VITALS
BODY MASS INDEX: 27.8 KG/M2 | HEART RATE: 58 BPM | WEIGHT: 173 LBS | HEIGHT: 66 IN | OXYGEN SATURATION: 96 % | SYSTOLIC BLOOD PRESSURE: 138 MMHG | RESPIRATION RATE: 14 BRPM | DIASTOLIC BLOOD PRESSURE: 78 MMHG

## 2021-12-13 DIAGNOSIS — R93.1 AGATSTON CORONARY ARTERY CALCIUM SCORE GREATER THAN 400: ICD-10-CM

## 2021-12-13 DIAGNOSIS — I10 ESSENTIAL HYPERTENSION: ICD-10-CM

## 2021-12-13 DIAGNOSIS — E78.49 OTHER HYPERLIPIDEMIA: ICD-10-CM

## 2021-12-13 PROBLEM — I25.10 ASCVD (ARTERIOSCLEROTIC CARDIOVASCULAR DISEASE): Status: RESOLVED | Noted: 2020-03-27 | Resolved: 2021-12-13

## 2021-12-13 PROCEDURE — 99214 OFFICE O/P EST MOD 30 MIN: CPT | Performed by: INTERNAL MEDICINE

## 2021-12-13 RX ORDER — CHLORTHALIDONE 25 MG/1
25 TABLET ORAL DAILY
Qty: 90 TABLET | Refills: 3 | Status: SHIPPED | OUTPATIENT
Start: 2021-12-13 | End: 2023-03-16

## 2021-12-13 ASSESSMENT — ENCOUNTER SYMPTOMS
HEARTBURN: 0
DEPRESSION: 0
NEAR-SYNCOPE: 0
DYSPNEA ON EXERTION: 0
ALTERED MENTAL STATUS: 0
WEIGHT GAIN: 0
ABDOMINAL PAIN: 0
VOMITING: 0
COUGH: 0
SHORTNESS OF BREATH: 0
BLURRED VISION: 0
NAUSEA: 0
BACK PAIN: 0
PALPITATIONS: 0
IRREGULAR HEARTBEAT: 0
DIZZINESS: 0
WEIGHT LOSS: 0
ORTHOPNEA: 0
SYNCOPE: 0
CLAUDICATION: 0
CONSTIPATION: 0
PND: 0
FEVER: 0
DECREASED APPETITE: 0
DIARRHEA: 0
FLANK PAIN: 0

## 2021-12-13 ASSESSMENT — FIBROSIS 4 INDEX: FIB4 SCORE: 2.19

## 2021-12-13 NOTE — PROGRESS NOTES
Cardiology Note    Chief Complaint   Patient presents with   • HTN (Controlled)   • Hyperlipidemia       History of Present Illness: Morris Deng is a 81 y.o. male PMH CAC on CT, HTN, HLD, DM2, small infrarenal aortic aneurysm who presents for follow up.    Doing well without cardiac complaints. Last night 110/64; average 120/80s. Compliant with medications. Describes dry mouth later in the day. 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     Review of Systems   Constitutional: Negative for decreased appetite, fever, malaise/fatigue, weight gain and weight loss.   HENT: Negative for congestion and nosebleeds.    Eyes: Negative for blurred vision.   Cardiovascular: Negative for chest pain, claudication, dyspnea on exertion, irregular heartbeat, leg swelling, near-syncope, orthopnea, palpitations, paroxysmal nocturnal dyspnea and syncope.   Respiratory: Negative for cough and shortness of breath.    Endocrine: Negative for cold intolerance and heat intolerance.   Skin: Negative for rash.   Musculoskeletal: Negative for back pain.   Gastrointestinal: Negative for abdominal pain, constipation, diarrhea, heartburn, melena, nausea and vomiting.   Genitourinary: Negative for dysuria, flank pain and hematuria.   Neurological: Negative for dizziness.   Psychiatric/Behavioral: Negative for altered mental status and depression.         Past Medical History:   Diagnosis Date   • Cancer (HCC)     colon   • H/O colon cancer, stage I 2/2/2021   • Hypertension    • Neuropathy 2/2/2021   • Other hemorrhoids 2/2/2021   • Type 2 diabetes mellitus with circulatory disorder, without long-term current use of insulin (HCC) 8/3/2021   • Vitamin D deficiency 8/3/2021         Past Surgical History:   Procedure Laterality Date   • OTHER ABDOMINAL SURGERY      colon resection         Current Outpatient Medications   Medication Sig Dispense Refill   • chlorthalidone (HYGROTON) 25 MG Tab Take 1 Tablet by  mouth every day. 90 Tablet 3   • metFORMIN ER (GLUCOPHAGE XR) 500 MG TABLET SR 24 HR TAKE ONE TABLET BY MOUTH ONE TIME DAILY 100 Tablet 1   • omeprazole (PRILOSEC) 20 MG delayed-release capsule Take 1 Capsule by mouth every day. 100 Capsule 0   • atorvastatin (LIPITOR) 80 MG tablet TAKE ONE TABLET BY MOUTH ONE TIME DAILY 100 Tablet 1   • ezetimibe (ZETIA) 10 MG Tab Take 1 Tablet by mouth every day. 100 Tablet 3   • clopidogrel (PLAVIX) 75 MG Tab TAKE ONE TABLET BY MOUTH ONE TIME DAILY  100 tablet 3   • ketoconazole (NIZORAL) 2 % Cream Apply  topically every day.     • carvedilol (COREG) 25 MG Tab Take 1 tablet by mouth 2 times a day with meals. 180 tablet 3   • betamethasone dipropionate (DIPROLENE) 0.05 % Ointment Apply to affected area twice daily as needed 50 g 2   • diphenhydrAMINE (BENADRYL) 25 MG Tab Take 1 Each by mouth every 8 hours. 10 Tab 0   • EPINEPHrine (EPIPEN) 0.3 MG/0.3ML SOAJ solution for injection 1 Syringe by Injection route Once PRN (for severe allergic reaction) for up to 1 dose. 1 Each 1   • EPINEPHrine 0.3 MG/0.3ML ANNY 2 Applicators by Injection route as needed. 1 Device 1     No current facility-administered medications for this visit.         Allergies   Allergen Reactions   • Peanut-Derived Anaphylaxis   • Food      Cereals: Barley, Buckwheat , corn, oat, wheat   Meats: turkey  Fruits: apple, cantaloupe/ muskmelon, grape white , lemon, orange, peach, pear, watermelon.  Vegetables: Bean, green; bean, lima; cabbage, carrot,celery, lettuce, pea, pepper, green; soybean  Seafood: lobster , Oyster, shrimp  Nuts/ seeds: Holcomb, Cashew, coconut, Filbert/Hazelnut,Peanut, Pecan, Pistachio nut,Sesame seed/oil, walnut, flaxseed  Spices/other: Chocolate/ cacao bean, cumin, dill, garlic, mustard,pepper,Histamine control     • Iodine          History reviewed. No pertinent family history.      Social History     Socioeconomic History   • Marital status:      Spouse name: Not on file   • Number of  children: Not on file   • Years of education: Not on file   • Highest education level: Not on file   Occupational History   • Not on file   Tobacco Use   • Smoking status: Former Smoker   • Smokeless tobacco: Never Used   • Tobacco comment: quit 23 yrs ago   Vaping Use   • Vaping Use: Never used   Substance and Sexual Activity   • Alcohol use: Yes     Alcohol/week: 4.2 oz     Types: 7 Standard drinks or equivalent per week     Comment: daily cocktail (vodka rocks) martini every night   • Drug use: No   • Sexual activity: Not Currently   Other Topics Concern   • Not on file   Social History Narrative   • Not on file     Social Determinants of Health     Financial Resource Strain:    • Difficulty of Paying Living Expenses: Not on file   Food Insecurity:    • Worried About Running Out of Food in the Last Year: Not on file   • Ran Out of Food in the Last Year: Not on file   Transportation Needs:    • Lack of Transportation (Medical): Not on file   • Lack of Transportation (Non-Medical): Not on file   Physical Activity:    • Days of Exercise per Week: Not on file   • Minutes of Exercise per Session: Not on file   Stress:    • Feeling of Stress : Not on file   Social Connections:    • Frequency of Communication with Friends and Family: Not on file   • Frequency of Social Gatherings with Friends and Family: Not on file   • Attends Holiness Services: Not on file   • Active Member of Clubs or Organizations: Not on file   • Attends Club or Organization Meetings: Not on file   • Marital Status: Not on file   Intimate Partner Violence:    • Fear of Current or Ex-Partner: Not on file   • Emotionally Abused: Not on file   • Physically Abused: Not on file   • Sexually Abused: Not on file   Housing Stability:    • Unable to Pay for Housing in the Last Year: Not on file   • Number of Places Lived in the Last Year: Not on file   • Unstable Housing in the Last Year: Not on file         Physical Exam:  Ambulatory Vitals  /78 (BP  "Location: Left arm, Patient Position: Sitting, BP Cuff Size: Adult)   Pulse (!) 58   Resp 14   Ht 1.676 m (5' 6\")   Wt 78.5 kg (173 lb)   SpO2 96%    BP Readings from Last 4 Encounters:   12/13/21 138/78   08/03/21 132/76   06/01/21 146/82   02/02/21 136/90     Weight/BMI:   Vitals:    12/13/21 1325   BP: 138/78   Weight: 78.5 kg (173 lb)   Height: 1.676 m (5' 6\")    Body mass index is 27.92 kg/m².  Wt Readings from Last 4 Encounters:   12/13/21 78.5 kg (173 lb)   08/03/21 79.4 kg (175 lb 1.6 oz)   06/01/21 77.1 kg (170 lb)   02/02/21 76.6 kg (168 lb 12.8 oz)       Physical Exam  Constitutional:       General: He is not in acute distress.  HENT:      Head: Normocephalic and atraumatic.   Eyes:      Conjunctiva/sclera: Conjunctivae normal.      Pupils: Pupils are equal, round, and reactive to light.   Neck:      Vascular: No JVD.   Cardiovascular:      Rate and Rhythm: Normal rate and regular rhythm.      Heart sounds: Normal heart sounds. No murmur heard.  No friction rub. No gallop.    Pulmonary:      Effort: Pulmonary effort is normal. No respiratory distress.      Breath sounds: Normal breath sounds. No wheezing or rales.   Chest:      Chest wall: No tenderness.   Abdominal:      General: Bowel sounds are normal. There is no distension.      Palpations: Abdomen is soft.   Musculoskeletal:      Cervical back: Normal range of motion and neck supple.   Skin:     General: Skin is warm and dry.   Neurological:      Mental Status: He is alert and oriented to person, place, and time.   Psychiatric:         Mood and Affect: Affect normal.         Judgment: Judgment normal.           Lab Data Review:  Lab Results   Component Value Date/Time    CHOLSTRLTOT 122 05/17/2021 12:48 PM    LDL 57 05/17/2021 12:48 PM    HDL 46 05/17/2021 12:48 PM    TRIGLYCERIDE 94 05/17/2021 12:48 PM       Lab Results   Component Value Date/Time    SODIUM 140 05/17/2021 12:48 PM    POTASSIUM 4.2 05/17/2021 12:48 PM    CHLORIDE 103 05/17/2021 " 12:48 PM    CO2 26 05/17/2021 12:48 PM    GLUCOSE 110 (H) 05/17/2021 12:48 PM    BUN 20 05/17/2021 12:48 PM    CREATININE 1.26 05/17/2021 12:48 PM     CrCl cannot be calculated (Patient's most recent lab result is older than the maximum 7 days allowed.).  Lab Results   Component Value Date/Time    ALKPHOSPHAT 78 05/17/2021 12:48 PM    ASTSGOT 25 05/17/2021 12:48 PM    ALTSGPT 26 05/17/2021 12:48 PM    TBILIRUBIN 1.2 05/17/2021 12:48 PM      Lab Results   Component Value Date/Time    WBC 5.6 05/17/2021 12:48 PM     Lab Results   Component Value Date/Time    HBA1C 6.7 (H) 05/17/2021 12:48 PM     No components found for: TROP      Cardiac Imaging and Procedures Review:      EKG 11/2019 sinus, first deg AVB    Calcium score - 1592    Nuclear cindy SPECT 3/20/20  NUCLEAR IMAGING INTERPRETATION   Normal myocardial perfusion with no ischemia.   Normal left ventricular wall motion.  LV ejection fraction = 69%.   ECG INTERPRETATION   Negative stress ECG for ischemia.    TTE 11/2019  CONCLUSIONS  No prior study is available for comparison.   Left ventricular ejection fraction is visually estimated to be greater   than 75%.  Hyperdynamic left ventricular systolic function.  Normal diastolic function.  The right ventricle was normal in size and function.  No significant valve disease or flow abnormalities.   Left Ventricle  Normal left ventricular chamber size. Mild concentric left ventricular   hypertrophy. Hyperdynamic left ventricular systolic function. Left   ventricular ejection fraction is visually estimated to be greater than   75%. Normal regional wall motion. Normal diastolic function.    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 Reduce chlorthalidone. Repeat BMP.     CAD on CT - continue statin and zetia. Annual lipids. Continue plavix (allergic to aspirin).     It was my pleasure to meet with Mr. Deng.

## 2021-12-21 RX ORDER — OMEPRAZOLE 20 MG/1
20 CAPSULE, DELAYED RELEASE ORAL DAILY
Qty: 100 CAPSULE | Refills: 3 | Status: SHIPPED | OUTPATIENT
Start: 2021-12-21 | End: 2023-03-20

## 2022-01-17 ENCOUNTER — TELEPHONE (OUTPATIENT)
Dept: CARDIOLOGY | Facility: MEDICAL CENTER | Age: 82
End: 2022-01-17

## 2022-01-17 NOTE — TELEPHONE ENCOUNTER
VR    It called regarding a Lab Slip request be sent to the [facility]. Please call pt when order has been sent at [phone].    Thank you

## 2022-01-19 NOTE — TELEPHONE ENCOUNTER
Lab slip has been faxed to [facility]: [phone]. Patient notified.  Thank you!

## 2022-01-22 ENCOUNTER — HOSPITAL ENCOUNTER (EMERGENCY)
Facility: MEDICAL CENTER | Age: 82
End: 2022-01-23
Attending: EMERGENCY MEDICINE
Payer: MEDICARE

## 2022-01-22 VITALS
HEIGHT: 66 IN | BODY MASS INDEX: 27.32 KG/M2 | HEART RATE: 55 BPM | TEMPERATURE: 97.8 F | OXYGEN SATURATION: 94 % | WEIGHT: 170 LBS | RESPIRATION RATE: 21 BRPM | DIASTOLIC BLOOD PRESSURE: 76 MMHG | SYSTOLIC BLOOD PRESSURE: 129 MMHG

## 2022-01-22 DIAGNOSIS — T78.40XA ALLERGIC REACTION, INITIAL ENCOUNTER: ICD-10-CM

## 2022-01-22 PROCEDURE — 99284 EMERGENCY DEPT VISIT MOD MDM: CPT

## 2022-01-22 RX ORDER — DIPHENHYDRAMINE HCL 25 MG
25 TABLET ORAL ONCE
Status: DISCONTINUED | OUTPATIENT
Start: 2022-01-23 | End: 2022-01-23 | Stop reason: HOSPADM

## 2022-01-22 ASSESSMENT — FIBROSIS 4 INDEX: FIB4 SCORE: 2.19

## 2022-01-23 NOTE — ED NOTES
Pt provided with d/c paper work and follow up care. Pt declines questions. Pt ambulated out of ER.

## 2022-01-23 NOTE — ED PROVIDER NOTES
ER Provider Note     Scribed for Peña Fung M.D. by Camilo Mendoza. 1/22/2022, 10:59 PM.    Primary Care Provider: Debbie Clancy M.D.  Means of Arrival: EMS   History obtained from: Patient  History limited by: None     CHIEF COMPLAINT  Chief Complaint   Patient presents with    Allergic Reaction     had carrots/celery and ranch when started to have allergic reaction      \A [facility]ology of [place] [facility]\""  Morris Deng is a 81 y.o. male who presents to the Emergency Department via EMS for an allergic reaction that occurred prior to arrival. The patient has allergies to peanuts, but states he was eating carrots, celery, and ranch when the reaction happened. He reports associated tongue swelling and facial swelling. Per EMS, the patient was treated with 125mg solumedrol and 50mg benadryl en route which did not alleviate his symptoms. The patient states that he has an epi pen, but denies using it. He denies associated abdominal pain or diarrhea.    REVIEW OF SYSTEMS  See HPI for further details.     PAST MEDICAL HISTORY   has a past medical history of Cancer ([facility]), H/O colon cancer, stage I (2/2/2021), Hypertension, Neuropathy (2/2/2021), Other hemorrhoids (2/2/2021), Type 2 diabetes mellitus with circulatory disorder, without long-term current use of insulin (HCC) (8/3/2021), and Vitamin D deficiency (8/3/2021).    SURGICAL HISTORY   has a past surgical history that includes other abdominal surgery.    SOCIAL HISTORY  Social History     Tobacco Use    Smoking status: Former Smoker    Smokeless tobacco: Never Used    Tobacco comment: quit 23 yrs ago   Vaping Use    Vaping Use: Never used   Substance Use Topics    Alcohol use: Yes     Alcohol/week: 4.2 oz     Types: 7 Standard drinks or equivalent per week     Comment: daily cocktail (vodka rocks) martini every night    Drug use: No      Social History     Substance and Sexual Activity   Drug Use No     FAMILY HISTORY  No family history noted.    CURRENT MEDICATIONS  Home Medications   "  **Home medications have not yet been reviewed for this encounter**       ALLERGIES  Allergies   Allergen Reactions    Peanut-Derived Anaphylaxis    Food      Cereals: Barley, Buckwheat , corn, oat, wheat   Meats: turkey  Fruits: apple, cantaloupe/ muskmelon, grape white , lemon, orange, peach, pear, watermelon.  Vegetables: Bean, green; bean, lima; cabbage, carrot,celery, lettuce, pea, pepper, green; soybean  Seafood: lobster , Oyster, shrimp  Nuts/ seeds: [place], Cashew, coconut, Filbert/Hazelnut,Peanut, Pecan, Pistachio nut,Sesame seed/oil, walnut, flaxseed  Spices/other: Chocolate/ cacao bean, cumin, dill, garlic, mustard,pepper,Histamine control      Iodine        PHYSICAL EXAM  VITAL SIGNS: /84   Pulse (!) 56   Temp 36.6 °C (97.8 °F) (Temporal)   Resp 18   Ht 1.676 m (5' 6\")   Wt 77.1 kg (170 lb)   SpO2 96%   BMI 27.44 kg/m²    Constitutional: Alert in no apparent distress.  HENT: Swelling aorund right eye, No signs of trauma, Bilateral external ears normal, Swelling underneath tongue, Nose normal.   Eyes: Pupils are equal and reactive, Conjunctiva normal, Non-icteric.   Neck: Normal range of motion, No tenderness, Supple, No stridor.   Lymphatic: No lymphadenopathy noted.   Cardiovascular: Regular rate and rhythm, no palpable thrill  Thorax & Lungs: No wheezing or respiratory distress,  No chest tenderness.  CTAB  Abdomen: Bowel sounds normal, Soft, No tenderness, No masses, No pulsatile masses. No peritoneal signs.  Skin: Warm, Dry, No erythema, No rash.   Back: No bony tenderness, No CVA tenderness.   Extremities: Intact distal pulses, No edema, No tenderness, No cyanosis.  Musculoskeletal: Good range of motion in all major joints. No tenderness to palpation or major deformities noted.   Neurologic: Alert , Normal motor function, Normal sensory function, No focal deficits noted.   Psychiatric: Affect normal, Judgment normal, Mood normal.    COURSE & MEDICAL DECISION MAKING  Pertinent Labs & " Imaging studies reviewed. (See chart for details)    This is a 81 y.o. male that presents with an allergic reaction.  He does not be criteria for anaphylaxis and I will observe him after he was given Benadryl as well as Solu-Medrol.  His breathing is not encumbered at this time.  We will reassess..     10:59 PM - Patient seen and examined at bedside.     11:32 PM -  I reevaluated the patient at bedside. The patient's tongue and facial swelling has reduced.  He is feeling safe to go home.  He does have an EpiPen at home and I instructed him on its use.  I discussed plan for discharge and follow up as outlined below. The patient is stable for discharge at this time and will return for any new or worsening symptoms. Patient verbalizes understanding and support with my plan for discharge.     The patient is referred to a primary physician for blood pressure management, diabetic screening, and for all other preventative health concerns.    DISPOSITION:  Patient will be discharged home in stable condition.    FOLLOW UP:  Debbie Clancy M.D.  [address] [place] [id number] [phone]        FINAL IMPRESSION  1. Allergic reaction, initial encounter       Camilo EVANS (Lonnyibcedric), am scribing for, and in the presence of, Peña Fung M.D..    Electronically signed by: Camilo Mendoza (Poonam), 1/22/2022    Peña EVANS M.D. personally performed the services described in this documentation, as scribed by Camilo Mendoza in my presence, and it is both accurate and complete. E.    The note accurately reflects work and decisions made by me.  Peña Fung M.D.  1/23/2022  4:51 AM

## 2022-01-25 ENCOUNTER — HOSPITAL ENCOUNTER (OUTPATIENT)
Dept: LAB | Facility: MEDICAL CENTER | Age: 82
End: 2022-01-25
Attending: INTERNAL MEDICINE
Payer: MEDICARE

## 2022-01-25 ENCOUNTER — PATIENT MESSAGE (OUTPATIENT)
Dept: HEALTH INFORMATION MANAGEMENT | Facility: OTHER | Age: 82
End: 2022-01-25
Payer: MEDICARE

## 2022-01-25 DIAGNOSIS — E78.49 OTHER HYPERLIPIDEMIA: ICD-10-CM

## 2022-01-25 LAB
ALBUMIN SERPL BCP-MCNC: 4.4 G/DL (ref 3.2–4.9)
ALBUMIN/GLOB SERPL: 1.8 G/DL
ALP SERPL-CCNC: 74 U/L (ref 30–99)
ALT SERPL-CCNC: 25 U/L (ref 2–50)
ANION GAP SERPL CALC-SCNC: 13 MMOL/L (ref 7–16)
AST SERPL-CCNC: 28 U/L (ref 12–45)
BILIRUB SERPL-MCNC: 0.9 MG/DL (ref 0.1–1.5)
BUN SERPL-MCNC: 23 MG/DL (ref 8–22)
CALCIUM SERPL-MCNC: 9.9 MG/DL (ref 8.5–10.5)
CHLORIDE SERPL-SCNC: 103 MMOL/L (ref 96–112)
CO2 SERPL-SCNC: 25 MMOL/L (ref 20–33)
CREAT SERPL-MCNC: 1.15 MG/DL (ref 0.5–1.4)
GLOBULIN SER CALC-MCNC: 2.4 G/DL (ref 1.9–3.5)
GLUCOSE SERPL-MCNC: 121 MG/DL (ref 65–99)
POTASSIUM SERPL-SCNC: 4 MMOL/L (ref 3.6–5.5)
PROT SERPL-MCNC: 6.8 G/DL (ref 6–8.2)
SODIUM SERPL-SCNC: 141 MMOL/L (ref 135–145)

## 2022-01-25 PROCEDURE — 36415 COLL VENOUS BLD VENIPUNCTURE: CPT

## 2022-01-25 PROCEDURE — 80053 COMPREHEN METABOLIC PANEL: CPT

## 2022-01-31 ENCOUNTER — HOSPITAL ENCOUNTER (OUTPATIENT)
Dept: LAB | Facility: MEDICAL CENTER | Age: 82
End: 2022-01-31
Attending: INTERNAL MEDICINE
Payer: MEDICARE

## 2022-01-31 DIAGNOSIS — E55.9 VITAMIN D DEFICIENCY: ICD-10-CM

## 2022-01-31 LAB
25(OH)D3 SERPL-MCNC: 41 NG/ML (ref 30–100)
CREAT UR-MCNC: 168.61 MG/DL
MICROALBUMIN UR-MCNC: <1.2 MG/DL
MICROALBUMIN/CREAT UR: NORMAL MG/G (ref 0–30)

## 2022-01-31 PROCEDURE — 82306 VITAMIN D 25 HYDROXY: CPT

## 2022-01-31 PROCEDURE — 82570 ASSAY OF URINE CREATININE: CPT

## 2022-01-31 PROCEDURE — 36415 COLL VENOUS BLD VENIPUNCTURE: CPT

## 2022-01-31 PROCEDURE — 82043 UR ALBUMIN QUANTITATIVE: CPT

## 2022-02-03 ENCOUNTER — TELEPHONE (OUTPATIENT)
Dept: MEDICAL GROUP | Facility: PHYSICIAN GROUP | Age: 82
End: 2022-02-03

## 2022-02-03 NOTE — TELEPHONE ENCOUNTER
ESTABLISHED PATIENT PRE-VISIT PLANNING     Patient was NOT contacted to complete PVP.     Note: Patient will not be contacted if there is no indication to call.     1.  Reviewed notes from the last few office visits within the medical group: Yes    2.  If any orders were placed at last visit or intended to be done for this visit (i.e. 6 mos follow-up), do we have Results/Consult Notes?         •  Labs - Labs ordered, completed on 1/31/2022 and results are in chart.  Note: If patient appointment is for lab review and patient did not complete labs, check with provider if OK to reschedule patient until labs completed.       •  Imaging - Imaging was not ordered at last office visit.       •  Referrals - No referrals were ordered at last office visit.    3. Is this appointment scheduled as a Hospital Follow-Up? No    4.  Immunizations were updated in Epic using Reconcile Outside Information activity? Yes    5.  Patient is due for the following Health Maintenance Topics:   Health Maintenance Due   Topic Date Due   • DIABETES MONOFILAMENT / LE EXAM  Never done   • RETINAL SCREENING  Never done   • COLORECTAL CANCER SCREENING  08/14/2020   • A1C SCREENING  11/17/2021         6.  AHA (Pulse[employer]) form printed for Provider? Email sent to [facility] requesting form

## 2022-02-08 ENCOUNTER — OFFICE VISIT (OUTPATIENT)
Dept: MEDICAL GROUP | Facility: PHYSICIAN GROUP | Age: 82
End: 2022-02-08
Payer: MEDICARE

## 2022-02-08 VITALS
SYSTOLIC BLOOD PRESSURE: 150 MMHG | DIASTOLIC BLOOD PRESSURE: 80 MMHG | TEMPERATURE: 98.4 F | HEART RATE: 51 BPM | WEIGHT: 175.7 LBS | BODY MASS INDEX: 28.24 KG/M2 | RESPIRATION RATE: 15 BRPM | HEIGHT: 66 IN | OXYGEN SATURATION: 95 %

## 2022-02-08 DIAGNOSIS — G62.9 NEUROPATHY: ICD-10-CM

## 2022-02-08 DIAGNOSIS — E11.59 TYPE 2 DIABETES MELLITUS WITH OTHER CIRCULATORY COMPLICATION, WITHOUT LONG-TERM CURRENT USE OF INSULIN (HCC): ICD-10-CM

## 2022-02-08 DIAGNOSIS — R68.2 DRY MOUTH: ICD-10-CM

## 2022-02-08 DIAGNOSIS — G62.0 POLYNEUROPATHY DUE TO DRUG (HCC): ICD-10-CM

## 2022-02-08 PROCEDURE — 99213 OFFICE O/P EST LOW 20 MIN: CPT | Performed by: INTERNAL MEDICINE

## 2022-02-08 ASSESSMENT — FIBROSIS 4 INDEX: FIB4 SCORE: 2.5

## 2022-02-09 NOTE — PROGRESS NOTES
Annual Health Assessment Questions:    1.  Are you currently engaging in any exercise or physical activity? Yes    2.  How would you describe your mood or emotional well-being today? good    3.  Have you had any falls in the last year? No    4.  Have you noticed any problems with your balance or had difficulty walking? Yes    5.  In the last six months have you experienced any leakage of urine? No    6. DPA/Advanced Directive: Patient does not have an Advanced Directive.  A packet and workshop information was given on Advanced Directives.

## 2022-02-09 NOTE — PROGRESS NOTES
CC: labs, dry mouth    HPI:  Morris presents with the following    1. Type 2 diabetes mellitus with other circulatory complication, without long-term current use of insulin (HCC)  Chronic.  Stable.  Hemoglobin A1c 6.7 in May.  Patient is complaining about dry mouth and increased symptoms of neuropathy.  Patient does not check his blood sugars at home.  Patient is taking Metformin  mg tablets daily.  Urine microalbumin within normal limits.    2.  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  Neuropathy has been getting worse over the last few months.  Patient feels more weak and is using his walker more often.    3. Dry mouth  Patient complains of dry mouth worse over the last 3 months.  His cardiologist decreased his Hygroton to 25 mg tablets.  This did not make much of a difference.  He does not check his blood sugars.  BUN slightly elevated.      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 100 Capsule 3   • chlorthalidone (HYGROTON) 25 MG Tab Take 1 Tablet by mouth every day. 90 Tablet 3   • metFORMIN ER (GLUCOPHAGE XR) 500 MG TABLET SR 24 HR TAKE ONE TABLET BY MOUTH ONE TIME DAILY 100 Tablet 1   • atorvastatin (LIPITOR) 80 MG tablet TAKE ONE TABLET BY MOUTH ONE TIME DAILY 100 Tablet 1   • ezetimibe (ZETIA) 10 MG Tab  Take 1 Tablet by mouth every day. 100 Tablet 3   • clopidogrel (PLAVIX) 75 MG Tab TAKE ONE TABLET BY MOUTH ONE TIME DAILY  100 tablet 3   • ketoconazole (NIZORAL) 2 % Cream Apply  topically every day.     • carvedilol (COREG) 25 MG Tab Take 1 tablet by mouth 2 times a day with meals. 180 tablet 3   • betamethasone dipropionate (DIPROLENE) 0.05 % Ointment Apply to affected area twice daily as needed 50 g 2   • diphenhydrAMINE (BENADRYL) 25 MG Tab Take 1 Each by mouth every 8 hours. 10 Tab 0   • EPINEPHrine (EPIPEN) 0.3 MG/0.3ML SOAJ solution for injection 1 Syringe by Injection route Once PRN (for severe allergic reaction) for up to 1 dose. 1 Each 1   • EPINEPHrine 0.3 MG/0.3ML ANNY 2 Applicators by Injection route as needed. 1 Device 1     No current facility-administered medications for this visit.         Allergies as of 02/08/2022 - Reviewed 02/08/2022   Allergen Reaction Noted   • Peanut-derived Anaphylaxis 12/01/2014   • Food  02/13/2016   • Iodine  12/03/2009        Social History     Socioeconomic History   • Marital status:      Spouse name: Not on file   • Number of children: Not on file   • Years of education: Not on file   • Highest education level: Not on file   Occupational History   • Not on file   Tobacco Use   • Smoking status: Former Smoker   • Smokeless tobacco: Never Used   • Tobacco comment: quit 23 yrs ago   Vaping Use   • Vaping Use: Never used   Substance and Sexual Activity   • Alcohol use: Yes     Alcohol/week: 4.2 oz     Types: 7 Standard drinks or equivalent per week     Comment: daily cocktail (vodka rocks) martini every night   • Drug use: No   • Sexual activity: Not Currently   Other Topics Concern   • Not on file   Social History Narrative   • Not on file     Social Determinants of Health     Financial Resource Strain:    • Difficulty of Paying Living Expenses: Not on file   Food Insecurity:    • Worried About Running Out of Food in the Last Year: Not on file   • Ran Out of  "Food in the Last Year: Not on file   Transportation Needs:    • Lack of Transportation (Medical): Not on file   • Lack of Transportation (Non-Medical): Not on file   Physical Activity:    • Days of Exercise per Week: Not on file   • Minutes of Exercise per Session: Not on file   Stress:    • Feeling of Stress : Not on file   Social Connections:    • Frequency of Communication with Friends and Family: Not on file   • Frequency of Social Gatherings with Friends and Family: Not on file   • Attends Jew Services: Not on file   • Active Member of Clubs or Organizations: Not on file   • Attends Club or Organization Meetings: Not on file   • Marital Status: Not on file   Intimate Partner Violence:    • Fear of Current or Ex-Partner: Not on file   • Emotionally Abused: Not on file   • Physically Abused: Not on file   • Sexually Abused: Not on file   Housing Stability:    • Unable to Pay for Housing in the Last Year: Not on file   • Number of Places Lived in the Last Year: Not on file   • Unstable Housing in the Last Year: Not on file       History reviewed. No pertinent family history.    Past Surgical History:   Procedure Laterality Date   • OTHER ABDOMINAL SURGERY      colon resection       ROS: Positive ROS per HPI.  Denies any Headache,Chest pain,  Shortness of breath,  Abdominal pain, Changes of bowel or bladder, Lower ext edema, Fevers, Nights sweats, Weight Changes, Focal weakness or numbness.  All other systems are negative.    /80 (BP Location: Left arm, Patient Position: Sitting, BP Cuff Size: Adult)   Pulse (!) 51   Temp 36.9 °C (98.4 °F) (Temporal)   Resp 15   Ht 1.676 m (5' 6\")   Wt 79.7 kg (175 lb 11.2 oz)   SpO2 95%   BMI 28.36 kg/m²      Constitutional: Alert, no distress, well-groomed.  Skin: Warm, dry, good turgor, no rashes in visible areas.  Eye: Equal, round and reactive, conjunctiva clear, lids normal.  ENMT: Lips without lesions, good dentition, moist mucous membranes.  Neck: Trachea " midline, no masses, no thyromegaly.  Respiratory: Unlabored respiratory effort, no cough.  Abdomen: Soft, no gross masses.  MSK: Normal gait, moves all extremities.  Neuro: Grossly non-focal. No cranial nerve deficit. Strength and sensation intact.   Psych: Alert and oriented x3, normal affect and mood.      Assessment and Plan.   81 y.o. male presenting with the following.     1. Type 2 diabetes mellitus with other circulatory complication, without long-term current use of insulin (HCC)  Chronic.  Stable.  Continue Metformin at current dose.  - HEMOGLOBIN A1C; Future    2.  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  Consider gabapentin.    3. Dry mouth  Possibly related to dehydration, increase fluid intake drink 8 8 ounces of water daily.    Discontinue sodas.  Recommend humidifier.  Check hemoglobin A1c.    My total time spent caring for the patient on the day of the encounter was 20 minutes.   This does not include time spent on separately billable procedures/tests.

## 2022-02-14 ENCOUNTER — HOSPITAL ENCOUNTER (OUTPATIENT)
Dept: LAB | Facility: MEDICAL CENTER | Age: 82
End: 2022-02-14
Attending: INTERNAL MEDICINE
Payer: MEDICARE

## 2022-02-14 DIAGNOSIS — E11.59 TYPE 2 DIABETES MELLITUS WITH OTHER CIRCULATORY COMPLICATION, WITHOUT LONG-TERM CURRENT USE OF INSULIN (HCC): ICD-10-CM

## 2022-02-14 LAB
EST. AVERAGE GLUCOSE BLD GHB EST-MCNC: 140 MG/DL
HBA1C MFR BLD: 6.5 % (ref 4–5.6)

## 2022-02-14 PROCEDURE — 83036 HEMOGLOBIN GLYCOSYLATED A1C: CPT

## 2022-02-14 PROCEDURE — 36415 COLL VENOUS BLD VENIPUNCTURE: CPT

## 2022-03-01 ENCOUNTER — APPOINTMENT (RX ONLY)
Dept: URBAN - METROPOLITAN AREA CLINIC 20 | Facility: CLINIC | Age: 82
Setting detail: DERMATOLOGY
End: 2022-03-01

## 2022-03-01 DIAGNOSIS — L57.8 OTHER SKIN CHANGES DUE TO CHRONIC EXPOSURE TO NONIONIZING RADIATION: ICD-10-CM

## 2022-03-01 DIAGNOSIS — Z85.820 PERSONAL HISTORY OF MALIGNANT MELANOMA OF SKIN: ICD-10-CM

## 2022-03-01 DIAGNOSIS — L30.4 ERYTHEMA INTERTRIGO: ICD-10-CM

## 2022-03-01 DIAGNOSIS — L81.4 OTHER MELANIN HYPERPIGMENTATION: ICD-10-CM

## 2022-03-01 DIAGNOSIS — D22 MELANOCYTIC NEVI: ICD-10-CM

## 2022-03-01 DIAGNOSIS — Z85.828 PERSONAL HISTORY OF OTHER MALIGNANT NEOPLASM OF SKIN: ICD-10-CM

## 2022-03-01 DIAGNOSIS — D18.0 HEMANGIOMA: ICD-10-CM

## 2022-03-01 DIAGNOSIS — L82.1 OTHER SEBORRHEIC KERATOSIS: ICD-10-CM

## 2022-03-01 DIAGNOSIS — Q82.5 CONGENITAL NON-NEOPLASTIC NEVUS: ICD-10-CM

## 2022-03-01 PROBLEM — D22.5 MELANOCYTIC NEVI OF TRUNK: Status: ACTIVE | Noted: 2022-03-01

## 2022-03-01 PROBLEM — D18.01 HEMANGIOMA OF SKIN AND SUBCUTANEOUS TISSUE: Status: ACTIVE | Noted: 2022-03-01

## 2022-03-01 PROCEDURE — 99213 OFFICE O/P EST LOW 20 MIN: CPT

## 2022-03-01 PROCEDURE — ? PRESCRIPTION

## 2022-03-01 PROCEDURE — ? COUNSELING

## 2022-03-01 PROCEDURE — ? ADDITIONAL NOTES

## 2022-03-01 PROCEDURE — ? DIAGNOSIS COMMENT

## 2022-03-01 RX ORDER — KETOCONAZOLE 20 MG/G
CREAM TOPICAL QD
Qty: 60 | Refills: 6 | Status: ERX | COMMUNITY
Start: 2022-03-01

## 2022-03-01 RX ADMIN — KETOCONAZOLE: 20 CREAM TOPICAL at 00:00

## 2022-03-01 ASSESSMENT — LOCATION ZONE DERM
LOCATION ZONE: ARM
LOCATION ZONE: LEG
LOCATION ZONE: FACE
LOCATION ZONE: NOSE
LOCATION ZONE: TRUNK

## 2022-03-01 ASSESSMENT — LOCATION DETAILED DESCRIPTION DERM
LOCATION DETAILED: RIGHT ANTERIOR PROXIMAL UPPER ARM
LOCATION DETAILED: INFERIOR THORACIC SPINE
LOCATION DETAILED: RIGHT INGUINAL CREASE
LOCATION DETAILED: LEFT INFERIOR CENTRAL MALAR CHEEK
LOCATION DETAILED: RIGHT INFERIOR UPPER BACK
LOCATION DETAILED: LEFT DISTAL DORSAL FOREARM
LOCATION DETAILED: LEFT MEDIAL SUPERIOR CHEST
LOCATION DETAILED: LEFT FOREHEAD
LOCATION DETAILED: RIGHT DISTAL DORSAL FOREARM
LOCATION DETAILED: NASAL TIP
LOCATION DETAILED: LEFT ANTERIOR PROXIMAL UPPER ARM
LOCATION DETAILED: RIGHT CENTRAL MALAR CHEEK
LOCATION DETAILED: RIGHT MID-UPPER BACK
LOCATION DETAILED: LEFT INGUINAL CREASE
LOCATION DETAILED: LEFT ANTERIOR DISTAL THIGH
LOCATION DETAILED: RIGHT SUPERIOR MEDIAL UPPER BACK
LOCATION DETAILED: RIGHT ANTERIOR DISTAL THIGH

## 2022-03-01 ASSESSMENT — LOCATION SIMPLE DESCRIPTION DERM
LOCATION SIMPLE: LEFT UPPER ARM
LOCATION SIMPLE: RIGHT THIGH
LOCATION SIMPLE: LEFT FOREARM
LOCATION SIMPLE: RIGHT FOREARM
LOCATION SIMPLE: RIGHT CHEEK
LOCATION SIMPLE: UPPER BACK
LOCATION SIMPLE: LEFT FOREHEAD
LOCATION SIMPLE: RIGHT UPPER ARM
LOCATION SIMPLE: GROIN
LOCATION SIMPLE: LEFT THIGH
LOCATION SIMPLE: LEFT CHEEK
LOCATION SIMPLE: CHEST
LOCATION SIMPLE: RIGHT UPPER BACK
LOCATION SIMPLE: NOSE

## 2022-03-01 NOTE — PROCEDURE: ADDITIONAL NOTES
Render Risk Assessment In Note?: no
Additional Notes: Will request medical records from Dr. Lazcano
Detail Level: Simple
Additional Notes: Plan w/flares:\\n1. OTC hydrocortisone 1x/day x 5 days\\n2. Ketoconazole 1x/day x 2 weeks\\n3. Zeazorb power daily
Additional Notes: If no improvement consider Diflucan

## 2022-03-01 NOTE — HPI: MELANOMA F/U (HISTORY OF MALIGNANT MELANOMA)
What Is The Reason For Today's Visit?: Surveillance against skin cancer recurrences
Year Excised?: 1989

## 2022-04-12 ENCOUNTER — APPOINTMENT (OUTPATIENT)
Dept: MEDICAL GROUP | Facility: PHYSICIAN GROUP | Age: 82
End: 2022-04-12
Payer: MEDICARE

## 2022-05-11 ENCOUNTER — TELEPHONE (OUTPATIENT)
Dept: HEALTH INFORMATION MANAGEMENT | Facility: OTHER | Age: 82
End: 2022-05-11
Payer: MEDICARE

## 2022-05-31 PROBLEM — E66.3 OVERWEIGHT WITH BODY MASS INDEX (BMI) OF 28 TO 28.9 IN ADULT: Status: ACTIVE | Noted: 2022-05-31

## 2022-05-31 PROBLEM — I77.9 DISORDER OF ARTERIES AND ARTERIOLES (HCC): Status: ACTIVE | Noted: 2022-05-31

## 2022-05-31 PROBLEM — G62.0 DRUG-INDUCED POLYNEUROPATHY (HCC): Status: ACTIVE | Noted: 2022-05-31

## 2022-05-31 PROBLEM — K44.9 HIATAL HERNIA: Status: ACTIVE | Noted: 2022-05-31

## 2022-06-07 ENCOUNTER — OFFICE VISIT (OUTPATIENT)
Dept: MEDICAL GROUP | Facility: PHYSICIAN GROUP | Age: 82
End: 2022-06-07
Payer: MEDICARE

## 2022-06-07 VITALS
BODY MASS INDEX: 28.91 KG/M2 | RESPIRATION RATE: 15 BRPM | HEIGHT: 65 IN | TEMPERATURE: 97.9 F | SYSTOLIC BLOOD PRESSURE: 150 MMHG | DIASTOLIC BLOOD PRESSURE: 78 MMHG | WEIGHT: 173.5 LBS | OXYGEN SATURATION: 95 % | HEART RATE: 52 BPM

## 2022-06-07 DIAGNOSIS — E11.59 TYPE 2 DIABETES MELLITUS WITH OTHER CIRCULATORY COMPLICATION, WITHOUT LONG-TERM CURRENT USE OF INSULIN (HCC): ICD-10-CM

## 2022-06-07 DIAGNOSIS — E78.49 OTHER HYPERLIPIDEMIA: ICD-10-CM

## 2022-06-07 DIAGNOSIS — B35.1 ONYCHOMYCOSIS: ICD-10-CM

## 2022-06-07 DIAGNOSIS — Z85.038 H/O COLON CANCER, STAGE I: ICD-10-CM

## 2022-06-07 LAB
HBA1C MFR BLD: 6.3 % (ref 0–5.6)
INT CON NEG: NEGATIVE
INT CON POS: POSITIVE

## 2022-06-07 PROCEDURE — 99214 OFFICE O/P EST MOD 30 MIN: CPT | Performed by: INTERNAL MEDICINE

## 2022-06-07 PROCEDURE — 83036 HEMOGLOBIN GLYCOSYLATED A1C: CPT | Performed by: INTERNAL MEDICINE

## 2022-06-07 ASSESSMENT — FIBROSIS 4 INDEX: FIB4 SCORE: 2.53

## 2022-06-13 ENCOUNTER — TELEPHONE (OUTPATIENT)
Dept: CARDIOLOGY | Facility: MEDICAL CENTER | Age: 82
End: 2022-06-13
Payer: MEDICARE

## 2022-06-17 ENCOUNTER — TELEPHONE (OUTPATIENT)
Dept: CARDIOLOGY | Facility: MEDICAL CENTER | Age: 82
End: 2022-06-17
Payer: MEDICARE

## 2022-06-17 DIAGNOSIS — I25.10 ASCVD (ARTERIOSCLEROTIC CARDIOVASCULAR DISEASE): ICD-10-CM

## 2022-06-17 DIAGNOSIS — I10 ESSENTIAL HYPERTENSION: ICD-10-CM

## 2022-06-17 DIAGNOSIS — E78.49 OTHER HYPERLIPIDEMIA: ICD-10-CM

## 2022-06-17 DIAGNOSIS — E78.5 HYPERLIPIDEMIA, UNSPECIFIED HYPERLIPIDEMIA TYPE: ICD-10-CM

## 2022-06-17 DIAGNOSIS — R93.1 AGATSTON CORONARY ARTERY CALCIUM SCORE GREATER THAN 400: ICD-10-CM

## 2022-06-17 NOTE — TELEPHONE ENCOUNTER
----- Message from Annika Kuo sent at 6/13/2022 10:33 AM PDT -----  Regarding: Labwork?  This very nice PT asked if he needed labwork for his upcoming appt with [facility] while I was rescheduling the appt- I let him know I would ask his nurse and they would reach out if he did =)   SLC

## 2022-06-23 ENCOUNTER — HOSPITAL ENCOUNTER (OUTPATIENT)
Facility: MEDICAL CENTER | Age: 82
End: 2022-06-23
Attending: FAMILY MEDICINE
Payer: MEDICARE

## 2022-06-23 ENCOUNTER — OFFICE VISIT (OUTPATIENT)
Dept: MEDICAL GROUP | Facility: PHYSICIAN GROUP | Age: 82
End: 2022-06-23
Payer: MEDICARE

## 2022-06-23 VITALS
BODY MASS INDEX: 28.32 KG/M2 | DIASTOLIC BLOOD PRESSURE: 78 MMHG | HEART RATE: 50 BPM | SYSTOLIC BLOOD PRESSURE: 150 MMHG | RESPIRATION RATE: 15 BRPM | HEIGHT: 65 IN | OXYGEN SATURATION: 94 % | WEIGHT: 170 LBS | TEMPERATURE: 98.4 F

## 2022-06-23 DIAGNOSIS — Z20.822 COUGH WITH EXPOSURE TO COVID-19 VIRUS: ICD-10-CM

## 2022-06-23 DIAGNOSIS — R05.8 COUGH WITH EXPOSURE TO COVID-19 VIRUS: ICD-10-CM

## 2022-06-23 LAB
EXTERNAL QUALITY CONTROL: NORMAL
SARS-COV+SARS-COV-2 AG RESP QL IA.RAPID: NEGATIVE

## 2022-06-23 PROCEDURE — 99213 OFFICE O/P EST LOW 20 MIN: CPT | Mod: CS | Performed by: INTERNAL MEDICINE

## 2022-06-23 PROCEDURE — 87426 SARSCOV CORONAVIRUS AG IA: CPT | Performed by: INTERNAL MEDICINE

## 2022-06-23 PROCEDURE — U0005 INFEC AGEN DETEC AMPLI PROBE: HCPCS

## 2022-06-23 PROCEDURE — U0003 INFECTIOUS AGENT DETECTION BY NUCLEIC ACID (DNA OR RNA); SEVERE ACUTE RESPIRATORY SYNDROME CORONAVIRUS 2 (SARS-COV-2) (CORONAVIRUS DISEASE [COVID-19]), AMPLIFIED PROBE TECHNIQUE, MAKING USE OF HIGH THROUGHPUT TECHNOLOGIES AS DESCRIBED BY CMS-2020-01-R: HCPCS

## 2022-06-23 ASSESSMENT — FIBROSIS 4 INDEX: FIB4 SCORE: 2.53

## 2022-06-23 NOTE — PROGRESS NOTES
CC: Requesting COVID.    HPI:  Morris presents with the following    1. Cough with exposure to COVID-19 virus  Patient is here today because he will be moving into an independent living community ( [facility] [address] [place])  and will need to have a negative COVID test.  He is fully vaccinated administered.  He is concerned as he may have had a possible COVID exposure presents with a mild cough for 1 day.      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/31/2022   • Drug-induced polyneuropathy (HCC) 05/31/2022   • BMI 28.0-28.9,adult 05/31/2022   • Overweight with body mass index (BMI) of 28 to 28.9 in adult 05/31/2022   • Dry mouth 02/08/2022   • Type 2 diabetes mellitus with circulatory disorder, without long-term current use of insulin ([facility]) 08/03/2021   • Vitamin D deficiency 08/03/2021   • Other hemorrhoids 02/02/2021   • H/O colon cancer, stage I 02/02/2021   • Neuropathy 02/02/2021   • Hyperlipidemia 11/07/2019   • Essential hypertension 11/07/2019   • 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 100 Capsule 3   • chlorthalidone (HYGROTON) 25 MG Tab Take 1 Tablet by mouth every day. 90 Tablet 3   • ezetimibe (ZETIA) 10 MG Tab Take 1 Tablet by mouth every day. 100 Tablet 3   • clopidogrel (PLAVIX) 75 MG Tab TAKE ONE TABLET BY MOUTH ONE TIME DAILY  100 tablet 3   • ketoconazole (NIZORAL) 2 % Cream Apply  topically every day.     • carvedilol (COREG)  25 MG Tab Take 1 tablet by mouth 2 times a day with meals. 180 tablet 3   • betamethasone dipropionate (DIPROLENE) 0.05 % Ointment Apply to affected area twice daily as needed 50 g 2   • diphenhydrAMINE (BENADRYL) 25 MG Tab Take 1 Each by mouth every 8 hours. 10 Tab 0   • EPINEPHrine (EPIPEN) 0.3 MG/0.3ML SOAJ solution for injection 1 Syringe by Injection route Once PRN (for severe allergic reaction) for up to 1 dose. 1 Each 1     No current facility-administered medications for this visit.         Allergies as of 06/23/2022 - Reviewed 06/23/2022   Allergen Reaction Noted   • Peanut-derived Anaphylaxis 12/01/2014   • Food  02/13/2016   • Iodine  12/03/2009        Social History     Socioeconomic History   • Marital status:      Spouse name: Not on file   • Number of children: Not on file   • Years of education: Not on file   • Highest education level: Not on file   Occupational History   • Not on file   Tobacco Use   • Smoking status: Former Smoker   • Smokeless tobacco: Never Used   • Tobacco comment: quit 23 yrs ago   Vaping Use   • Vaping Use: Never used   Substance and Sexual Activity   • Alcohol use: Yes     Alcohol/week: 4.2 oz     Types: 7 Standard drinks or equivalent per week     Comment: daily cocktail (vodka rocks) martini every night   • Drug use: No   • Sexual activity: Not Currently   Other Topics Concern   • Not on file   Social History Narrative   • Not on file     Social Determinants of Health     Financial Resource Strain: Not on file   Food Insecurity: Not on file   Transportation Needs: Not on file   Physical Activity: Not on file   Stress: Not on file   Social Connections: Not on file   Intimate Partner Violence: Not on file   Housing Stability: Not on file       History reviewed. No pertinent family history.    Past Surgical History:   Procedure Laterality Date   • OTHER ABDOMINAL SURGERY      colon resection       ROS: Positive  /Nonproductive cough.  Denies any Headache,Chest pain,   "Shortness of breath,  Abdominal pain, Changes of bowel or bladder, Lower ext edema, Fevers, Nights sweats, Weight Changes, Focal weakness or numbness.  All other systems are negative.    BP (!) 150/78 (BP Location: Left arm, Patient Position: Sitting, BP Cuff Size: Adult)   Pulse (!) 50   Temp 36.9 °C (98.4 °F) (Temporal)   Resp 15   Ht 1.651 m (5' 5\")   Wt 77.1 kg (170 lb)   SpO2 94%   BMI 28.29 kg/m²      Physical Exam:  Gen:         Alert and oriented, No apparent distress.  HEENT:   P[place]la, TM clear,  Oralpharynx no erythema or exudates.  Neck:       No Jugular venous distension, Lymphadenopathy, Thyromegaly, Bruits.  Lungs:     Clear to auscultation bilaterally, no wheezing rhonchi or crackles.  CV:          Regular rate and rhythm. No murmurs, rubs or gallops.  Abd:         Soft non tender, non distended. Normal active bowel sounds.             Ext:          No clubbing, cyanosis, edema.      Assessment and Plan.   82 y.o. male presenting with the following.     1. Cough with exposure to COVID-19 virus  Rapid COVID-negative.    - POCT SARS-COV Antigen ADDISON (Symptomatic only)  - SARS CoV-2 Ab, Total; Future    My total time spent caring for the patient on the day of the encounter was 20 minutes.   This does not include time spent on separately billable procedures/tests.    "

## 2022-06-24 ENCOUNTER — TELEPHONE (OUTPATIENT)
Dept: MEDICAL GROUP | Facility: PHYSICIAN GROUP | Age: 82
End: 2022-06-24
Payer: MEDICARE

## 2022-06-24 DIAGNOSIS — R05.9 COUGH: ICD-10-CM

## 2022-06-24 LAB
AMBIGUOUS DTTM AMBI4: NORMAL
COVID ORDER STATUS COVID19: NORMAL
SARS-COV-2 RNA RESP QL NAA+PROBE: NOTDETECTED
SPECIMEN SOURCE: NORMAL

## 2022-06-24 NOTE — TELEPHONE ENCOUNTER
ordered the covid blood test instead of the nasal swab pcr. Could you please sign the correct order? Thank you!

## 2022-07-06 DIAGNOSIS — I10 ESSENTIAL HYPERTENSION: ICD-10-CM

## 2022-07-09 RX ORDER — CARVEDILOL 25 MG/1
TABLET ORAL
Qty: 200 TABLET | Refills: 1 | Status: SHIPPED | OUTPATIENT
Start: 2022-07-09 | End: 2023-02-23

## 2022-08-30 ENCOUNTER — APPOINTMENT (RX ONLY)
Dept: URBAN - METROPOLITAN AREA CLINIC 20 | Facility: CLINIC | Age: 82
Setting detail: DERMATOLOGY
End: 2022-08-30

## 2022-08-30 DIAGNOSIS — Z85.820 PERSONAL HISTORY OF MALIGNANT MELANOMA OF SKIN: ICD-10-CM

## 2022-08-30 DIAGNOSIS — L30.4 ERYTHEMA INTERTRIGO: ICD-10-CM

## 2022-08-30 DIAGNOSIS — L81.4 OTHER MELANIN HYPERPIGMENTATION: ICD-10-CM

## 2022-08-30 DIAGNOSIS — L82.1 OTHER SEBORRHEIC KERATOSIS: ICD-10-CM

## 2022-08-30 DIAGNOSIS — D18.0 HEMANGIOMA: ICD-10-CM

## 2022-08-30 DIAGNOSIS — L57.8 OTHER SKIN CHANGES DUE TO CHRONIC EXPOSURE TO NONIONIZING RADIATION: ICD-10-CM

## 2022-08-30 DIAGNOSIS — Q82.5 CONGENITAL NON-NEOPLASTIC NEVUS: ICD-10-CM

## 2022-08-30 DIAGNOSIS — D22 MELANOCYTIC NEVI: ICD-10-CM

## 2022-08-30 DIAGNOSIS — Z85.828 PERSONAL HISTORY OF OTHER MALIGNANT NEOPLASM OF SKIN: ICD-10-CM

## 2022-08-30 PROBLEM — D22.5 MELANOCYTIC NEVI OF TRUNK: Status: ACTIVE | Noted: 2022-08-30

## 2022-08-30 PROBLEM — D18.01 HEMANGIOMA OF SKIN AND SUBCUTANEOUS TISSUE: Status: ACTIVE | Noted: 2022-08-30

## 2022-08-30 PROCEDURE — ? ADDITIONAL NOTES

## 2022-08-30 PROCEDURE — ? COUNSELING

## 2022-08-30 PROCEDURE — ? DIAGNOSIS COMMENT

## 2022-08-30 PROCEDURE — 99213 OFFICE O/P EST LOW 20 MIN: CPT

## 2022-08-30 ASSESSMENT — LOCATION ZONE DERM
LOCATION ZONE: FACE
LOCATION ZONE: LEG
LOCATION ZONE: ARM
LOCATION ZONE: TRUNK
LOCATION ZONE: NOSE

## 2022-08-30 ASSESSMENT — LOCATION DETAILED DESCRIPTION DERM
LOCATION DETAILED: RIGHT DISTAL DORSAL FOREARM
LOCATION DETAILED: RIGHT CENTRAL MALAR CHEEK
LOCATION DETAILED: RIGHT MID-UPPER BACK
LOCATION DETAILED: RIGHT INFERIOR UPPER BACK
LOCATION DETAILED: LEFT ANTERIOR PROXIMAL UPPER ARM
LOCATION DETAILED: LEFT INGUINAL CREASE
LOCATION DETAILED: LEFT FOREHEAD
LOCATION DETAILED: RIGHT ANTERIOR DISTAL THIGH
LOCATION DETAILED: INFERIOR THORACIC SPINE
LOCATION DETAILED: RIGHT ANTERIOR PROXIMAL UPPER ARM
LOCATION DETAILED: RIGHT SUPERIOR MEDIAL UPPER BACK
LOCATION DETAILED: RIGHT INGUINAL CREASE
LOCATION DETAILED: LEFT MEDIAL SUPERIOR CHEST
LOCATION DETAILED: LEFT ANTERIOR DISTAL THIGH
LOCATION DETAILED: LEFT DISTAL DORSAL FOREARM
LOCATION DETAILED: LEFT INFERIOR CENTRAL MALAR CHEEK
LOCATION DETAILED: NASAL TIP

## 2022-08-30 ASSESSMENT — LOCATION SIMPLE DESCRIPTION DERM
LOCATION SIMPLE: RIGHT UPPER BACK
LOCATION SIMPLE: LEFT FOREHEAD
LOCATION SIMPLE: RIGHT THIGH
LOCATION SIMPLE: LEFT CHEEK
LOCATION SIMPLE: LEFT FOREARM
LOCATION SIMPLE: RIGHT CHEEK
LOCATION SIMPLE: CHEST
LOCATION SIMPLE: RIGHT FOREARM
LOCATION SIMPLE: LEFT THIGH
LOCATION SIMPLE: UPPER BACK
LOCATION SIMPLE: GROIN
LOCATION SIMPLE: NOSE
LOCATION SIMPLE: LEFT UPPER ARM
LOCATION SIMPLE: RIGHT UPPER ARM

## 2022-08-30 NOTE — PROCEDURE: ADDITIONAL NOTES
Render Risk Assessment In Note?: no
Detail Level: Simple
Additional Notes: Plan w/flares:\\n1. OTC hydrocortisone 1x/day x 5 days\\n2. Ketoconazole 1x/day x 2 weeks\\n3. Zeazorb power daily

## 2022-08-31 RX ORDER — EZETIMIBE 10 MG/1
10 TABLET ORAL DAILY
Qty: 100 TABLET | Refills: 3 | Status: SHIPPED | OUTPATIENT
Start: 2022-08-31 | End: 2023-10-03

## 2022-09-22 ENCOUNTER — DOCUMENTATION (OUTPATIENT)
Dept: HEALTH INFORMATION MANAGEMENT | Facility: OTHER | Age: 82
End: 2022-09-22
Payer: MEDICARE

## 2022-10-18 ENCOUNTER — PATIENT MESSAGE (OUTPATIENT)
Dept: CARDIOLOGY | Facility: MEDICAL CENTER | Age: 82
End: 2022-10-18
Payer: MEDICARE

## 2022-10-18 DIAGNOSIS — E78.5 HYPERLIPIDEMIA, UNSPECIFIED HYPERLIPIDEMIA TYPE: ICD-10-CM

## 2022-10-18 NOTE — PATIENT COMMUNICATION
Is the patient due for a refill? Yes    Was the patient seen the past year? Yes    Date of last office visit: 12/13/21    Does the patient have an upcoming appointment?  Yes   If yes, When? 11/15/22    Provider to refill:[facility]    Does the patients insurance require a 100 day supply?  Yes

## 2022-10-19 RX ORDER — CLOPIDOGREL BISULFATE 75 MG/1
75 TABLET ORAL DAILY
Qty: 100 TABLET | Refills: 0 | Status: SHIPPED | OUTPATIENT
Start: 2022-10-19 | End: 2023-01-27

## 2022-12-08 ENCOUNTER — APPOINTMENT (OUTPATIENT)
Dept: MEDICAL GROUP | Facility: PHYSICIAN GROUP | Age: 82
End: 2022-12-08
Payer: MEDICARE

## 2022-12-27 ENCOUNTER — DOCUMENTATION (OUTPATIENT)
Dept: HEALTH INFORMATION MANAGEMENT | Facility: OTHER | Age: 82
End: 2022-12-27
Payer: MEDICARE

## 2023-02-14 ENCOUNTER — TELEPHONE (OUTPATIENT)
Dept: CARDIOLOGY | Facility: MEDICAL CENTER | Age: 83
End: 2023-02-14
Payer: MEDICARE

## 2023-02-14 NOTE — TELEPHONE ENCOUNTER
Chart prep:    Patient has not done labs yet. He will go have done at [facility] lab in time for his follow up with Dr. Bae 2/21/23

## 2023-02-16 ENCOUNTER — HOSPITAL ENCOUNTER (OUTPATIENT)
Dept: LAB | Facility: MEDICAL CENTER | Age: 83
End: 2023-02-16
Attending: INTERNAL MEDICINE
Payer: MEDICARE

## 2023-02-16 DIAGNOSIS — E78.5 HYPERLIPIDEMIA, UNSPECIFIED HYPERLIPIDEMIA TYPE: ICD-10-CM

## 2023-02-16 DIAGNOSIS — I10 ESSENTIAL HYPERTENSION: ICD-10-CM

## 2023-02-16 DIAGNOSIS — I25.10 ASCVD (ARTERIOSCLEROTIC CARDIOVASCULAR DISEASE): ICD-10-CM

## 2023-02-16 DIAGNOSIS — E78.49 OTHER HYPERLIPIDEMIA: ICD-10-CM

## 2023-02-16 DIAGNOSIS — R93.1 AGATSTON CORONARY ARTERY CALCIUM SCORE GREATER THAN 400: ICD-10-CM

## 2023-02-16 LAB
ALBUMIN SERPL BCP-MCNC: 4.4 G/DL (ref 3.2–4.9)
ALBUMIN/GLOB SERPL: 1.7 G/DL
ALP SERPL-CCNC: 64 U/L (ref 30–99)
ALT SERPL-CCNC: 23 U/L (ref 2–50)
ANION GAP SERPL CALC-SCNC: 8 MMOL/L (ref 7–16)
AST SERPL-CCNC: 25 U/L (ref 12–45)
BILIRUB SERPL-MCNC: 1.2 MG/DL (ref 0.1–1.5)
BUN SERPL-MCNC: 14 MG/DL (ref 8–22)
CALCIUM ALBUM COR SERPL-MCNC: 9.4 MG/DL (ref 8.5–10.5)
CALCIUM SERPL-MCNC: 9.7 MG/DL (ref 8.5–10.5)
CHLORIDE SERPL-SCNC: 106 MMOL/L (ref 96–112)
CHOLEST SERPL-MCNC: 134 MG/DL (ref 100–199)
CO2 SERPL-SCNC: 27 MMOL/L (ref 20–33)
CREAT SERPL-MCNC: 1 MG/DL (ref 0.5–1.4)
FASTING STATUS PATIENT QL REPORTED: NORMAL
GFR SERPLBLD CREATININE-BSD FMLA CKD-EPI: 76 ML/MIN/1.73 M 2
GLOBULIN SER CALC-MCNC: 2.6 G/DL (ref 1.9–3.5)
GLUCOSE SERPL-MCNC: 113 MG/DL (ref 65–99)
HDLC SERPL-MCNC: 64 MG/DL
LDLC SERPL CALC-MCNC: 57 MG/DL
POTASSIUM SERPL-SCNC: 4 MMOL/L (ref 3.6–5.5)
PROT SERPL-MCNC: 7 G/DL (ref 6–8.2)
SODIUM SERPL-SCNC: 141 MMOL/L (ref 135–145)
TRIGL SERPL-MCNC: 65 MG/DL (ref 0–149)

## 2023-02-16 PROCEDURE — 36415 COLL VENOUS BLD VENIPUNCTURE: CPT

## 2023-02-16 PROCEDURE — 80053 COMPREHEN METABOLIC PANEL: CPT

## 2023-02-16 PROCEDURE — 80061 LIPID PANEL: CPT

## 2023-02-18 DIAGNOSIS — I10 ESSENTIAL HYPERTENSION: ICD-10-CM

## 2023-02-21 ENCOUNTER — OFFICE VISIT (OUTPATIENT)
Dept: CARDIOLOGY | Facility: MEDICAL CENTER | Age: 83
End: 2023-02-21
Payer: MEDICARE

## 2023-02-21 VITALS
HEART RATE: 54 BPM | HEIGHT: 67 IN | BODY MASS INDEX: 26.37 KG/M2 | OXYGEN SATURATION: 94 % | WEIGHT: 168 LBS | SYSTOLIC BLOOD PRESSURE: 150 MMHG | DIASTOLIC BLOOD PRESSURE: 90 MMHG

## 2023-02-21 DIAGNOSIS — R93.1 AGATSTON CORONARY ARTERY CALCIUM SCORE GREATER THAN 400: ICD-10-CM

## 2023-02-21 DIAGNOSIS — I10 ESSENTIAL HYPERTENSION: ICD-10-CM

## 2023-02-21 DIAGNOSIS — E78.49 OTHER HYPERLIPIDEMIA: ICD-10-CM

## 2023-02-21 DIAGNOSIS — I77.89 ECTASIA OF ARTERY (HCC): ICD-10-CM

## 2023-02-21 PROCEDURE — 99215 OFFICE O/P EST HI 40 MIN: CPT | Performed by: INTERNAL MEDICINE

## 2023-02-21 RX ORDER — LISINOPRIL 5 MG/1
5 TABLET ORAL DAILY
Qty: 90 TABLET | Refills: 3 | Status: SHIPPED | OUTPATIENT
Start: 2023-02-21 | End: 2023-12-20 | Stop reason: SDUPTHER

## 2023-02-21 ASSESSMENT — ENCOUNTER SYMPTOMS
PND: 0
SYNCOPE: 0
ABDOMINAL PAIN: 0
FEVER: 0
PALPITATIONS: 0
WEIGHT LOSS: 0
DIZZINESS: 0
WEIGHT GAIN: 0
VOMITING: 0
ORTHOPNEA: 0
NAUSEA: 0
SHORTNESS OF BREATH: 0
ALTERED MENTAL STATUS: 0
DEPRESSION: 0
BLURRED VISION: 0
NEAR-SYNCOPE: 0
BACK PAIN: 0
IRREGULAR HEARTBEAT: 0
HEARTBURN: 0
DECREASED APPETITE: 0
CLAUDICATION: 0
COUGH: 0
FLANK PAIN: 0
CONSTIPATION: 0
DYSPNEA ON EXERTION: 0
DIARRHEA: 0

## 2023-02-21 ASSESSMENT — FIBROSIS 4 INDEX: FIB4 SCORE: 2.36

## 2023-02-22 PROCEDURE — RXMED WILLOW AMBULATORY MEDICATION CHARGE: Performed by: INTERNAL MEDICINE

## 2023-02-22 NOTE — PROGRESS NOTES
Cardiology Note    hypertension    History of Present Illness: Morris Deng is a 82 y.o. male PMH CAC on CT, HTN, HLD, DM2, small infrarenal aortic aneurysm who presents for follow up.    No cardiac complaints this visit. Compliant with medications and denies adverse effects. Describes home blood pressures elevated. Has increased stress at home. 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 Continues to walk with walker. Presents with his girlfriend today.     Review of Systems   Constitutional: Negative for decreased appetite, fever, malaise/fatigue, weight gain and weight loss.   HENT:  Negative for congestion and nosebleeds.    Eyes:  Negative for blurred vision.   Cardiovascular:  Negative for chest pain, claudication, dyspnea on exertion, irregular heartbeat, leg swelling, near-syncope, orthopnea, palpitations, paroxysmal nocturnal dyspnea and syncope.   Respiratory:  Negative for cough and shortness of breath.    Endocrine: Negative for cold intolerance and heat intolerance.   Skin:  Negative for rash.   Musculoskeletal:  Negative for back pain.   Gastrointestinal:  Negative for abdominal pain, constipation, diarrhea, heartburn, melena, nausea and vomiting.   Genitourinary:  Negative for dysuria, flank pain and hematuria.   Neurological:  Negative for dizziness.   Psychiatric/Behavioral:  Negative for altered mental status and depression.        Past Medical History:   Diagnosis Date    Cancer (HCC)     colon    Cough with exposure to COVID-19 virus 6/23/2022    Dry mouth 2/8/2022    H/O colon cancer, stage I 2/2/2021    Hypertension     Neuropathy 2/2/2021    Onychomycosis 6/7/2022    Other hemorrhoids 2/2/2021    Type 2 diabetes mellitus with circulatory disorder, without long-term current use of insulin (HCC) 8/3/2021    Vitamin D deficiency 8/3/2021         Past Surgical History:   Procedure Laterality Date    OTHER ABDOMINAL SURGERY      colon resection         Current Outpatient  Medications   Medication Sig Dispense Refill    lisinopril (PRINIVIL) 5 MG Tab Take 1 Tablet by mouth every day. 90 Tablet 3    clopidogrel (PLAVIX) 75 MG Tab TAKE 1 TABLET BY MOUTH EVERY  Tablet 0    ezetimibe (ZETIA) 10 MG Tab Take 1 Tablet by mouth every day. 100 Tablet 3    carvedilol (COREG) 25 MG Tab TAKE ONE TABLET BY MOUTH TWICE DAILY WITH MEALS 200 Tablet 1    metFORMIN ER (GLUCOPHAGE XR) 500 MG TABLET SR 24 HR TAKE ONE TABLET BY MOUTH ONE TIME DAILY 100 Tablet 3    atorvastatin (LIPITOR) 80 MG tablet TAKE ONE TABLET BY MOUTH ONE TIME DAILY 100 Tablet 3    omeprazole (PRILOSEC) 20 MG delayed-release capsule Take 1 Capsule by mouth every day. 100 Capsule 3    chlorthalidone (HYGROTON) 25 MG Tab Take 1 Tablet by mouth every day. 90 Tablet 3    ketoconazole (NIZORAL) 2 % Cream Apply  topically every day.      betamethasone dipropionate (DIPROLENE) 0.05 % Ointment Apply to affected area twice daily as needed 50 g 2    diphenhydrAMINE (BENADRYL) 25 MG Tab Take 1 Each by mouth every 8 hours. 10 Tab 0    EPINEPHrine (EPIPEN) 0.3 MG/0.3ML SOAJ solution for injection 1 Syringe by Injection route Once PRN (for severe allergic reaction) for up to 1 dose. 1 Each 1     No current facility-administered medications for this visit.         Allergies   Allergen Reactions    Peanut-Derived Anaphylaxis    Food      Cereals: Barley, Buckwheat , corn, oat, wheat   Meats: turkey  Fruits: apple, cantaloupe/ muskmelon, grape white , lemon, orange, peach, pear, watermelon.  Vegetables: Bean, green; bean, lima; cabbage, carrot,celery, lettuce, pea, pepper, green; soybean  Seafood: lobster , Oyster, shrimp  Nuts/ seeds: [place], Cashew, coconut, Filbert/Hazelnut,Peanut, Pecan, Pistachio nut,Sesame seed/oil, walnut, flaxseed  Spices/other: Chocolate/ cacao bean, cumin, dill, garlic, mustard,pepper,Histamine control      Iodine          History reviewed. No pertinent family history.      Social History     Socioeconomic History     Marital status:      Spouse name: Not on file    Number of children: Not on file    Years of education: Not on file    Highest education level: Not on file   Occupational History    Not on file   Tobacco Use    Smoking status: Former    Smokeless tobacco: Never    Tobacco comments:     quit 23 yrs ago   Vaping Use    Vaping Use: Never used   Substance and Sexual Activity    Alcohol use: Yes     Alcohol/week: 4.2 oz     Types: 7 Standard drinks or equivalent per week     Comment: daily cocktail (vodka rocks) martini every night    Drug use: No    Sexual activity: Not Currently   Other Topics Concern    Not on file   Social History Narrative    Not on file     Social Determinants of Health     Financial Resource Strain: Not on file   Food Insecurity: Not on file   Transportation Needs: Not on file   Physical Activity: Not on file   Stress: Not on file   Social Connections: Not on file   Intimate Partner Violence: Not on file   Housing Stability: Not on file         Physical Exam:  Ambulatory Vitals  There were no vitals taken for this visit.   BP Readings from Last 4 Encounters:   06/23/22 (!) 150/78   06/07/22 (!) 150/78   05/31/22 (!) 140/70   02/08/22 150/80     Weight/BMI:   There were no vitals filed for this visit.   There is no height or weight on file to calculate BMI.  Wt Readings from Last 4 Encounters:   06/23/22 77.1 kg (170 lb)   06/07/22 78.7 kg (173 lb 8 oz)   05/31/22 77.7 kg (171 lb 4.8 oz)   02/08/22 79.7 kg (175 lb 11.2 oz)       Physical Exam  Constitutional:       General: He is not in acute distress.  HENT:      Head: Normocephalic and atraumatic.   Eyes:      Conjunctiva/sclera: Conjunctivae normal.      Pupils: Pupils are equal, round, and reactive to light.   Neck:      Vascular: No JVD.   Cardiovascular:      Rate and Rhythm: Normal rate and regular rhythm.      Heart sounds: Normal heart sounds. No murmur heard.    No friction rub. No gallop.   Pulmonary:      Effort: Pulmonary  effort is normal. No respiratory distress.      Breath sounds: Normal breath sounds. No wheezing or rales.   Chest:      Chest wall: No tenderness.   Abdominal:      General: Bowel sounds are normal. There is no distension.      Palpations: Abdomen is soft.   Musculoskeletal:      Cervical back: Normal range of motion and neck supple.   Skin:     General: Skin is warm and dry.   Neurological:      Mental Status: He is alert and oriented to person, place, and time.   Psychiatric:         Mood and Affect: Affect normal.         Judgment: Judgment normal.         Lab Data Review:  Lab Results   Component Value Date/Time    CHOLSTRLTOT 134 02/16/2023 09:52 AM    LDL 57 02/16/2023 09:52 AM    HDL 64 02/16/2023 09:52 AM    TRIGLYCERIDE 65 02/16/2023 09:52 AM       Lab Results   Component Value Date/Time    SODIUM 141 02/16/2023 09:52 AM    POTASSIUM 4.0 02/16/2023 09:52 AM    CHLORIDE 106 02/16/2023 09:52 AM    CO2 27 02/16/2023 09:52 AM    GLUCOSE 113 (H) 02/16/2023 09:52 AM    BUN 14 02/16/2023 09:52 AM    CREATININE 1.00 02/16/2023 09:52 AM     CrCl cannot be calculated (Unknown ideal weight.).  Lab Results   Component Value Date/Time    ALKPHOSPHAT 64 02/16/2023 09:52 AM    ASTSGOT 25 02/16/2023 09:52 AM    ALTSGPT 23 02/16/2023 09:52 AM    TBILIRUBIN 1.2 02/16/2023 09:52 AM      Lab Results   Component Value Date/Time    WBC 5.6 05/17/2021 12:48 PM     Lab Results   Component Value Date/Time    HBA1C 6.3 (A) 06/07/2022 11:40 AM     No components found for: TROP      Cardiac Imaging and Procedures Review:      EKG 11/2019 sinus, first deg AVB    Calcium score - 1592    Nuclear cindy SPECT 3/20/20  NUCLEAR IMAGING INTERPRETATION   Normal myocardial perfusion with no ischemia.   Normal left ventricular wall motion.  LV ejection fraction = 69%.   ECG INTERPRETATION   Negative stress ECG for ischemia.    TTE 11/2019  CONCLUSIONS  No prior study is available for comparison.   Left ventricular ejection fraction is visually  estimated to be greater   than 75%.  Hyperdynamic left ventricular systolic function.  Normal diastolic function.  The right ventricle was normal in size and function.  No significant valve disease or flow abnormalities.   Left Ventricle  Normal left ventricular chamber size. Mild concentric left ventricular   hypertrophy. Hyperdynamic left ventricular systolic function. Left   ventricular ejection fraction is visually estimated to be greater than   75%. Normal regional wall motion. Normal diastolic function.    Abdominal ultrasound 6/2020  Ectasia/small aneurysm of the infrarenal abdominal aorta measuring 2.8 x 2.3 cm    Medical Decision Making:  Problem List Items Addressed This Visit       Hyperlipidemia    Relevant Medications    lisinopril (PRINIVIL) 5 MG Tab    Essential hypertension    Relevant Medications    lisinopril (PRINIVIL) 5 MG Tab    Other Relevant Orders    Basic Metabolic Panel    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    Relevant Medications    lisinopril (PRINIVIL) 5 MG Tab    Ectasia of artery (HCC)    Relevant Orders    US-AORTA/ILIACS DUPLEX COMPLETE     HTN / small infrarenal aortic aneurysm - goal 120/80. Continue carvedilol. Continue chlorthalidone. Add lisinopril. Repeat BMP. Repeat ultrasound aorta.    CAD on CT - continue statin and zetia. Annual lipids. Continue plavix (allergic to aspirin).     It was my pleasure to meet with Mr. Deng.    A total of 56 minutes of time was spent on day of encounter reviewing medical record, performing history and examination, counseling, ordering medication/test/consults and documentation.

## 2023-02-22 NOTE — TELEPHONE ENCOUNTER
Is the patient due for a refill? Yes    Was the patient seen the past year? Yes    Date of last office visit: 2/21/2023    Does the patient have an upcoming appointment?  No   If yes, When?     Provider to refill:VR    Does the patients insurance require a 100 day supply?  Yes

## 2023-02-23 ENCOUNTER — PHARMACY VISIT (OUTPATIENT)
Dept: PHARMACY | Facility: MEDICAL CENTER | Age: 83
End: 2023-02-23
Payer: MEDICARE

## 2023-02-23 RX ORDER — CARVEDILOL 25 MG/1
TABLET ORAL
Qty: 100 TABLET | Refills: 3 | Status: SHIPPED | OUTPATIENT
Start: 2023-02-23 | End: 2024-01-02 | Stop reason: SDUPTHER

## 2023-03-13 ENCOUNTER — APPOINTMENT (RX ONLY)
Dept: URBAN - METROPOLITAN AREA CLINIC 20 | Facility: CLINIC | Age: 83
Setting detail: DERMATOLOGY
End: 2023-03-13

## 2023-03-13 DIAGNOSIS — L57.0 ACTINIC KERATOSIS: ICD-10-CM

## 2023-03-13 DIAGNOSIS — D22 MELANOCYTIC NEVI: ICD-10-CM

## 2023-03-13 DIAGNOSIS — Q82.5 CONGENITAL NON-NEOPLASTIC NEVUS: ICD-10-CM

## 2023-03-13 DIAGNOSIS — Z85.820 PERSONAL HISTORY OF MALIGNANT MELANOMA OF SKIN: ICD-10-CM

## 2023-03-13 DIAGNOSIS — L82.1 OTHER SEBORRHEIC KERATOSIS: ICD-10-CM

## 2023-03-13 DIAGNOSIS — Z85.828 PERSONAL HISTORY OF OTHER MALIGNANT NEOPLASM OF SKIN: ICD-10-CM

## 2023-03-13 DIAGNOSIS — D18.0 HEMANGIOMA: ICD-10-CM

## 2023-03-13 DIAGNOSIS — L57.8 OTHER SKIN CHANGES DUE TO CHRONIC EXPOSURE TO NONIONIZING RADIATION: ICD-10-CM

## 2023-03-13 DIAGNOSIS — L81.4 OTHER MELANIN HYPERPIGMENTATION: ICD-10-CM

## 2023-03-13 PROBLEM — D22.5 MELANOCYTIC NEVI OF TRUNK: Status: ACTIVE | Noted: 2023-03-13

## 2023-03-13 PROBLEM — D18.01 HEMANGIOMA OF SKIN AND SUBCUTANEOUS TISSUE: Status: ACTIVE | Noted: 2023-03-13

## 2023-03-13 PROCEDURE — 99213 OFFICE O/P EST LOW 20 MIN: CPT | Mod: 25

## 2023-03-13 PROCEDURE — 17000 DESTRUCT PREMALG LESION: CPT

## 2023-03-13 PROCEDURE — ? COUNSELING

## 2023-03-13 PROCEDURE — ? DIAGNOSIS COMMENT

## 2023-03-13 PROCEDURE — ? LIQUID NITROGEN

## 2023-03-13 ASSESSMENT — LOCATION SIMPLE DESCRIPTION DERM
LOCATION SIMPLE: SCALP
LOCATION SIMPLE: NOSE
LOCATION SIMPLE: LEFT UPPER ARM
LOCATION SIMPLE: LEFT FOREHEAD
LOCATION SIMPLE: RIGHT UPPER BACK
LOCATION SIMPLE: RIGHT FOREARM
LOCATION SIMPLE: RIGHT CHEEK
LOCATION SIMPLE: RIGHT CLAVICULAR SKIN
LOCATION SIMPLE: LEFT FOREARM
LOCATION SIMPLE: LEFT THIGH
LOCATION SIMPLE: RIGHT THIGH
LOCATION SIMPLE: UPPER BACK
LOCATION SIMPLE: LEFT CHEEK
LOCATION SIMPLE: RIGHT UPPER ARM
LOCATION SIMPLE: CHEST

## 2023-03-13 ASSESSMENT — LOCATION ZONE DERM
LOCATION ZONE: FACE
LOCATION ZONE: NOSE
LOCATION ZONE: LEG
LOCATION ZONE: ARM
LOCATION ZONE: SCALP
LOCATION ZONE: TRUNK

## 2023-03-13 ASSESSMENT — LOCATION DETAILED DESCRIPTION DERM
LOCATION DETAILED: LEFT MEDIAL SUPERIOR CHEST
LOCATION DETAILED: RIGHT PROXIMAL DORSAL FOREARM
LOCATION DETAILED: RIGHT ANTERIOR PROXIMAL UPPER ARM
LOCATION DETAILED: RIGHT SUPERIOR MEDIAL UPPER BACK
LOCATION DETAILED: RIGHT CLAVICULAR SKIN
LOCATION DETAILED: LEFT FOREHEAD
LOCATION DETAILED: RIGHT DISTAL DORSAL FOREARM
LOCATION DETAILED: LEFT INFERIOR CENTRAL MALAR CHEEK
LOCATION DETAILED: LEFT DISTAL DORSAL FOREARM
LOCATION DETAILED: RIGHT MID-UPPER BACK
LOCATION DETAILED: LEFT ANTERIOR DISTAL THIGH
LOCATION DETAILED: RIGHT INFERIOR UPPER BACK
LOCATION DETAILED: LEFT SUPERIOR FOREHEAD
LOCATION DETAILED: RIGHT ANTERIOR DISTAL THIGH
LOCATION DETAILED: RIGHT DISTAL RADIAL DORSAL FOREARM
LOCATION DETAILED: INFERIOR THORACIC SPINE
LOCATION DETAILED: RIGHT SUPERIOR POSTAURICULAR SKIN
LOCATION DETAILED: RIGHT CENTRAL MALAR CHEEK
LOCATION DETAILED: NASAL TIP
LOCATION DETAILED: LEFT ANTERIOR PROXIMAL UPPER ARM

## 2023-03-29 PROCEDURE — RXMED WILLOW AMBULATORY MEDICATION CHARGE: Performed by: INTERNAL MEDICINE

## 2023-04-12 ENCOUNTER — PHARMACY VISIT (OUTPATIENT)
Dept: PHARMACY | Facility: MEDICAL CENTER | Age: 83
End: 2023-04-12
Payer: MEDICARE

## 2023-05-02 ENCOUNTER — TELEPHONE (OUTPATIENT)
Dept: CARDIOLOGY | Facility: MEDICAL CENTER | Age: 83
End: 2023-05-02

## 2023-05-02 ENCOUNTER — HOSPITAL ENCOUNTER (OUTPATIENT)
Dept: RADIOLOGY | Facility: MEDICAL CENTER | Age: 83
End: 2023-05-02
Attending: INTERNAL MEDICINE
Payer: MEDICARE

## 2023-05-02 DIAGNOSIS — I77.89 ECTASIA OF ARTERY (HCC): ICD-10-CM

## 2023-05-02 DIAGNOSIS — I71.43 ANEURYSM OF INFRARENAL ABDOMINAL AORTA, UNSPECIFIED WHETHER RUPTURED (HCC): ICD-10-CM

## 2023-05-02 PROCEDURE — 93978 VASCULAR STUDY: CPT

## 2023-05-02 PROCEDURE — 93978 VASCULAR STUDY: CPT | Mod: 26 | Performed by: INTERNAL MEDICINE

## 2023-05-02 NOTE — TELEPHONE ENCOUNTER
----- Message from Shyla Flor P.A.-C. sent at 5/2/2023  3:30 PM PDT -----  Please check in with patient, make sure no recent new chest pain or abdominal pain. If so should go to ER. If feeling normal then check CTA of aorta.   BP goal <120/80

## 2023-05-02 NOTE — TELEPHONE ENCOUNTER
Phone Number Called: [phone]    Call outcome: Spoke to patient regarding message below.    Message: Called to discuss JA recommendations. Advised JA ordered CT get done as soon as he can, but at least within 1-2 weeks and gave schedulers number, monitor BP notify us for BP over 120/80. Discussed ER precautions for symptoms. He stated no symptoms currently. Answered all questions and concerns, appreciative of call.

## 2023-05-02 NOTE — RESULT ENCOUNTER NOTE
Please check in with patient, make sure no recent new chest pain or abdominal pain. If so should go to ER. If feeling normal then check CTA of aorta.   BP goal <120/80

## 2023-05-05 DIAGNOSIS — E78.5 HYPERLIPIDEMIA, UNSPECIFIED HYPERLIPIDEMIA TYPE: ICD-10-CM

## 2023-05-08 ENCOUNTER — PHARMACY VISIT (OUTPATIENT)
Dept: PHARMACY | Facility: MEDICAL CENTER | Age: 83
End: 2023-05-08
Payer: MEDICARE

## 2023-05-08 PROBLEM — I71.40 ABDOMINAL AORTIC ANEURYSM (AAA) (HCC): Status: ACTIVE | Noted: 2023-05-08

## 2023-05-08 PROBLEM — I70.0 AORTIC ATHEROSCLEROSIS (HCC): Status: ACTIVE | Noted: 2023-05-08

## 2023-05-08 PROBLEM — I73.9 PAD (PERIPHERAL ARTERY DISEASE) (HCC): Status: ACTIVE | Noted: 2023-05-08

## 2023-05-08 PROCEDURE — RXMED WILLOW AMBULATORY MEDICATION CHARGE: Performed by: INTERNAL MEDICINE

## 2023-05-08 RX ORDER — CLOPIDOGREL BISULFATE 75 MG/1
75 TABLET ORAL DAILY
Qty: 100 TABLET | Refills: 2 | Status: SHIPPED | OUTPATIENT
Start: 2023-05-08

## 2023-05-18 ENCOUNTER — HOSPITAL ENCOUNTER (OUTPATIENT)
Dept: LAB | Facility: MEDICAL CENTER | Age: 83
End: 2023-05-18
Attending: INTERNAL MEDICINE
Payer: MEDICARE

## 2023-05-18 DIAGNOSIS — I10 ESSENTIAL HYPERTENSION: ICD-10-CM

## 2023-05-18 LAB
ANION GAP SERPL CALC-SCNC: 12 MMOL/L (ref 7–16)
BUN SERPL-MCNC: 19 MG/DL (ref 8–22)
CALCIUM SERPL-MCNC: 9.7 MG/DL (ref 8.5–10.5)
CHLORIDE SERPL-SCNC: 106 MMOL/L (ref 96–112)
CO2 SERPL-SCNC: 26 MMOL/L (ref 20–33)
CREAT SERPL-MCNC: 1.01 MG/DL (ref 0.5–1.4)
GFR SERPLBLD CREATININE-BSD FMLA CKD-EPI: 75 ML/MIN/1.73 M 2
GLUCOSE SERPL-MCNC: 100 MG/DL (ref 65–99)
POTASSIUM SERPL-SCNC: 4.4 MMOL/L (ref 3.6–5.5)
SODIUM SERPL-SCNC: 144 MMOL/L (ref 135–145)

## 2023-05-18 PROCEDURE — 80048 BASIC METABOLIC PNL TOTAL CA: CPT

## 2023-05-18 PROCEDURE — 36415 COLL VENOUS BLD VENIPUNCTURE: CPT

## 2023-05-22 ENCOUNTER — HOSPITAL ENCOUNTER (OUTPATIENT)
Dept: RADIOLOGY | Facility: MEDICAL CENTER | Age: 83
End: 2023-05-22
Attending: PHYSICIAN ASSISTANT
Payer: MEDICARE

## 2023-05-22 DIAGNOSIS — I71.43 ANEURYSM OF INFRARENAL ABDOMINAL AORTA, UNSPECIFIED WHETHER RUPTURED (HCC): ICD-10-CM

## 2023-05-22 PROCEDURE — 71275 CT ANGIOGRAPHY CHEST: CPT

## 2023-05-22 PROCEDURE — 700117 HCHG RX CONTRAST REV CODE 255: Performed by: PHYSICIAN ASSISTANT

## 2023-05-22 RX ADMIN — IOHEXOL 100 ML: 350 INJECTION, SOLUTION INTRAVENOUS at 13:59

## 2023-05-23 ENCOUNTER — TELEPHONE (OUTPATIENT)
Dept: CARDIOLOGY | Facility: MEDICAL CENTER | Age: 83
End: 2023-05-23
Payer: MEDICARE

## 2023-05-23 NOTE — TELEPHONE ENCOUNTER
Phone Number Called: [phone]    Call outcome: Spoke to patient regarding message below.    Message: Called to inform patient of JA recommendations.     RN attempted to schedule patient, but only able to schedule into September. RN to reach out to scheduling for assistance. Patient reports he is only available Mondays and Tuesdays.     No further questions at this time.     ----- Message from Shyla Flor P.A.-C. sent at 5/23/2023  8:32 AM PDT -----  VR  Testing ordered on your patient while you were out of town in response to his abnormal iliac U/S. 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 I don't think there is anything to address immediately but let me know if you think otherwise.     Joseph can you have this patient schedule follow up with Dr. Samuel? Thank you!

## 2023-06-07 PROCEDURE — RXMED WILLOW AMBULATORY MEDICATION CHARGE: Performed by: INTERNAL MEDICINE

## 2023-06-09 ENCOUNTER — PHARMACY VISIT (OUTPATIENT)
Dept: PHARMACY | Facility: MEDICAL CENTER | Age: 83
End: 2023-06-09
Payer: MEDICARE

## 2023-06-09 PROCEDURE — RXMED WILLOW AMBULATORY MEDICATION CHARGE: Performed by: INTERNAL MEDICINE

## 2023-07-17 ENCOUNTER — HOSPITAL ENCOUNTER (OUTPATIENT)
Dept: LAB | Facility: MEDICAL CENTER | Age: 83
End: 2023-07-17
Attending: UROLOGY
Payer: MEDICARE

## 2023-07-17 LAB
PSA SERPL-MCNC: 2.16 NG/ML (ref 0–4)
TESTOST SERPL-MCNC: 449 NG/DL (ref 175–781)

## 2023-07-17 PROCEDURE — 84403 ASSAY OF TOTAL TESTOSTERONE: CPT

## 2023-07-17 PROCEDURE — 36415 COLL VENOUS BLD VENIPUNCTURE: CPT

## 2023-07-17 PROCEDURE — 84153 ASSAY OF PSA TOTAL: CPT

## 2023-09-12 ENCOUNTER — APPOINTMENT (RX ONLY)
Dept: URBAN - METROPOLITAN AREA CLINIC 20 | Facility: CLINIC | Age: 83
Setting detail: DERMATOLOGY
End: 2023-09-12

## 2023-09-12 DIAGNOSIS — D69.2 OTHER NONTHROMBOCYTOPENIC PURPURA: ICD-10-CM

## 2023-09-12 DIAGNOSIS — L82.0 INFLAMED SEBORRHEIC KERATOSIS: ICD-10-CM

## 2023-09-12 DIAGNOSIS — L81.4 OTHER MELANIN HYPERPIGMENTATION: ICD-10-CM

## 2023-09-12 DIAGNOSIS — L82.1 OTHER SEBORRHEIC KERATOSIS: ICD-10-CM

## 2023-09-12 DIAGNOSIS — D22 MELANOCYTIC NEVI: ICD-10-CM

## 2023-09-12 DIAGNOSIS — D18.0 HEMANGIOMA: ICD-10-CM

## 2023-09-12 DIAGNOSIS — Q82.5 CONGENITAL NON-NEOPLASTIC NEVUS: ICD-10-CM

## 2023-09-12 DIAGNOSIS — Z85.828 PERSONAL HISTORY OF OTHER MALIGNANT NEOPLASM OF SKIN: ICD-10-CM

## 2023-09-12 DIAGNOSIS — L57.8 OTHER SKIN CHANGES DUE TO CHRONIC EXPOSURE TO NONIONIZING RADIATION: ICD-10-CM

## 2023-09-12 DIAGNOSIS — L57.0 ACTINIC KERATOSIS: ICD-10-CM

## 2023-09-12 DIAGNOSIS — Z85.820 PERSONAL HISTORY OF MALIGNANT MELANOMA OF SKIN: ICD-10-CM

## 2023-09-12 PROBLEM — D22.5 MELANOCYTIC NEVI OF TRUNK: Status: ACTIVE | Noted: 2023-09-12

## 2023-09-12 PROBLEM — D18.01 HEMANGIOMA OF SKIN AND SUBCUTANEOUS TISSUE: Status: ACTIVE | Noted: 2023-09-12

## 2023-09-12 PROBLEM — D48.5 NEOPLASM OF UNCERTAIN BEHAVIOR OF SKIN: Status: ACTIVE | Noted: 2023-09-12

## 2023-09-12 PROCEDURE — ? DIAGNOSIS COMMENT

## 2023-09-12 PROCEDURE — ? LIQUID NITROGEN

## 2023-09-12 PROCEDURE — ? BIOPSY BY SHAVE METHOD

## 2023-09-12 PROCEDURE — 11102 TANGNTL BX SKIN SINGLE LES: CPT | Mod: 59

## 2023-09-12 PROCEDURE — 17110 DESTRUCTION B9 LES UP TO 14: CPT

## 2023-09-12 PROCEDURE — 17000 DESTRUCT PREMALG LESION: CPT | Mod: 59

## 2023-09-12 PROCEDURE — 99213 OFFICE O/P EST LOW 20 MIN: CPT | Mod: 25

## 2023-09-12 PROCEDURE — ? COUNSELING

## 2023-09-12 PROCEDURE — 17003 DESTRUCT PREMALG LES 2-14: CPT | Mod: 59

## 2023-09-12 ASSESSMENT — LOCATION SIMPLE DESCRIPTION DERM
LOCATION SIMPLE: RIGHT THIGH
LOCATION SIMPLE: LEFT UPPER BACK
LOCATION SIMPLE: RIGHT UPPER ARM
LOCATION SIMPLE: LEFT THIGH
LOCATION SIMPLE: LEFT CHEEK
LOCATION SIMPLE: RIGHT FOREHEAD
LOCATION SIMPLE: LEFT UPPER ARM
LOCATION SIMPLE: LEFT NOSE
LOCATION SIMPLE: LEFT ZYGOMA
LOCATION SIMPLE: CHEST
LOCATION SIMPLE: SCALP
LOCATION SIMPLE: NOSE
LOCATION SIMPLE: LEFT FOREARM
LOCATION SIMPLE: LEFT FOREHEAD
LOCATION SIMPLE: RIGHT FOREARM
LOCATION SIMPLE: RIGHT UPPER BACK
LOCATION SIMPLE: RIGHT SCALP
LOCATION SIMPLE: RIGHT CHEEK
LOCATION SIMPLE: RIGHT CLAVICULAR SKIN
LOCATION SIMPLE: UPPER BACK

## 2023-09-12 ASSESSMENT — LOCATION ZONE DERM
LOCATION ZONE: TRUNK
LOCATION ZONE: ARM
LOCATION ZONE: SCALP
LOCATION ZONE: NOSE
LOCATION ZONE: LEG
LOCATION ZONE: FACE

## 2023-09-12 ASSESSMENT — LOCATION DETAILED DESCRIPTION DERM
LOCATION DETAILED: RIGHT ANTERIOR PROXIMAL UPPER ARM
LOCATION DETAILED: LEFT MEDIAL SUPERIOR CHEST
LOCATION DETAILED: RIGHT CLAVICULAR SKIN
LOCATION DETAILED: LEFT NASAL ALA
LOCATION DETAILED: LEFT SUPERIOR FOREHEAD
LOCATION DETAILED: RIGHT DISTAL DORSAL FOREARM
LOCATION DETAILED: INFERIOR THORACIC SPINE
LOCATION DETAILED: RIGHT MID-UPPER BACK
LOCATION DETAILED: RIGHT INFERIOR UPPER BACK
LOCATION DETAILED: RIGHT ANTERIOR DISTAL THIGH
LOCATION DETAILED: LEFT ANTERIOR PROXIMAL UPPER ARM
LOCATION DETAILED: NASAL TIP
LOCATION DETAILED: LEFT MEDIAL ZYGOMA
LOCATION DETAILED: LEFT INFERIOR LATERAL UPPER BACK
LOCATION DETAILED: LEFT FOREHEAD
LOCATION DETAILED: RIGHT PROXIMAL DORSAL FOREARM
LOCATION DETAILED: RIGHT MEDIAL FOREHEAD
LOCATION DETAILED: RIGHT CENTRAL FRONTAL SCALP
LOCATION DETAILED: LEFT INFERIOR CENTRAL MALAR CHEEK
LOCATION DETAILED: RIGHT SUPERIOR POSTAURICULAR SKIN
LOCATION DETAILED: LEFT DISTAL DORSAL FOREARM
LOCATION DETAILED: RIGHT SUPERIOR MEDIAL UPPER BACK
LOCATION DETAILED: RIGHT SUPERIOR FOREHEAD
LOCATION DETAILED: RIGHT CENTRAL MALAR CHEEK
LOCATION DETAILED: LEFT ANTERIOR DISTAL THIGH

## 2023-09-12 NOTE — PROCEDURE: BIOPSY BY SHAVE METHOD
Detail Level: Detailed
Depth Of Biopsy: dermis
Was A Bandage Applied: Yes
Size Of Lesion In Cm: 0.3
X Size Of Lesion In Cm: 0
Biopsy Type: H and E
Biopsy Method: Personna blade
Anesthesia Type: 1% lidocaine with 1:100,000 epinephrine and a 1:6 solution of 8.4% sodium bicarbonate
Anesthesia Volume In Cc: 0.2
Hemostasis: Drysol and Electrocautery
Wound Care: Aquaphor
Dressing: Band-Aid
Destruction After The Procedure: No
Type Of Destruction Used: Curettage
Curettage Text: The wound bed was treated with curettage after the biopsy was performed.
Cryotherapy Text: The wound bed was treated with cryotherapy after the biopsy was performed.
Electrodesiccation Text: The wound bed was treated with electrodesiccation after the biopsy was performed.
Electrodesiccation And Curettage Text: The wound bed was treated with electrodesiccation and curettage after the biopsy was performed.
Silver Nitrate Text: The wound bed was treated with silver nitrate after the biopsy was performed.
Lab: [id number]
Lab Facility: 
Consent: Written consent was obtained and risks were reviewed including but not limited to scarring, infection, bleeding, scabbing, incomplete removal, nerve damage and allergy to anesthesia.
Post-Care Instructions: I reviewed with the patient in detail post-care instructions. Patient is to keep the biopsy site dry overnight, and then apply bacitracin twice daily until healed. Patient may apply hydrogen peroxide soaks to remove any crusting.
Notification Instructions: Patient will be notified of biopsy results. However, patient instructed to call the office if not contacted within 2 weeks.
Billing Type: Third-Party Bill
Information: Selecting Yes will display possible errors in your note based on the variables you have selected. This validation is only offered as a suggestion for you. PLEASE NOTE THAT THE VALIDATION TEXT WILL BE REMOVED WHEN YOU FINALIZE YOUR NOTE. IF YOU WANT TO FAX A PRELIMINARY NOTE YOU WILL NEED TO TOGGLE THIS TO 'NO' IF YOU DO NOT WANT IT IN YOUR FAXED NOTE.

## 2023-09-12 NOTE — PROCEDURE: LIQUID NITROGEN
Medical Necessity Information: It is in your best interest to select a reason for this procedure from the list below. All of these items fulfill various CMS LCD requirements except the new and changing color options.
Consent: The patient's consent was obtained including but not limited to risks of crusting, scabbing, blistering, scarring, darker or lighter pigmentary change, recurrence, incomplete removal and infection.
Show Topical Anesthesia Variable?: Yes
Medical Necessity Clause: This procedure was medically necessary because the lesions that were treated were:
Post-Care Instructions: I reviewed with the patient in detail post-care instructions. Patient is to wear sunprotection, and avoid picking at any of the treated lesions. Pt may apply Vaseline to crusted or scabbing areas.
Render Post-Care Instructions In Note?: no
Spray Paint Text: The liquid nitrogen was applied to the skin utilizing a spray paint frosting technique.
Detail Level: Detailed
Duration Of Freeze Thaw-Cycle (Seconds): 3

## 2023-09-21 ENCOUNTER — PHARMACY VISIT (OUTPATIENT)
Dept: PHARMACY | Facility: MEDICAL CENTER | Age: 83
End: 2023-09-21
Payer: COMMERCIAL

## 2023-09-21 PROCEDURE — RXMED WILLOW AMBULATORY MEDICATION CHARGE: Performed by: INTERNAL MEDICINE

## 2023-09-22 DIAGNOSIS — Z79.899 LONG TERM CURRENT USE OF DIURETIC: ICD-10-CM

## 2023-09-22 DIAGNOSIS — E78.49 OTHER HYPERLIPIDEMIA: ICD-10-CM

## 2023-09-22 RX ORDER — CHLORTHALIDONE 25 MG/1
25 TABLET ORAL DAILY
Qty: 100 TABLET | Refills: 0 | Status: SHIPPED | OUTPATIENT
Start: 2023-09-22 | End: 2024-01-15

## 2023-09-22 NOTE — TELEPHONE ENCOUNTER
Requested Prescriptions     Pending Prescriptions Disp Refills    chlorthalidone (HYGROTON) 25 MG Tab [Pharmacy Med Name: CHLORTHALIDONE 25 MG TABLET] 90 Tablet 1     Sig: TAKE 1 TABLET BY MOUTH EVERY DAY       Was the patient seen in the last year in this department? Yes    Does the patient have assisted Plus and need 100 day supply (blood pressure, diabetes and cholesterol meds only)? Yes, quantity updated to 100 days    Is the patient due for lab work? yes

## 2023-09-27 PROCEDURE — RXMED WILLOW AMBULATORY MEDICATION CHARGE: Performed by: INTERNAL MEDICINE

## 2023-09-28 RX ORDER — TADALAFIL 20 MG/1
20 TABLET ORAL PRN
Qty: 10 TABLET | Refills: 3 | Status: SHIPPED | OUTPATIENT
Start: 2023-09-28 | End: 2024-03-22

## 2023-10-02 ENCOUNTER — PHARMACY VISIT (OUTPATIENT)
Dept: PHARMACY | Facility: MEDICAL CENTER | Age: 83
End: 2023-10-02
Payer: COMMERCIAL

## 2023-10-23 ENCOUNTER — APPOINTMENT (OUTPATIENT)
Dept: CARDIOLOGY | Facility: MEDICAL CENTER | Age: 83
End: 2023-10-23
Attending: INTERNAL MEDICINE
Payer: MEDICARE

## 2023-12-20 DIAGNOSIS — I10 ESSENTIAL HYPERTENSION: ICD-10-CM

## 2023-12-20 NOTE — TELEPHONE ENCOUNTER
Is the patient due for a refill? Yes    Was the patient seen the past year? Yes    Date of last office visit: 02/21/2022    Does the patient have an upcoming appointment?  Yes   If yes, When? 01/29/2024    Provider to refill:VR    Does the patients insurance require a 100 day supply?  Yes

## 2023-12-21 PROCEDURE — RXMED WILLOW AMBULATORY MEDICATION CHARGE: Performed by: INTERNAL MEDICINE

## 2023-12-21 RX ORDER — LISINOPRIL 5 MG/1
5 TABLET ORAL DAILY
Qty: 100 TABLET | Refills: 0 | Status: SHIPPED | OUTPATIENT
Start: 2023-12-21

## 2023-12-21 RX ORDER — OMEPRAZOLE 20 MG/1
20 CAPSULE, DELAYED RELEASE ORAL
Qty: 100 CAPSULE | Refills: 2 | Status: SHIPPED | OUTPATIENT
Start: 2023-12-21

## 2023-12-22 ENCOUNTER — PHARMACY VISIT (OUTPATIENT)
Dept: PHARMACY | Facility: MEDICAL CENTER | Age: 83
End: 2023-12-22
Payer: COMMERCIAL

## 2023-12-30 ENCOUNTER — PATIENT MESSAGE (OUTPATIENT)
Dept: CARDIOLOGY | Facility: MEDICAL CENTER | Age: 83
End: 2023-12-30
Payer: MEDICARE

## 2023-12-30 DIAGNOSIS — I10 ESSENTIAL HYPERTENSION: ICD-10-CM

## 2024-01-02 RX ORDER — CARVEDILOL 25 MG/1
TABLET ORAL
Qty: 100 TABLET | Refills: 0 | Status: SHIPPED | OUTPATIENT
Start: 2024-01-02

## 2024-01-02 RX ORDER — CARVEDILOL 25 MG/1
TABLET ORAL
Qty: 100 TABLET | Refills: 0 | Status: SHIPPED | OUTPATIENT
Start: 2024-01-02 | End: 2024-01-02

## 2024-01-15 DIAGNOSIS — E78.49 OTHER HYPERLIPIDEMIA: ICD-10-CM

## 2024-01-15 RX ORDER — CHLORTHALIDONE 25 MG/1
25 TABLET ORAL DAILY
Qty: 100 TABLET | Refills: 0 | Status: SHIPPED | OUTPATIENT
Start: 2024-01-15

## 2024-01-26 ENCOUNTER — HOSPITAL ENCOUNTER (OUTPATIENT)
Dept: LAB | Facility: MEDICAL CENTER | Age: 84
End: 2024-01-26
Attending: INTERNAL MEDICINE
Payer: MEDICARE

## 2024-01-26 DIAGNOSIS — Z79.899 LONG TERM CURRENT USE OF DIURETIC: ICD-10-CM

## 2024-01-26 LAB — MAGNESIUM SERPL-MCNC: 2.1 MG/DL (ref 1.5–2.5)

## 2024-01-26 PROCEDURE — 36415 COLL VENOUS BLD VENIPUNCTURE: CPT

## 2024-01-26 PROCEDURE — 83735 ASSAY OF MAGNESIUM: CPT

## 2024-01-29 ENCOUNTER — OFFICE VISIT (OUTPATIENT)
Dept: CARDIOLOGY | Facility: MEDICAL CENTER | Age: 84
End: 2024-01-29
Attending: INTERNAL MEDICINE
Payer: MEDICARE

## 2024-01-29 VITALS
DIASTOLIC BLOOD PRESSURE: 72 MMHG | WEIGHT: 173 LBS | HEIGHT: 67 IN | HEART RATE: 52 BPM | SYSTOLIC BLOOD PRESSURE: 128 MMHG | BODY MASS INDEX: 27.15 KG/M2 | OXYGEN SATURATION: 95 %

## 2024-01-29 DIAGNOSIS — I10 ESSENTIAL HYPERTENSION: ICD-10-CM

## 2024-01-29 DIAGNOSIS — I73.9 PAD (PERIPHERAL ARTERY DISEASE) (HCC): ICD-10-CM

## 2024-01-29 DIAGNOSIS — R93.1 AGATSTON CORONARY ARTERY CALCIUM SCORE GREATER THAN 400: ICD-10-CM

## 2024-01-29 DIAGNOSIS — I71.40 ABDOMINAL AORTIC ANEURYSM (AAA), UNSPECIFIED PART, UNSPECIFIED WHETHER RUPTURED (HCC): ICD-10-CM

## 2024-01-29 DIAGNOSIS — I70.0 AORTIC ATHEROSCLEROSIS (HCC): ICD-10-CM

## 2024-01-29 DIAGNOSIS — E78.49 OTHER HYPERLIPIDEMIA: ICD-10-CM

## 2024-01-29 PROCEDURE — 99214 OFFICE O/P EST MOD 30 MIN: CPT | Performed by: INTERNAL MEDICINE

## 2024-01-29 PROCEDURE — 3074F SYST BP LT 130 MM HG: CPT | Performed by: INTERNAL MEDICINE

## 2024-01-29 PROCEDURE — 3078F DIAST BP <80 MM HG: CPT | Performed by: INTERNAL MEDICINE

## 2024-01-29 PROCEDURE — 99213 OFFICE O/P EST LOW 20 MIN: CPT | Performed by: INTERNAL MEDICINE

## 2024-01-29 RX ORDER — MULTIVIT WITH MINERALS/LUTEIN
1000 TABLET ORAL
COMMUNITY

## 2024-01-29 ASSESSMENT — ENCOUNTER SYMPTOMS
DECREASED APPETITE: 0
ALTERED MENTAL STATUS: 0
CLAUDICATION: 0
DEPRESSION: 0
WEIGHT GAIN: 0
WEIGHT LOSS: 0
DYSPNEA ON EXERTION: 0
PND: 0
IRREGULAR HEARTBEAT: 0
VOMITING: 0
DIARRHEA: 0
HEARTBURN: 0
DIZZINESS: 0
NAUSEA: 0
FEVER: 0
CONSTIPATION: 0
BACK PAIN: 0
SHORTNESS OF BREATH: 0
FLANK PAIN: 0
SYNCOPE: 0
BLURRED VISION: 0
ORTHOPNEA: 0
PALPITATIONS: 0
NEAR-SYNCOPE: 0
ABDOMINAL PAIN: 0
COUGH: 0

## 2024-01-29 NOTE — PROGRESS NOTES
Cardiology Note    hypertension    History of Present Illness: Morris Deng is a 83 y.o. male PMH CAC on CT, HTN, HLD, DM2, small infrarenal aortic aneurysm who presents for follow up.    Doing well. No cardiac complaints. Compliant with medications and denies adverse effects. Hasn't checked blood pressure at home but he will start keeping track.     Review of Systems   Constitutional: Negative for decreased appetite, fever, malaise/fatigue, weight gain and weight loss.   HENT:  Negative for congestion and nosebleeds.    Eyes:  Negative for blurred vision.   Cardiovascular:  Negative for chest pain, claudication, dyspnea on exertion, irregular heartbeat, leg swelling, near-syncope, orthopnea, palpitations, paroxysmal nocturnal dyspnea and syncope.   Respiratory:  Negative for cough and shortness of breath.    Endocrine: Negative for cold intolerance and heat intolerance.   Skin:  Negative for rash.   Musculoskeletal:  Negative for back pain.   Gastrointestinal:  Negative for abdominal pain, constipation, diarrhea, heartburn, melena, nausea and vomiting.   Genitourinary:  Negative for dysuria, flank pain and hematuria.   Neurological:  Negative for dizziness.   Psychiatric/Behavioral:  Negative for altered mental status and depression.          Past Medical History:   Diagnosis Date    Cancer (HCC)     colon    Cough with exposure to COVID-19 virus 6/23/2022    Dry mouth 2/8/2022    H/O colon cancer, stage I 2/2/2021    Hypertension     Neuropathy 2/2/2021    Onychomycosis 6/7/2022    Other hemorrhoids 2/2/2021    Type 2 diabetes mellitus with circulatory disorder, without long-term current use of insulin (HCC) 8/3/2021    Vitamin D deficiency 8/3/2021         Past Surgical History:   Procedure Laterality Date    OTHER ABDOMINAL SURGERY      colon resection         Current Outpatient Medications   Medication Sig Dispense Refill    Magnesium Gluconate (MAGNESIUM 27 PO) Take  by mouth. 1 tab every day       Ascorbic Acid (VITAMIN C) 1000 MG Tab Take 1,000 mg by mouth. 2 tabs everyday      chlorthalidone (HYGROTON) 25 MG Tab TAKE 1 TABLET BY MOUTH EVERY  Tablet 0    carvedilol (COREG) 25 MG Tab TAKE 1 TABLET BY MOUTH TWICE DAILY WITH FOOD 100 Tablet 0    lisinopril (PRINIVIL) 5 MG Tab Take 1 Tablet by mouth every day. 100 Tablet 0    omeprazole (PRILOSEC) 20 MG delayed-release capsule TAKE 1 CAPSULE BY MOUTH EVERY  Capsule 2    ezetimibe (ZETIA) 10 MG Tab TAKE 1 TABLET BY MOUTH EVERY  Tablet 8    tadalafil (CIALIS) 20 MG tablet Take 1 tablet by mouth as needed for Erectile Dysfunction. 10 Tablet 3    tadalafil (CIALIS) 20 MG tablet Take 1 tablet by mouth as needed for Erectile Dysfunction. 10 Tablet 3    metFORMIN ER (GLUCOPHAGE XR) 500 MG TABLET SR 24 HR TAKE 1 TABLET BY MOUTH ONE TIME DAILY 100 Tablet 2    clopidogrel (PLAVIX) 75 MG Tab TAKE 1 TABLET BY MOUTH EVERY  Tablet 2    Cholecalciferol (VITAMIN D-3 PO) Take  by mouth.      atorvastatin (LIPITOR) 80 MG tablet TAKE 1 TABLET BY MOUTH ONE TIME DAILY 100 Tablet 3    tadalafil (CIALIS) 10 MG tablet Take 1 tablet by mouth 1 time a day as needed for Erectile Dysfunction. 10 Tablet 3    ketoconazole (NIZORAL) 2 % Cream Apply  topically every day.      betamethasone dipropionate (DIPROLENE) 0.05 % Ointment Apply to affected area twice daily as needed 50 g 2    diphenhydrAMINE (BENADRYL) 25 MG Tab Take 1 Each by mouth every 8 hours. 10 Tab 0    EPINEPHrine (EPIPEN) 0.3 MG/0.3ML SOAJ solution for injection 1 Syringe by Injection route Once PRN (for severe allergic reaction) for up to 1 dose. 1 Each 1     No current facility-administered medications for this visit.         Allergies   Allergen Reactions    Peanut-Derived Anaphylaxis    Food      Cereals: Barley, Buckwheat , corn, oat, wheat   Meats: turkey  Fruits: apple, cantaloupe/ muskmelon, grape white , lemon, orange, peach, pear, watermelon.  Vegetables: Bean, green; bean, lima; cabbage,  "carrot,celery, lettuce, pea, pepper, green; soybean  Seafood: lobster , Oyster, shrimp  Nuts/ seeds: [place], Cashew, coconut, Filbert/Hazelnut,Peanut, Pecan, Pistachio nut,Sesame seed/oil, walnut, flaxseed  Spices/other: Chocolate/ cacao bean, cumin, dill, garlic, mustard,pepper,Histamine control      Iodine          History reviewed. No pertinent family history.      Social History     Socioeconomic History    Marital status:      Spouse name: Not on file    Number of children: Not on file    Years of education: Not on file    Highest education level: Not on file   Occupational History    Not on file   Tobacco Use    Smoking status: Former    Smokeless tobacco: Never    Tobacco comments:     quit 23 yrs ago   Vaping Use    Vaping Use: Never used   Substance and Sexual Activity    Alcohol use: Yes     Alcohol/week: 4.2 oz     Types: 7 Standard drinks or equivalent per week     Comment: daily cocktail (vodka rocks) martini every night    Drug use: No    Sexual activity: Not Currently   Other Topics Concern    Not on file   Social History Narrative    Not on file     Social Determinants of Health     Financial Resource Strain: Not on file   Food Insecurity: Not on file   Transportation Needs: Not on file   Physical Activity: Not on file   Stress: Not on file   Social Connections: Not on file   Intimate Partner Violence: Not on file   Housing Stability: Not on file         Physical Exam:  Ambulatory Vitals  /72 (BP Location: Left arm, Patient Position: Sitting, BP Cuff Size: Adult)   Pulse (!) 52   Ht 1.689 m (5' 6.5\")   Wt 78.5 kg (173 lb)   SpO2 95%    BP Readings from Last 4 Encounters:   01/29/24 128/72   05/08/23 (!) 148/80   02/21/23 (!) 150/90   06/23/22 (!) 150/78     Weight/BMI:   Vitals:    01/29/24 1055   BP: 128/72   Weight: 78.5 kg (173 lb)   Height: 1.689 m (5' 6.5\")      Body mass index is 27.5 kg/m².  Wt Readings from Last 4 Encounters:   01/29/24 78.5 kg (173 lb)   05/08/23 75.9 kg " (167 lb 6.4 oz)   02/21/23 76.2 kg (168 lb)   06/23/22 77.1 kg (170 lb)       Physical Exam  Constitutional:       General: He is not in acute distress.  HENT:      Head: Normocephalic and atraumatic.   Eyes:      Conjunctiva/sclera: Conjunctivae normal.      Pupils: Pupils are equal, round, and reactive to light.   Neck:      Vascular: No JVD.   Cardiovascular:      Rate and Rhythm: Normal rate and regular rhythm.      Heart sounds: Normal heart sounds. No murmur heard.     No friction rub. No gallop.   Pulmonary:      Effort: Pulmonary effort is normal. No respiratory distress.      Breath sounds: Normal breath sounds. No wheezing or rales.   Chest:      Chest wall: No tenderness.   Abdominal:      General: Bowel sounds are normal. There is no distension.      Palpations: Abdomen is soft.   Musculoskeletal:      Cervical back: Normal range of motion and neck supple.   Skin:     General: Skin is warm and dry.   Neurological:      Mental Status: He is alert and oriented to person, place, and time.   Psychiatric:         Mood and Affect: Affect normal.         Judgment: Judgment normal.           Lab Data Review:  Lab Results   Component Value Date/Time    CHOLSTRLTOT 134 02/16/2023 09:52 AM    LDL 57 02/16/2023 09:52 AM    HDL 64 02/16/2023 09:52 AM    TRIGLYCERIDE 65 02/16/2023 09:52 AM       Lab Results   Component Value Date/Time    SODIUM 144 05/18/2023 10:13 AM    POTASSIUM 4.4 05/18/2023 10:13 AM    CHLORIDE 106 05/18/2023 10:13 AM    CO2 26 05/18/2023 10:13 AM    GLUCOSE 100 (H) 05/18/2023 10:13 AM    BUN 19 05/18/2023 10:13 AM    CREATININE 1.01 05/18/2023 10:13 AM     CrCl cannot be calculated (Patient's most recent lab result is older than the maximum 7 days allowed.).  Lab Results   Component Value Date/Time    ALKPHOSPHAT 64 02/16/2023 09:52 AM    ASTSGOT 25 02/16/2023 09:52 AM    ALTSGPT 23 02/16/2023 09:52 AM    TBILIRUBIN 1.2 02/16/2023 09:52 AM      Lab Results   Component Value Date/Time    WBC 5.6  "05/17/2021 12:48 PM     Lab Results   Component Value Date/Time    HBA1C 6.3 (A) 06/07/2022 11:40 AM     No components found for: \"TROP\"      Cardiac Imaging and Procedures Review:      EKG 11/2019 sinus, first deg AVB    Calcium score - 1592    Nuclear cindy SPECT 3/20/20  NUCLEAR IMAGING INTERPRETATION   Normal myocardial perfusion with no ischemia.   Normal left ventricular wall motion.  LV ejection fraction = 69%.   ECG INTERPRETATION   Negative stress ECG for ischemia.    TTE 11/2019  CONCLUSIONS  No prior study is available for comparison.   Left ventricular ejection fraction is visually estimated to be greater   than 75%.  Hyperdynamic left ventricular systolic function.  Normal diastolic function.  The right ventricle was normal in size and function.  No significant valve disease or flow abnormalities.   Left Ventricle  Normal left ventricular chamber size. Mild concentric left ventricular   hypertrophy. Hyperdynamic left ventricular systolic function. Left   ventricular ejection fraction is visually estimated to be greater than   75%. Normal regional wall motion. Normal diastolic function.    Abdominal ultrasound 6/2020  Ectasia/small aneurysm of the infrarenal abdominal aorta measuring 2.8 x 2.3 cm    CTA aorta 5/2023  IMPRESSION:  1.  Ectatic ascending aorta measuring 3.8 cm.  2.  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 Mild focal dilatation measuring up to 2.7 cm.  3.  Atherosclerotic changes.  4.  Hepatic and splenic cysts.  5.  No hydronephrosis.  6.  Large hiatal hernia.  7.  Colon diverticula without evidence diverticulitis. No free fluid.    Medical Decision Making:  Problem List Items Addressed This Visit       Hyperlipidemia    Essential hypertension    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    Aortic atherosclerosis (HCC)    Abdominal aortic aneurysm (AAA) (HCC)    Relevant Orders    US-AORTA/ILIACS DUPLEX COMPLETE    PAD (peripheral artery disease) (HCC)       HTN / " small infrarenal aortic aneurysm - goal <130/80. Controlled. Continue regimen. Serial imaging.    CAD on CT - continue statin and zetia. Annual lipids. Continue plavix (allergic to aspirin).     It was my pleasure to meet with Mr. Deng.    A total of 56 minutes of time was spent on day of encounter reviewing medical record, performing history and examination, counseling, ordering medication/test/consults and documentation.

## 2024-01-31 ENCOUNTER — APPOINTMENT (OUTPATIENT)
Dept: MEDICAL GROUP | Facility: PHYSICIAN GROUP | Age: 84
End: 2024-01-31
Attending: FAMILY MEDICINE
Payer: MEDICARE

## 2024-01-31 NOTE — ASSESSMENT & PLAN NOTE
Chronic, stable. BP today x/x . Pt does/does not monitor BP at home. Denies dizziness/lightheadedness. Continue current treatment regime: chlorthalidone 25mg daily, lisinopril 5mg daily, and caredilol 25mg daily. Follow up with PCP annually for continued monitoring and management.

## 2024-01-31 NOTE — ASSESSMENT & PLAN NOTE
Chronic, stable. Diagnosis made following incidental finding of disease on imaging. Associated with HLD. Continue atorvastatin 80mg. Encouraged Mediterranean diet and regular physical exercise. Follow up with PCP at least annually for continued monitoring.

## 2024-01-31 NOTE — ASSESSMENT & PLAN NOTE
Chronic, stable. Last A1c per chart review 6/2022 at 6.3. Last foot exam 6/2022 normal, last microalb/creat 1/2022 normal, last retinal screening???. Continue current treatment regime: metformin SR 500mg daily. Follow up with PCP at least annually for continued monitoring and management.

## 2024-03-12 ENCOUNTER — APPOINTMENT (RX ONLY)
Dept: URBAN - METROPOLITAN AREA CLINIC 20 | Facility: CLINIC | Age: 84
Setting detail: DERMATOLOGY
End: 2024-03-12

## 2024-03-12 DIAGNOSIS — Z85.820 PERSONAL HISTORY OF MALIGNANT MELANOMA OF SKIN: ICD-10-CM

## 2024-03-12 DIAGNOSIS — D22 MELANOCYTIC NEVI: ICD-10-CM

## 2024-03-12 DIAGNOSIS — D69.2 OTHER NONTHROMBOCYTOPENIC PURPURA: ICD-10-CM

## 2024-03-12 DIAGNOSIS — L82.0 INFLAMED SEBORRHEIC KERATOSIS: ICD-10-CM

## 2024-03-12 DIAGNOSIS — Q82.5 CONGENITAL NON-NEOPLASTIC NEVUS: ICD-10-CM

## 2024-03-12 DIAGNOSIS — D18.0 HEMANGIOMA: ICD-10-CM

## 2024-03-12 DIAGNOSIS — L57.8 OTHER SKIN CHANGES DUE TO CHRONIC EXPOSURE TO NONIONIZING RADIATION: ICD-10-CM

## 2024-03-12 DIAGNOSIS — Z85.828 PERSONAL HISTORY OF OTHER MALIGNANT NEOPLASM OF SKIN: ICD-10-CM

## 2024-03-12 DIAGNOSIS — L81.4 OTHER MELANIN HYPERPIGMENTATION: ICD-10-CM

## 2024-03-12 DIAGNOSIS — L82.1 OTHER SEBORRHEIC KERATOSIS: ICD-10-CM

## 2024-03-12 PROBLEM — D22.5 MELANOCYTIC NEVI OF TRUNK: Status: ACTIVE | Noted: 2024-03-12

## 2024-03-12 PROBLEM — D18.01 HEMANGIOMA OF SKIN AND SUBCUTANEOUS TISSUE: Status: ACTIVE | Noted: 2024-03-12

## 2024-03-12 PROCEDURE — ? DIAGNOSIS COMMENT

## 2024-03-12 PROCEDURE — ? COUNSELING

## 2024-03-12 PROCEDURE — 17110 DESTRUCTION B9 LES UP TO 14: CPT

## 2024-03-12 PROCEDURE — ? LIQUID NITROGEN

## 2024-03-12 PROCEDURE — 99213 OFFICE O/P EST LOW 20 MIN: CPT | Mod: 25

## 2024-03-12 ASSESSMENT — LOCATION DETAILED DESCRIPTION DERM
LOCATION DETAILED: RIGHT SUPERIOR MEDIAL UPPER BACK
LOCATION DETAILED: RIGHT ANTERIOR PROXIMAL UPPER ARM
LOCATION DETAILED: RIGHT CENTRAL POSTAURICULAR SKIN
LOCATION DETAILED: LEFT FOREHEAD
LOCATION DETAILED: RIGHT DISTAL DORSAL FOREARM
LOCATION DETAILED: RIGHT INFERIOR UPPER BACK
LOCATION DETAILED: LEFT ANTERIOR DISTAL THIGH
LOCATION DETAILED: NASAL TIP
LOCATION DETAILED: LEFT INFERIOR CENTRAL MALAR CHEEK
LOCATION DETAILED: LEFT DISTAL DORSAL FOREARM
LOCATION DETAILED: LEFT SUPERIOR FOREHEAD
LOCATION DETAILED: LEFT MEDIAL SUPERIOR CHEST
LOCATION DETAILED: LEFT ANTERIOR PROXIMAL UPPER ARM
LOCATION DETAILED: RIGHT ANTERIOR DISTAL THIGH
LOCATION DETAILED: RIGHT CLAVICULAR SKIN
LOCATION DETAILED: RIGHT CENTRAL MALAR CHEEK
LOCATION DETAILED: RIGHT MID-UPPER BACK
LOCATION DETAILED: RIGHT PROXIMAL DORSAL FOREARM
LOCATION DETAILED: LEFT CENTRAL POSTAURICULAR SKIN
LOCATION DETAILED: INFERIOR THORACIC SPINE
LOCATION DETAILED: RIGHT SUPERIOR POSTAURICULAR SKIN

## 2024-03-12 ASSESSMENT — LOCATION SIMPLE DESCRIPTION DERM
LOCATION SIMPLE: UPPER BACK
LOCATION SIMPLE: RIGHT UPPER BACK
LOCATION SIMPLE: RIGHT THIGH
LOCATION SIMPLE: LEFT POSTAURICULAR SKIN
LOCATION SIMPLE: NOSE
LOCATION SIMPLE: LEFT THIGH
LOCATION SIMPLE: SCALP
LOCATION SIMPLE: LEFT FOREARM
LOCATION SIMPLE: RIGHT UPPER ARM
LOCATION SIMPLE: RIGHT CLAVICULAR SKIN
LOCATION SIMPLE: LEFT UPPER ARM
LOCATION SIMPLE: LEFT FOREHEAD
LOCATION SIMPLE: RIGHT CHEEK
LOCATION SIMPLE: RIGHT FOREARM
LOCATION SIMPLE: CHEST
LOCATION SIMPLE: LEFT CHEEK

## 2024-03-12 ASSESSMENT — LOCATION ZONE DERM
LOCATION ZONE: NOSE
LOCATION ZONE: SCALP
LOCATION ZONE: FACE
LOCATION ZONE: TRUNK
LOCATION ZONE: ARM
LOCATION ZONE: LEG

## 2024-03-12 NOTE — PROCEDURE: LIQUID NITROGEN
Spray Paint Text: The liquid nitrogen was applied to the skin utilizing a spray paint frosting technique.
Show Applicator Variable?: Yes
Post-Care Instructions: I reviewed with the patient in detail post-care instructions. Patient is to wear sunprotection, and avoid picking at any of the treated lesions. Pt may apply Vaseline to crusted or scabbing areas.
Detail Level: Detailed
Consent: The patient's consent was obtained including but not limited to risks of crusting, scabbing, blistering, scarring, darker or lighter pigmentary change, recurrence, incomplete removal and infection.
Include Z78.9 (Other Specified Conditions Influencing Health Status) As An Associated Diagnosis?: No
Medical Necessity Clause: This procedure was medically necessary because the lesions that were treated were:
Medical Necessity Information: It is in your best interest to select a reason for this procedure from the list below. All of these items fulfill various CMS LCD requirements except the new and changing color options.

## 2024-03-19 PROBLEM — Z20.822 COUGH WITH EXPOSURE TO COVID-19 VIRUS: Status: RESOLVED | Noted: 2022-06-23 | Resolved: 2024-03-19

## 2024-03-19 PROBLEM — R05.8 COUGH WITH EXPOSURE TO COVID-19 VIRUS: Status: RESOLVED | Noted: 2022-06-23 | Resolved: 2024-03-19

## 2024-03-21 ENCOUNTER — DOCUMENTATION (OUTPATIENT)
Dept: HEALTH INFORMATION MANAGEMENT | Facility: OTHER | Age: 84
End: 2024-03-21
Payer: MEDICARE

## 2024-03-21 ASSESSMENT — ENCOUNTER SYMPTOMS: GENERAL WELL-BEING: GOOD

## 2024-03-21 ASSESSMENT — ACTIVITIES OF DAILY LIVING (ADL): BATHING_REQUIRES_ASSISTANCE: 0

## 2024-03-21 ASSESSMENT — PATIENT HEALTH QUESTIONNAIRE - PHQ9
2. FEELING DOWN, DEPRESSED, IRRITABLE, OR HOPELESS: NOT AT ALL
1. LITTLE INTEREST OR PLEASURE IN DOING THINGS: NOT AT ALL
CLINICAL INTERPRETATION OF PHQ2 SCORE: 0

## 2024-03-21 NOTE — ASSESSMENT & PLAN NOTE
Chronic, stable. BP today 124/74. Pt expresses concern that his at home BP cuff may not be accurate as he gets readings of systolic BP>170. Encouraged him to bring cuff to clinic for comparison. Denies dizziness/lightheadedness, chest pain, dyspnea. Continue current treatment regime: chlorthalidone 25mg daily, carvedilol 25mg daily and lisinopril 5mg daily. Follow up with PCP annually for continued monitoring and management.

## 2024-03-21 NOTE — ASSESSMENT & PLAN NOTE
Chronic, stable. Maintains on statin therapy without issue. Most recent lipid panel from Feb 2023 WNL. Continue atorvastatin 80mg daily and ezetimibe 10mg daily with clopidogrel 75mg daily. Recommend Mediterranean diet and regular physical activity. Follow up with PCP at least annually for continued monitoring and management.   Lab Results   Component Value Date/Time    CHOLSTRLTOT 134 02/16/2023 09:52 AM    LDL 57 02/16/2023 09:52 AM    HDL 64 02/16/2023 09:52 AM    TRIGLYCERIDE 65 02/16/2023 09:52 AM

## 2024-03-21 NOTE — ASSESSMENT & PLAN NOTE
"Chronic, stable. Diagnosis made following incidental finding of disease on imaging. Associated with HLD. Continue atorvastatin 80g daily. Encouraged Mediterranean diet and regular physical exercise. Follow up with PCP at least annually for continued monitoring.     Ultrasound aorta iliac 5/2/2023:  \"Diffuse atherosclerotic plaque within the abdominal aorta and bilateral iliac    arteries. \"  "

## 2024-03-21 NOTE — ASSESSMENT & PLAN NOTE
Chronic, ongoing issue, A1c 6.2 today. Last A1c 6.3 as of June 2022. Last monofilament foot exam: sees podiatrist Dr. Hobbs, last urine microalb/creat: 1/2022, last retinal screening: requesting records. Maintains on metformin 500mg daily. Attempted urine collection today. Continue to advise low carbohydrate diet with regular physical activity. Continue current treatment regime. Follow up with PCP as scheduled for continued monitoring and management.     Latest Reference Range & Units 05/17/21 12:48 02/14/22 11:14 06/07/22 11:40   Glycohemoglobin 0.0 - 5.6 % 6.7 (H) 6.5 (H) 6.3 !   (H): Data is abnormally high  !: Data is abnormal

## 2024-03-22 ENCOUNTER — OFFICE VISIT (OUTPATIENT)
Dept: FAMILY PLANNING/WOMEN'S HEALTH CLINIC | Facility: PHYSICIAN GROUP | Age: 84
End: 2024-03-22
Attending: FAMILY MEDICINE
Payer: MEDICARE

## 2024-03-22 ENCOUNTER — HOSPITAL ENCOUNTER (OUTPATIENT)
Facility: MEDICAL CENTER | Age: 84
End: 2024-03-22
Attending: PHYSICIAN ASSISTANT
Payer: MEDICARE

## 2024-03-22 VITALS
HEIGHT: 66 IN | SYSTOLIC BLOOD PRESSURE: 124 MMHG | WEIGHT: 167.7 LBS | DIASTOLIC BLOOD PRESSURE: 74 MMHG | BODY MASS INDEX: 26.95 KG/M2

## 2024-03-22 DIAGNOSIS — I77.89 ECTASIA OF ARTERY (HCC): ICD-10-CM

## 2024-03-22 DIAGNOSIS — E11.59 TYPE 2 DIABETES MELLITUS WITH OTHER CIRCULATORY COMPLICATION, WITHOUT LONG-TERM CURRENT USE OF INSULIN (HCC): ICD-10-CM

## 2024-03-22 DIAGNOSIS — I70.0 AORTIC ATHEROSCLEROSIS (HCC): ICD-10-CM

## 2024-03-22 DIAGNOSIS — I10 ESSENTIAL HYPERTENSION: ICD-10-CM

## 2024-03-22 DIAGNOSIS — E78.49 OTHER HYPERLIPIDEMIA: ICD-10-CM

## 2024-03-22 DIAGNOSIS — I71.40 ABDOMINAL AORTIC ANEURYSM (AAA), UNSPECIFIED PART, UNSPECIFIED WHETHER RUPTURED (HCC): ICD-10-CM

## 2024-03-22 DIAGNOSIS — G62.0 DRUG-INDUCED POLYNEUROPATHY (HCC): ICD-10-CM

## 2024-03-22 PROBLEM — R94.30: Status: ACTIVE | Noted: 2023-05-08

## 2024-03-22 LAB
HBA1C MFR BLD: 6.1 % (ref ?–5.8)
POCT INT CON NEG: NEGATIVE
POCT INT CON POS: POSITIVE

## 2024-03-22 PROCEDURE — 83036 HEMOGLOBIN GLYCOSYLATED A1C: CPT | Performed by: PHYSICIAN ASSISTANT

## 2024-03-22 PROCEDURE — 82570 ASSAY OF URINE CREATININE: CPT

## 2024-03-22 PROCEDURE — G0439 PPPS, SUBSEQ VISIT: HCPCS | Performed by: PHYSICIAN ASSISTANT

## 2024-03-22 PROCEDURE — 3078F DIAST BP <80 MM HG: CPT | Performed by: PHYSICIAN ASSISTANT

## 2024-03-22 PROCEDURE — 1126F AMNT PAIN NOTED NONE PRSNT: CPT | Performed by: PHYSICIAN ASSISTANT

## 2024-03-22 PROCEDURE — 82043 UR ALBUMIN QUANTITATIVE: CPT

## 2024-03-22 PROCEDURE — 3074F SYST BP LT 130 MM HG: CPT | Performed by: PHYSICIAN ASSISTANT

## 2024-03-22 SDOH — ECONOMIC STABILITY: FOOD INSECURITY: WITHIN THE PAST 12 MONTHS, YOU WORRIED THAT YOUR FOOD WOULD RUN OUT BEFORE YOU GOT MONEY TO BUY MORE.: NEVER TRUE

## 2024-03-22 SDOH — ECONOMIC STABILITY: TRANSPORTATION INSECURITY
IN THE PAST 12 MONTHS, HAS LACK OF TRANSPORTATION KEPT YOU FROM MEETINGS, WORK, OR FROM GETTING THINGS NEEDED FOR DAILY LIVING?: NO

## 2024-03-22 SDOH — ECONOMIC STABILITY: FOOD INSECURITY: WITHIN THE PAST 12 MONTHS, THE FOOD YOU BOUGHT JUST DIDN'T LAST AND YOU DIDN'T HAVE MONEY TO GET MORE.: NEVER TRUE

## 2024-03-22 SDOH — ECONOMIC STABILITY: INCOME INSECURITY: HOW HARD IS IT FOR YOU TO PAY FOR THE VERY BASICS LIKE FOOD, HOUSING, MEDICAL CARE, AND HEATING?: NOT HARD AT ALL

## 2024-03-22 SDOH — ECONOMIC STABILITY: TRANSPORTATION INSECURITY
IN THE PAST 12 MONTHS, HAS THE LACK OF TRANSPORTATION KEPT YOU FROM MEDICAL APPOINTMENTS OR FROM GETTING MEDICATIONS?: NO

## 2024-03-22 ASSESSMENT — PAIN SCALES - GENERAL: PAINLEVEL: NO PAIN

## 2024-03-22 NOTE — ASSESSMENT & PLAN NOTE
Chronic, ongoing. Neuropathy is attributed to history of chemotherapy >25 years ago. He feels as though the numbness in his feet is worsening leading him to feel unsafe on his feet requiring him to use a walker. He also complains of intermittent paraesthesias that resolve with standing/walking. He has attempted PT in the past but did not find this impactful. Could consider trial of alpha lipoic acid as he is leery of adding another prescription medication. Advise follow up with PCP for further discussion.

## 2024-03-22 NOTE — ASSESSMENT & PLAN NOTE
Chronic, ongoing. Pt follows with cardiology who is advising pt maintain BP <130/80 with chlorthalidone 25mg daily, carvedilol 25mg daily and lisinopril 5mg daily. Plan to monitor small aneurysm with serial monitoring. Follow up with cardiology per routine.    5/2023 CTA Findings:  IMPRESSION:  1.  Ectatic ascending aorta measuring 3.8 cm.  2.  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 Mild focal dilatation measuring up to 2.7 cm.  3.  Atherosclerotic changes.

## 2024-03-22 NOTE — LETTER
[facility]  Debbie Clancy M.D.  [address]  [place] [address]  Fax: [phone]   Authorization for Release/Disclosure of   Protected Health Information   Name: SURINDER DENG : 1940 SSN: [id number]   Address: [address] [place] [address] [address]  [place] [address] Phone:    [phone] (home)    I authorize the entity listed below to release/disclose the PHI below to:   [facility]/Debbie Clancy M.D. and Devora Gtz P.A.-C.   Provider or Entity Name:     Address   City, State, Zip   Phone:      Fax:     Reason for request: continuity of care   Information to be released:    [  ] LAST COLONOSCOPY,  including any PATH REPORT and follow-up  [  ] LAST FIT/COLOGUARD RESULT [  ] LAST DEXA  [  ] LAST MAMMOGRAM  [  ] LAST PAP  [  ] LAST LABS [  ] RETINA EXAM REPORT  [  ] IMMUNIZATION RECORDS  [  ] Release all info      [  ] Check here and initial the line next to each item to release ALL health information INCLUDING  _____ Care and treatment for drug and / or alcohol abuse  _____ HIV testing, infection status, or AIDS  _____ Genetic Testing    DATES OF SERVICE OR TIME PERIOD TO BE DISCLOSED: _____________  I understand and acknowledge that:  * This Authorization may be revoked at any time by you in writing, except if your health information has already been used or disclosed.  * Your health information that will be used or disclosed as a result of you signing this authorization could be re-disclosed by the recipient. If this occurs, your re-disclosed health information may no longer be protected by State or Federal laws.  * You may refuse to sign this Authorization. Your refusal will not affect your ability to obtain treatment.  * This Authorization becomes effective upon signing and will  on (date) __________.      If no date is indicated, this Authorization will  one (1) year from the signature date.    Name: Surinder Deng  Signature: Date:   3/22/2024     PLEASE FAX  REQUESTED RECORDS BACK TO: [phone]

## 2024-03-22 NOTE — PROGRESS NOTES
Comprehensive Health Assessment Program     [facility] acute [facility] is a 83 y.o. here for his comprehensive health assessment.    Patient Active Problem List    Diagnosis Date Noted    Aortic atherosclerosis (HCC) 05/08/2023    Abdominal aortic aneurysm (AAA) (Formerly [facility]) 05/08/2023    Nonspecific abnormal results of function study of cardiovascular system 05/08/2023    Ectasia of artery (HCC) 02/21/2023    Onychomycosis 06/07/2022    Hiatal hernia 05/31/2022    Drug-induced polyneuropathy (Formerly [facility]) 05/31/2022    Overweight with body mass index (BMI) of 27 to 27.9 in adult 05/31/2022    Dry mouth 02/08/2022    Type 2 diabetes mellitus with circulatory disorder, without long-term current use of insulin (Formerly [facility]) 08/03/2021    Vitamin D deficiency 08/03/2021    Other hemorrhoids 02/02/2021    H/O colon cancer, stage I 02/02/2021    Hyperlipidemia 11/07/2019    Essential hypertension 11/07/2019    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 1 tab every day      Ascorbic Acid (VITAMIN C) 1000 MG Tab Take 1,000 mg by mouth. 2 tabs everyday      chlorthalidone (HYGROTON) 25 MG Tab TAKE 1 TABLET BY MOUTH EVERY  Tablet 0    carvedilol (COREG) 25 MG Tab TAKE 1 TABLET BY MOUTH TWICE DAILY WITH FOOD 100 Tablet 0    lisinopril (PRINIVIL) 5 MG Tab Take 1 Tablet by mouth every day. 100 Tablet 0    omeprazole (PRILOSEC) 20 MG delayed-release capsule TAKE 1 CAPSULE BY MOUTH EVERY  Capsule 2    ezetimibe (ZETIA) 10 MG Tab TAKE 1 TABLET BY MOUTH EVERY  Tablet 8    metFORMIN ER (GLUCOPHAGE XR) 500 MG TABLET SR 24 HR TAKE 1 TABLET BY MOUTH ONE TIME DAILY 100 Tablet 2    clopidogrel (PLAVIX) 75 MG Tab TAKE 1 TABLET BY MOUTH EVERY  Tablet 2    Cholecalciferol (VITAMIN D-3 PO) Take  by mouth.      atorvastatin (LIPITOR) 80 MG tablet TAKE 1 TABLET BY  MOUTH ONE TIME DAILY 100 Tablet 3    tadalafil (CIALIS) 10 MG tablet Take 1 tablet by mouth 1 time a day as needed for Erectile Dysfunction. 10 Tablet 3    diphenhydrAMINE (BENADRYL) 25 MG Tab Take 1 Each by mouth every 8 hours. 10 Tab 0    ketoconazole (NIZORAL) 2 % Cream Apply  topically every day.      betamethasone dipropionate (DIPROLENE) 0.05 % Ointment Apply to affected area twice daily as needed 50 g 2    EPINEPHrine (EPIPEN) 0.3 MG/0.3ML SOAJ solution for injection 1 Syringe by Injection route Once PRN (for severe allergic reaction) for up to 1 dose. 1 Each 1     No current facility-administered medications for this visit.          Current supplements as per medication list.     Allergies:   Peanut-derived, Food, and Iodine  Social History     Tobacco Use    Smoking status: Former    Smokeless tobacco: Never    Tobacco comments:     quit 23 yrs ago   Vaping Use    Vaping Use: Never used   Substance Use Topics    Alcohol use: Yes     Alcohol/week: 4.2 oz     Types: 7 Standard drinks or equivalent per week     Comment: daily cocktail ([employer]) martini every night    Drug use: No     History reviewed. No pertinent family history.  Morris  has a past medical history of Cancer (HCC), Cough with exposure to COVID-19 virus (6/23/2022), Dry mouth (2/8/2022), H/O colon cancer, stage I (2/2/2021), Hypertension, Neuropathy (2/2/2021), Onychomycosis (6/7/2022), Other hemorrhoids (2/2/2021), Type 2 diabetes mellitus with circulatory disorder, without long-term current use of insulin (HCC) (8/3/2021), and Vitamin D deficiency (8/3/2021).   Past Surgical History:   Procedure Laterality Date    OTHER ABDOMINAL SURGERY      colon resection       Screening:  In the last six months have you experienced any leakage of urine? No    Depression Screening  Little interest or pleasure in doing things?  0 - not at all  Feeling down, depressed , or hopeless? 0 - not at all  Patient Health Questionnaire Score: 0     If  depressive symptoms identified deferred to follow up visit unless specifically addressed in assessment and plan.    Interpretation of PHQ-9 Total Score   Score Severity   1-4 No Depression   5-9 Mild Depression   10-14 Moderate Depression   15-19 Moderately Severe Depression   20-27 Severe Depression    Screening for Cognitive Impairment  Do you or any of your friends or family members have any concern about your memory? No  Three Minute Recall (Leader, Season, Table) 1/3    [place] clock face with all 12 numbers and set the hands to show 10 minutes after 11.  Yes 5  Cognitive concerns identified deferred for follow up unless specifically addressed in assessment and plan.    Fall Risk Assessment  Has the patient had two or more falls in the last year or any fall with injury in the last year?  No    Safety Assessment  Do you always wear your seatbelt?  Yes  Any changes to home needed to function safely? No  Difficulty hearing.  No  Patient counseled about all safety risks that were identified.    Functional Assessment ADLs  Are there any barriers preventing you from cooking for yourself or meeting nutritional needs?  No.    Are there any barriers preventing you from driving safely or obtaining transportation?  Yes. Patient does not drive anymore   Are there any barriers preventing you from using a telephone or calling for help?  No    Are there any barriers preventing you from shopping?  No.    Are there any barriers preventing you from taking care of your own finances?  No    Are there any barriers preventing you from managing your medications?  No    Are there any barriers preventing you from showering, bathing or dressing yourself? No    Are there any barriers preventing you from doing housework or laundry? No  Are there any barriers preventing you from using the toilet?No  Are you currently engaging in any exercise or physical activity?  Yes. Walking everyday 1/2 mile     Self-Assessment of Health  What is your  perception of your health? Good  Do you sleep more than six hours a night? Yes  In the past 7 days, how much did pain keep you from doing your normal work? None  Do you spend quality time with family or friends (virtually or in person)? Yes  Do you usually eat a heart healthy diet that constists of a variety of fruits, vegetables, whole grains and fiber? Yes  Do you eat foods high in fat and/or Fast Food more than three times per week? No    Advance Care Planning  Do you have an Advance Directive, Living Will, Durable Power of , or POLST? No                 Health Maintenance Summary            Ordered - Diabetes: Urine Protein Screening (Yearly) Ordered on 3/22/2024      01/31/2022  MICROALBUMIN CREAT RATIO URINE              Overdue - Diabetes: Retinopathy Screening (Yearly) Overdue since 2/1/2023 02/01/2022  Done - pt states retinal completed results were normal              Overdue - Diabetes: Monofilament / LE Exam (Yearly) Overdue since 6/7/2023 06/07/2022  Diabetic Monofilament LE Exam              Overdue - Fasting Lipid Profile (Yearly) Overdue since 2/16/2024 02/16/2023  Lipid Profile    05/17/2021  Lipid Profile    12/22/2011  LIPID PROFILE              SERUM CREATININE (Yearly) Next due on 5/18/2024 05/18/2023  Basic Metabolic Panel    02/16/2023  Comp Metabolic Panel    01/25/2022  Comp Metabolic Panel    05/17/2021  Comp Metabolic Panel    02/13/2016  BASIC METABOLIC PANEL    Only the first 5 history entries have been loaded, but more history exists.              A1c Screening (Every 6 Months) Tentatively due on 9/22/2024 03/22/2024  POCT Hemoglobin A1C    06/07/2022  POCT A1C    02/14/2022  HEMOGLOBIN A1C    05/17/2021  HEMOGLOBIN A1C              Annual Wellness Visit (Yearly) Next due on 3/22/2025      03/22/2024  Level of Service: ID ANNUAL WELLNESS VISIT-INCLUDES PPPS SUBSEQUE*    05/08/2023  Level of Service: ID ANNUAL WELLNESS VISIT-INCLUDES PPPS SUBSEQUE*     05/31/2022  Level of Service: OK ANNUAL WELLNESS VISIT-INCLUDES PPPS SUBSEQUE*    08/03/2021  Subsequent Annual Wellness Visit - Includes PPPS ()              IMM DTaP/Tdap/Td Vaccine (4 - Td or Tdap) Next due on 4/17/2028 04/17/2018  Imm Admin: Tdap Vaccine    12/28/2010  Imm Admin: Tdap Vaccine    12/01/2010  Imm Admin: Tdap Vaccine              Pneumococcal Vaccine: 65+ Years (Series Information) Completed      11/03/2014  Imm Admin: Pneumococcal Conjugate Vaccine (Prevnar/PCV-13)    12/01/2011  Imm Admin: Pneumococcal polysaccharide vaccine (PPSV-23)              Zoster (Shingles) Vaccines (Series Information) Completed      08/03/2021  Imm Admin: Zoster Vaccine Recombinant (RZV) (SHINGRIX)    03/23/2021  Imm Admin: Zoster Vaccine Recombinant (RZV) (SHINGRIX)    07/09/2014  Imm Admin: Zoster Vaccine Live (ZVL) (Zostavax) - HISTORICAL DATA    03/01/2014  Imm Admin: Zoster Vaccine Live (ZVL) (Zostavax) - HISTORICAL DATA              Influenza Vaccine (Series Information) Completed      10/09/2023  Imm Admin: Influenza Vaccine Adult HD    11/16/2022  Imm Admin: Influenza Vaccine Adult HD    11/16/2021  Imm Admin: Influenza Vaccine Adult HD    09/23/2019  Imm Admin: Influenza Vaccine Adult HD    10/14/2018  Imm Admin: Influenza Vaccine Adult HD    Only the first 5 history entries have been loaded, but more history exists.              COVID-19 Vaccine (Series Information) Completed      11/04/2023  Imm Admin: Comirnaty (Covid-19 Vaccine, Mrna, [id number] Formula)    10/25/2022  Imm Admin: PFIZER BIVALENT SARS-COV-2 VACCINE (12+)    07/19/2022  Imm Admin: PFIZER DASILVA CAP SARS-COV-2 VACCINATION (12+)    12/20/2021  Imm Admin: PFIZER PURPLE CAP SARS-COV-2 VACCINATION (12+)    02/19/2021  Imm Admin: PFIZER PURPLE CAP SARS-COV-2 VACCINATION (12+)    Only the first 5 history entries have been loaded, but more history exists.              Hepatitis A Vaccine (Hep A) (Series Information) Aged Out      No completion  "history exists for this topic.              Hepatitis B Vaccine (Hep B) (Series Information) Aged Out      No completion history exists for this topic.              HPV Vaccines (Series Information) Aged Out      No completion history exists for this topic.              Polio Vaccine (Inactivated Polio) (Series Information) Aged Out      No completion history exists for this topic.              Meningococcal Immunization (Series Information) Aged Out      No completion history exists for this topic.              Discontinued - Colorectal Cancer Screening  Discontinued        Frequency changed to Never automatically (Topic No Longer Applies)    08/14/2017  REFERRAL TO GI FOR COLONOSCOPY    01/01/2017  Colon Cancer Screening Cologuard Stool (FIT DNA) (Reason not specified - Pt states colorectal compeleted results had polyp)                    Patient Care Team:  Debbie Clancy M.D. as PCP - General (Internal Medicine)      Financial Resource Strain: Low Risk  (3/22/2024)    Overall Financial Resource Strain (CARDIA)     Difficulty of Paying Living Expenses: Not hard at all      Transportation Needs: No Transportation Needs (3/22/2024)    PRAPARE - Transportation     Lack of Transportation (Medical): No     Lack of Transportation (Non-Medical): No      Food Insecurity: No Food Insecurity (3/22/2024)    Hunger Vital Sign     Worried About Running Out of Food in the Last Year: Never true     Ran Out of Food in the Last Year: Never true        Encounter Vitals  Blood Pressure : 124/74  Weight: 76.1 kg (167 lb 11.2 oz)  Height: 167.6 cm (5' 6\")  BMI (Calculated): 27.07  Pain Score: No pain     Alert, oriented in no acute distress.  Eye contact is good, speech goal directed, affect calm.    Assessment and Plan. The following treatment and monitoring plan is recommended:  Aortic atherosclerosis (HCC)  Chronic, stable. Diagnosis made following incidental finding of disease on imaging. Associated with HLD. Continue " "atorvastatin 80g daily. Encouraged Mediterranean diet and regular physical exercise. Follow up with PCP at least annually for continued monitoring.     Ultrasound aorta iliac 5/2/2023:  \"Diffuse atherosclerotic plaque within the abdominal aorta and bilateral iliac    arteries. \"    Essential hypertension  Chronic, stable. BP today 124/74. Pt expresses concern that his at home BP cuff may not be accurate as he gets readings of systolic BP>170. Encouraged him to bring cuff to clinic for comparison. Denies dizziness/lightheadedness, chest pain, dyspnea. Continue current treatment regime: chlorthalidone 25mg daily, carvedilol 25mg daily and lisinopril 5mg daily. Follow up with PCP annually for continued monitoring and management.     Hyperlipidemia  Chronic, stable. Maintains on statin therapy without issue. Most recent lipid panel from Feb 2023 WNL. Continue atorvastatin 80mg daily and ezetimibe 10mg daily with clopidogrel 75mg daily. Recommend Mediterranean diet and regular physical activity. Follow up with PCP at least annually for continued monitoring and management.   Lab Results   Component Value Date/Time    CHOLSTRLTOT 134 02/16/2023 09:52 AM    LDL 57 02/16/2023 09:52 AM    HDL 64 02/16/2023 09:52 AM    TRIGLYCERIDE 65 02/16/2023 09:52 AM       Type 2 diabetes mellitus with circulatory disorder, without long-term current use of insulin (HCC)  Chronic, ongoing issue, A1c 6.2 today. Last A1c 6.3 as of June 2022. Last monofilament foot exam: sees podiatrist Dr. Hobbs, last urine microalb/creat: 1/2022, last retinal screening: requesting records. Maintains on metformin 500mg daily. Attempted urine collection today. Continue to advise low carbohydrate diet with regular physical activity. Continue current treatment regime. Follow up with PCP as scheduled for continued monitoring and management.     Latest Reference Range & Units 05/17/21 12:48 02/14/22 11:14 06/07/22 11:40   Glycohemoglobin 0.0 - 5.6 % 6.7 (H) 6.5 " (H) 6.3 !   (H): Data is abnormally high  !: Data is abnormal    Drug-induced polyneuropathy (HCC)  Chronic, ongoing. Neuropathy is attributed to history of chemotherapy >25 years ago. He feels as though the numbness in his feet is worsening leading him to feel unsafe on his feet requiring him to use a walker. He also complains of intermittent paraesthesias that resolve with standing/walking. He has attempted PT in the past but did not find this impactful. Could consider trial of alpha lipoic acid as he is leery of adding another prescription medication. Advise follow up with PCP for further discussion.    Abdominal aortic aneurysm (AAA) (HCC)  Ectasia of artery (HCC)  Chronic, ongoing. Pt follows with cardiology who is advising pt maintain BP <130/80 with chlorthalidone 25mg daily, carvedilol 25mg daily and lisinopril 5mg daily. Plan to monitor small aneurysm with serial monitoring. Follow up with cardiology per routine.    5/2023 CTA Findings:  IMPRESSION:  1.  Ectatic ascending aorta measuring 3.8 cm.  2.  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 Mild focal dilatation measuring up to 2.7 cm.  3.  Atherosclerotic changes.    Services suggested: No services needed at this time  Health Care Screening: Age-appropriate preventive services recommended by USPTF and ACIP covered by Medicare were discussed today. Services ordered if indicated and agreed upon by the patient.  Referrals offered: Community-based lifestyle interventions to reduce health risks and promote self-management and wellness, fall prevention, nutrition, physical activity, tobacco-use cessation, weight loss, and mental health services as per orders if indicated.    Discussion today about general wellness and lifestyle habits:    Prevent falls and reduce trip hazards; Cautioned about securing or removing rugs.  Have a working fire alarm and carbon monoxide detector.  Engage in regular physical activity and social activities.    Follow-up: Return  for follow up visit with PCP as previously scheduled.

## 2024-03-23 LAB
CREAT UR-MCNC: 165.7 MG/DL
MICROALBUMIN UR-MCNC: 2.2 MG/DL
MICROALBUMIN/CREAT UR: 13 MG/G (ref 0–30)

## 2024-04-05 DIAGNOSIS — I10 ESSENTIAL HYPERTENSION: ICD-10-CM

## 2024-04-05 RX ORDER — CARVEDILOL 25 MG/1
TABLET ORAL
Qty: 100 TABLET | Refills: 3 | Status: SHIPPED | OUTPATIENT
Start: 2024-04-05

## 2024-04-05 RX ORDER — METFORMIN HYDROCHLORIDE 500 MG/1
TABLET, EXTENDED RELEASE ORAL
Qty: 100 TABLET | Refills: 3 | Status: SHIPPED | OUTPATIENT
Start: 2024-04-05

## 2024-04-05 NOTE — TELEPHONE ENCOUNTER
Is the patient due for a refill? Yes    Was the patient seen the past year? Yes    Date of last office visit: 03/22/2024    Does the patient have an upcoming appointment?  No   If yes, When?     Provider to refill:[facility]    Does the patients insurance require a 100 day supply?  Yes

## 2024-04-10 DIAGNOSIS — I10 ESSENTIAL HYPERTENSION: ICD-10-CM

## 2024-04-11 RX ORDER — LISINOPRIL 5 MG/1
5 TABLET ORAL DAILY
Qty: 100 TABLET | Refills: 2 | Status: SHIPPED | OUTPATIENT
Start: 2024-04-11

## 2024-04-11 NOTE — TELEPHONE ENCOUNTER
Is the patient due for a refill? Yes    Was the patient seen the past year? Yes    Date of last office visit: 01/29/2024    Does the patient have an upcoming appointment?  No   If yes, When?     Provider to refill:[facility]    Does the patients insurance require a 100 day supply?  Yes

## 2024-05-06 ENCOUNTER — PHARMACY VISIT (OUTPATIENT)
Dept: PHARMACY | Facility: MEDICAL CENTER | Age: 84
End: 2024-05-06
Payer: COMMERCIAL

## 2024-05-06 PROCEDURE — RXMED WILLOW AMBULATORY MEDICATION CHARGE: Performed by: INTERNAL MEDICINE

## 2024-05-18 DIAGNOSIS — E78.5 HYPERLIPIDEMIA, UNSPECIFIED HYPERLIPIDEMIA TYPE: ICD-10-CM

## 2024-05-18 DIAGNOSIS — Z51.81 ENCOUNTER FOR MONITORING DIURETIC THERAPY: ICD-10-CM

## 2024-05-18 DIAGNOSIS — E78.49 OTHER HYPERLIPIDEMIA: ICD-10-CM

## 2024-05-18 DIAGNOSIS — Z79.899 ENCOUNTER FOR MONITORING DIURETIC THERAPY: ICD-10-CM

## 2024-05-20 RX ORDER — CHLORTHALIDONE 25 MG/1
25 TABLET ORAL DAILY
Qty: 50 TABLET | Refills: 0 | Status: SHIPPED | OUTPATIENT
Start: 2024-05-20

## 2024-05-20 RX ORDER — CLOPIDOGREL BISULFATE 75 MG/1
75 TABLET ORAL DAILY
Qty: 100 TABLET | Refills: 2 | Status: SHIPPED | OUTPATIENT
Start: 2024-05-20

## 2024-05-21 DIAGNOSIS — N52.9 VASCULOGENIC ERECTILE DYSFUNCTION, UNSPECIFIED VASCULOGENIC ERECTILE DYSFUNCTION TYPE: ICD-10-CM

## 2024-05-21 RX ORDER — TADALAFIL 10 MG/1
10 TABLET ORAL
Qty: 10 TABLET | Refills: 0 | Status: SHIPPED | OUTPATIENT
Start: 2024-05-21

## 2024-05-21 NOTE — TELEPHONE ENCOUNTER
Received request via: Patient    Was the patient seen in the last year in this department? No - Patient has not been seen by PCP since 2022    Does the patient have an active prescription (recently filled or refills available) for medication(s) requested? No    Pharmacy Name: [facility]    Does the patient have residential Plus and need 100 day supply (blood pressure, diabetes and cholesterol meds only)? Medication is not for cholesterol, blood pressure or diabetes

## 2024-05-24 PROCEDURE — RXMED WILLOW AMBULATORY MEDICATION CHARGE: Performed by: INTERNAL MEDICINE

## 2024-05-28 ENCOUNTER — PHARMACY VISIT (OUTPATIENT)
Dept: PHARMACY | Facility: MEDICAL CENTER | Age: 84
End: 2024-05-28
Payer: COMMERCIAL

## 2024-06-10 ENCOUNTER — HOSPITAL ENCOUNTER (OUTPATIENT)
Dept: LAB | Facility: MEDICAL CENTER | Age: 84
End: 2024-06-10
Attending: INTERNAL MEDICINE
Payer: MEDICARE

## 2024-06-10 DIAGNOSIS — Z79.899 ENCOUNTER FOR MONITORING DIURETIC THERAPY: ICD-10-CM

## 2024-06-10 DIAGNOSIS — Z51.81 ENCOUNTER FOR MONITORING DIURETIC THERAPY: ICD-10-CM

## 2024-06-10 LAB
ANION GAP SERPL CALC-SCNC: 11 MMOL/L (ref 7–16)
BUN SERPL-MCNC: 27 MG/DL (ref 8–22)
CALCIUM SERPL-MCNC: 9.5 MG/DL (ref 8.5–10.5)
CHLORIDE SERPL-SCNC: 106 MMOL/L (ref 96–112)
CO2 SERPL-SCNC: 23 MMOL/L (ref 20–33)
CREAT SERPL-MCNC: 1.23 MG/DL (ref 0.5–1.4)
GFR SERPLBLD CREATININE-BSD FMLA CKD-EPI: 59 ML/MIN/1.73 M 2
GLUCOSE SERPL-MCNC: 113 MG/DL (ref 65–99)
POTASSIUM SERPL-SCNC: 4.4 MMOL/L (ref 3.6–5.5)
SODIUM SERPL-SCNC: 140 MMOL/L (ref 135–145)

## 2024-06-10 PROCEDURE — 80048 BASIC METABOLIC PNL TOTAL CA: CPT

## 2024-06-10 PROCEDURE — 36415 COLL VENOUS BLD VENIPUNCTURE: CPT

## 2024-06-11 ENCOUNTER — APPOINTMENT (OUTPATIENT)
Dept: MEDICAL GROUP | Facility: PHYSICIAN GROUP | Age: 84
End: 2024-06-11
Payer: MEDICARE

## 2024-09-03 ENCOUNTER — APPOINTMENT (OUTPATIENT)
Dept: MEDICAL GROUP | Facility: PHYSICIAN GROUP | Age: 84
End: 2024-09-03
Payer: MEDICARE

## 2024-09-03 RX ORDER — ATORVASTATIN CALCIUM 80 MG/1
80 TABLET, FILM COATED ORAL
Qty: 100 TABLET | Refills: 0 | Status: SHIPPED | OUTPATIENT
Start: 2024-09-03

## 2024-09-03 NOTE — TELEPHONE ENCOUNTER
Received request via: Pharmacy    Was the patient seen in the last year in this department? Yes    Does the patient have an active prescription (recently filled or refills available) for medication(s) requested? No    Pharmacy Name: [facility]    Does the patient have skilled nursing Plus and need 100-day supply? (This applies to ALL medications) Yes, quantity updated to 100 days

## 2024-09-08 DIAGNOSIS — E78.49 OTHER HYPERLIPIDEMIA: ICD-10-CM

## 2024-09-08 DIAGNOSIS — I10 ESSENTIAL HYPERTENSION: ICD-10-CM

## 2024-09-09 DIAGNOSIS — N52.9 VASCULOGENIC ERECTILE DYSFUNCTION, UNSPECIFIED VASCULOGENIC ERECTILE DYSFUNCTION TYPE: ICD-10-CM

## 2024-09-09 RX ORDER — CHLORTHALIDONE 25 MG/1
25 TABLET ORAL DAILY
Qty: 90 TABLET | Refills: 1 | Status: SHIPPED | OUTPATIENT
Start: 2024-09-09

## 2024-09-10 RX ORDER — TADALAFIL 10 MG/1
10 TABLET ORAL
Qty: 10 TABLET | Refills: 0 | Status: SHIPPED | OUTPATIENT
Start: 2024-09-10

## 2024-10-14 ENCOUNTER — PHARMACY VISIT (OUTPATIENT)
Dept: PHARMACY | Facility: MEDICAL CENTER | Age: 84
End: 2024-10-14
Payer: COMMERCIAL

## 2024-10-14 PROCEDURE — RXMED WILLOW AMBULATORY MEDICATION CHARGE: Performed by: INTERNAL MEDICINE

## 2024-10-22 ENCOUNTER — APPOINTMENT (OUTPATIENT)
Dept: MEDICAL GROUP | Facility: PHYSICIAN GROUP | Age: 84
End: 2024-10-22
Payer: MEDICARE

## 2024-11-07 DIAGNOSIS — I10 ESSENTIAL HYPERTENSION: ICD-10-CM

## 2024-11-07 RX ORDER — CARVEDILOL 25 MG/1
TABLET ORAL
Qty: 100 TABLET | Refills: 0 | Status: SHIPPED | OUTPATIENT
Start: 2024-11-07

## 2024-11-12 ENCOUNTER — APPOINTMENT (RX ONLY)
Dept: URBAN - METROPOLITAN AREA CLINIC 20 | Facility: CLINIC | Age: 84
Setting detail: DERMATOLOGY
End: 2024-11-12

## 2024-11-12 DIAGNOSIS — D18.0 HEMANGIOMA: ICD-10-CM

## 2024-11-12 DIAGNOSIS — Q82.5 CONGENITAL NON-NEOPLASTIC NEVUS: ICD-10-CM

## 2024-11-12 DIAGNOSIS — L81.4 OTHER MELANIN HYPERPIGMENTATION: ICD-10-CM

## 2024-11-12 DIAGNOSIS — Z85.820 PERSONAL HISTORY OF MALIGNANT MELANOMA OF SKIN: ICD-10-CM

## 2024-11-12 DIAGNOSIS — D22 MELANOCYTIC NEVI: ICD-10-CM

## 2024-11-12 DIAGNOSIS — L57.8 OTHER SKIN CHANGES DUE TO CHRONIC EXPOSURE TO NONIONIZING RADIATION: ICD-10-CM

## 2024-11-12 DIAGNOSIS — Z85.828 PERSONAL HISTORY OF OTHER MALIGNANT NEOPLASM OF SKIN: ICD-10-CM

## 2024-11-12 DIAGNOSIS — L82.1 OTHER SEBORRHEIC KERATOSIS: ICD-10-CM

## 2024-11-12 PROBLEM — D18.01 HEMANGIOMA OF SKIN AND SUBCUTANEOUS TISSUE: Status: ACTIVE | Noted: 2024-11-12

## 2024-11-12 PROBLEM — D22.5 MELANOCYTIC NEVI OF TRUNK: Status: ACTIVE | Noted: 2024-11-12

## 2024-11-12 PROCEDURE — ? COUNSELING

## 2024-11-12 PROCEDURE — ? DIAGNOSIS COMMENT

## 2024-11-12 PROCEDURE — 99213 OFFICE O/P EST LOW 20 MIN: CPT

## 2024-11-12 ASSESSMENT — LOCATION DETAILED DESCRIPTION DERM
LOCATION DETAILED: RIGHT SUPERIOR POSTAURICULAR SKIN
LOCATION DETAILED: INFERIOR THORACIC SPINE
LOCATION DETAILED: RIGHT ANTERIOR PROXIMAL UPPER ARM
LOCATION DETAILED: RIGHT ANTERIOR DISTAL THIGH
LOCATION DETAILED: RIGHT CENTRAL MALAR CHEEK
LOCATION DETAILED: LEFT DISTAL DORSAL FOREARM
LOCATION DETAILED: RIGHT CLAVICULAR SKIN
LOCATION DETAILED: NASAL TIP
LOCATION DETAILED: RIGHT DISTAL DORSAL FOREARM
LOCATION DETAILED: LEFT ANTERIOR PROXIMAL UPPER ARM
LOCATION DETAILED: LEFT INFERIOR CENTRAL MALAR CHEEK
LOCATION DETAILED: LEFT ANTERIOR DISTAL THIGH
LOCATION DETAILED: LEFT SUPERIOR FOREHEAD
LOCATION DETAILED: RIGHT SUPERIOR MEDIAL UPPER BACK
LOCATION DETAILED: RIGHT INFERIOR UPPER BACK
LOCATION DETAILED: RIGHT PROXIMAL DORSAL FOREARM
LOCATION DETAILED: LEFT FOREHEAD
LOCATION DETAILED: LEFT MEDIAL SUPERIOR CHEST
LOCATION DETAILED: RIGHT MID-UPPER BACK

## 2024-11-12 ASSESSMENT — LOCATION ZONE DERM
LOCATION ZONE: ARM
LOCATION ZONE: LEG
LOCATION ZONE: TRUNK
LOCATION ZONE: SCALP
LOCATION ZONE: FACE
LOCATION ZONE: NOSE

## 2024-11-12 ASSESSMENT — LOCATION SIMPLE DESCRIPTION DERM
LOCATION SIMPLE: RIGHT FOREARM
LOCATION SIMPLE: UPPER BACK
LOCATION SIMPLE: RIGHT UPPER BACK
LOCATION SIMPLE: LEFT FOREARM
LOCATION SIMPLE: NOSE
LOCATION SIMPLE: LEFT CHEEK
LOCATION SIMPLE: CHEST
LOCATION SIMPLE: RIGHT UPPER ARM
LOCATION SIMPLE: LEFT UPPER ARM
LOCATION SIMPLE: SCALP
LOCATION SIMPLE: LEFT FOREHEAD
LOCATION SIMPLE: LEFT THIGH
LOCATION SIMPLE: RIGHT THIGH
LOCATION SIMPLE: RIGHT CHEEK
LOCATION SIMPLE: RIGHT CLAVICULAR SKIN

## 2024-11-18 RX ORDER — EZETIMIBE 10 MG/1
10 TABLET ORAL DAILY
Qty: 100 TABLET | Refills: 3 | Status: SHIPPED | OUTPATIENT
Start: 2024-11-18

## 2024-11-27 PROCEDURE — RXMED WILLOW AMBULATORY MEDICATION CHARGE: Performed by: FAMILY MEDICINE

## 2024-11-27 RX ORDER — ATORVASTATIN CALCIUM 80 MG/1
80 TABLET, FILM COATED ORAL
Qty: 100 TABLET | Refills: 0 | Status: SHIPPED | OUTPATIENT
Start: 2024-11-27

## 2024-11-27 NOTE — TELEPHONE ENCOUNTER
Was the patient seen in the last year in this department? Yes   Does patient have an active prescription for medications requested? No   Received Request Via: Pharmacy

## 2024-12-01 ENCOUNTER — PHARMACY VISIT (OUTPATIENT)
Dept: PHARMACY | Facility: MEDICAL CENTER | Age: 84
End: 2024-12-01
Payer: COMMERCIAL

## 2024-12-20 ENCOUNTER — APPOINTMENT (OUTPATIENT)
Dept: MEDICAL GROUP | Facility: PHYSICIAN GROUP | Age: 84
End: 2024-12-20
Payer: MEDICARE

## 2024-12-24 ENCOUNTER — APPOINTMENT (OUTPATIENT)
Dept: MEDICAL GROUP | Facility: PHYSICIAN GROUP | Age: 84
End: 2024-12-24
Payer: MEDICARE

## 2024-12-31 ENCOUNTER — TELEPHONE (OUTPATIENT)
Dept: CARDIOLOGY | Facility: MEDICAL CENTER | Age: 84
End: 2024-12-31
Payer: MEDICARE

## 2024-12-31 NOTE — TELEPHONE ENCOUNTER
Spoke with pt in regards to getting their imaging done prior to their next appt with [facility]. Pt said they will get the imaging done prior to their next appt.

## 2025-01-14 ENCOUNTER — APPOINTMENT (OUTPATIENT)
Dept: CARDIOLOGY | Facility: MEDICAL CENTER | Age: 85
End: 2025-01-14
Attending: INTERNAL MEDICINE
Payer: MEDICARE

## 2025-01-15 DIAGNOSIS — I10 ESSENTIAL HYPERTENSION: ICD-10-CM

## 2025-01-15 RX ORDER — CARVEDILOL 25 MG/1
TABLET ORAL
Qty: 100 TABLET | Refills: 0 | Status: SHIPPED | OUTPATIENT
Start: 2025-01-15

## 2025-01-15 NOTE — TELEPHONE ENCOUNTER
Is the patient due for a refill? Yes    Was the patient seen the past year? Yes    Date of last office visit: 01/29/2024    Does the patient have an upcoming appointment?  Yes   If yes, When? 03/28/2025    Provider to refill:[facility]    Does the patient have [facility] Plus and need 100-day supply? (This applies to ALL medications) Yes, quantity updated to 100 days

## 2025-02-05 ENCOUNTER — PHARMACY VISIT (OUTPATIENT)
Dept: PHARMACY | Facility: MEDICAL CENTER | Age: 85
End: 2025-02-05
Payer: COMMERCIAL

## 2025-02-05 DIAGNOSIS — Z79.899 LONG TERM CURRENT USE OF DIURETIC: ICD-10-CM

## 2025-02-05 DIAGNOSIS — I10 ESSENTIAL HYPERTENSION: ICD-10-CM

## 2025-02-05 PROCEDURE — RXMED WILLOW AMBULATORY MEDICATION CHARGE: Performed by: INTERNAL MEDICINE

## 2025-02-05 RX ORDER — CHLORTHALIDONE 25 MG/1
25 TABLET ORAL DAILY
Qty: 90 TABLET | Refills: 0 | Status: SHIPPED | OUTPATIENT
Start: 2025-02-05

## 2025-02-05 NOTE — TELEPHONE ENCOUNTER
Last BMP: 6.10.2024 (Must be every 6 months)  Last two creatinine levels: 1.01, 1.23 (must be within 0.2 of each other)  Potassium: 4.4 (Must be between 4 and 5)  Sodium: 140 (Must be between 135 and 145)

## 2025-02-18 ENCOUNTER — APPOINTMENT (OUTPATIENT)
Dept: MEDICAL GROUP | Facility: PHYSICIAN GROUP | Age: 85
End: 2025-02-18
Payer: MEDICARE

## 2025-02-18 VITALS
OXYGEN SATURATION: 95 % | TEMPERATURE: 98.2 F | HEIGHT: 66 IN | SYSTOLIC BLOOD PRESSURE: 120 MMHG | BODY MASS INDEX: 25.54 KG/M2 | HEART RATE: 55 BPM | WEIGHT: 158.9 LBS | DIASTOLIC BLOOD PRESSURE: 64 MMHG | RESPIRATION RATE: 12 BRPM

## 2025-02-18 DIAGNOSIS — E78.49 OTHER HYPERLIPIDEMIA: ICD-10-CM

## 2025-02-18 DIAGNOSIS — G62.0 DRUG-INDUCED POLYNEUROPATHY (HCC): ICD-10-CM

## 2025-02-18 DIAGNOSIS — E11.59 TYPE 2 DIABETES MELLITUS WITH OTHER CIRCULATORY COMPLICATION, WITHOUT LONG-TERM CURRENT USE OF INSULIN (HCC): ICD-10-CM

## 2025-02-18 DIAGNOSIS — E55.9 VITAMIN D DEFICIENCY: ICD-10-CM

## 2025-02-18 DIAGNOSIS — R63.4 WEIGHT LOSS: ICD-10-CM

## 2025-02-18 DIAGNOSIS — I71.40 ABDOMINAL AORTIC ANEURYSM (AAA), UNSPECIFIED PART, UNSPECIFIED WHETHER RUPTURED (HCC): ICD-10-CM

## 2025-02-18 PROBLEM — R94.30: Status: RESOLVED | Noted: 2023-05-08 | Resolved: 2025-02-18

## 2025-02-18 LAB
HBA1C MFR BLD: 6.2 % (ref ?–5.8)
POCT INT CON NEG: NEGATIVE
POCT INT CON POS: POSITIVE

## 2025-02-18 PROCEDURE — 99214 OFFICE O/P EST MOD 30 MIN: CPT | Performed by: INTERNAL MEDICINE

## 2025-02-18 PROCEDURE — 3074F SYST BP LT 130 MM HG: CPT | Performed by: INTERNAL MEDICINE

## 2025-02-18 PROCEDURE — 3078F DIAST BP <80 MM HG: CPT | Performed by: INTERNAL MEDICINE

## 2025-02-18 PROCEDURE — 83036 HEMOGLOBIN GLYCOSYLATED A1C: CPT | Performed by: INTERNAL MEDICINE

## 2025-02-18 ASSESSMENT — PATIENT HEALTH QUESTIONNAIRE - PHQ9: CLINICAL INTERPRETATION OF PHQ2 SCORE: 0

## 2025-02-18 NOTE — PROGRESS NOTES
CC: Type II DM, aortic aneurysm    HPI:  Morris presents with the following:  Last seen June 2022  Patient doing well.    1. Type 2 diabetes mellitus with other circulatory complication, without long-term current use of insulin (HCC)  Chronic.  Stable.  Patient takes metformin 500 mg tablets daily.  Does not check his blood sugars regularly.  Hemoglobin A1c 6.2 today.    2. Abdominal aortic aneurysm (AAA), unspecified part, unspecified whether ruptured (HCC)  Chronic.  Stable.  Followed by cardiology, last seen January 2024.  Most recent imaging was CT-CTA which showed ascending aorta measuring 3.8 cm.  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  Patient's blood pressure has been well-controlled with Coreg.  Patient taking high-dose Lipitor 80 mg daily, Zetia 10 mg daily.  Patient has an upcoming appointment with cardiology on March 28.    3. Weight loss  Noted 9 pound weight loss in 1 year.  Patient states that he is currently living at the [facility] independent living and has been walking quite a bit.  Patient does not have any complaints.  Eating well.    4. Drug-induced polyneuropathy (HCC)  Chronic.  Stable.  Neuropathy secondary to history of chemotherapy greater than 25 years ago.  Patient states that his neuropathy has slowly been worsening, using a walker.  Intermittent paresthesias and cramping at nighttime.    5. Other hyperlipidemia  Chronic.  Stable.  Taking Lipitor and Zetia.        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    Onychomycosis 06/07/2022    Hiatal hernia 05/31/2022    Drug-induced polyneuropathy (HCC) 05/31/2022    Overweight with body mass index (BMI) of 27 to 27.9 in adult 05/31/2022    Dry mouth 02/08/2022    Type 2 diabetes mellitus with circulatory disorder, without long-term current use of insulin (HCC)  08/03/2021    Vitamin D deficiency 08/03/2021    Other hemorrhoids 02/02/2021    H/O colon cancer, stage I 02/02/2021    Hyperlipidemia 11/07/2019    Essential hypertension 11/07/2019    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 100 Tablet 0    ezetimibe (ZETIA) 10 MG Tab TAKE 1 TABLET BY MOUTH EVERY  Tablet 3    omeprazole (PRILOSEC) 20 MG delayed-release capsule Take 1 Capsule by mouth every day. 100 Capsule 2    tadalafil (CIALIS) 10 MG tablet Take 1 tablet by mouth 1 time a day as needed for Erectile Dysfunction. 10 Tablet 0    clopidogrel (PLAVIX) 75 MG Tab TAKE 1 TABLET BY MOUTH EVERY  Tablet 2    lisinopril (PRINIVIL) 5 MG Tab Take 1 Tablet by mouth every day. 100 Tablet 2    metFORMIN ER (GLUCOPHAGE XR) 500 MG TABLET SR 24 HR TAKE 1 TABLET BY MOUTH EVERY  Tablet 3    Magnesium Gluconate (MAGNESIUM 27 PO) Take  by mouth 2 times a day. 2 tab every day      Ascorbic Acid (VITAMIN C) 1000 MG Tab Take 1,000 mg by mouth. 2 tabs everyday      Cholecalciferol (VITAMIN D-3 PO) Take  by mouth.      betamethasone dipropionate (DIPROLENE) 0.05 % Ointment Apply to affected area twice daily as needed 50 g 2    diphenhydrAMINE (BENADRYL) 25 MG Tab Take 1 Each by mouth every 8 hours. 10 Tab 0    EPINEPHrine (EPIPEN) 0.3 MG/0.3ML SOAJ solution for injection 1 Syringe by Injection route Once PRN (for severe allergic reaction) for up to 1 dose. 1 Each 1    ketoconazole (NIZORAL) 2 % Cream Apply  topically every day.       No current facility-administered medications for this visit.         Allergies as of 02/18/2025 - Reviewed 02/18/2025    Allergen Reaction Noted    Peanut-derived Anaphylaxis 12/01/2014    Food  02/13/2016    Iodine  12/03/2009        Social History     Socioeconomic History    Marital status:      Spouse name: Not on file    Number of children: Not on file    Years of education: Not on file    Highest education level: Not on file   Occupational History    Not on file   Tobacco Use    Smoking status: Former    Smokeless tobacco: Never    Tobacco comments:     quit 23 yrs ago   Vaping Use    Vaping status: Never Used   Substance and Sexual Activity    Alcohol use: Yes     Alcohol/week: 4.2 oz     Types: 7 Standard drinks or equivalent per week     Comment: daily cocktail (vodka rocks) martini every night    Drug use: No    Sexual activity: Not Currently   Other Topics Concern    Not on file   Social History Narrative    Not on file     Social Drivers of Health     Financial Resource Strain: Low Risk  (3/22/2024)    Overall Financial Resource Strain (CARDIA)     Difficulty of Paying Living Expenses: Not hard at all   Food Insecurity: No Food Insecurity (3/22/2024)    Hunger Vital Sign     Worried About Running Out of Food in the Last Year: Never true     Ran Out of Food in the Last Year: Never true   Transportation Needs: No Transportation Needs (3/22/2024)    PRAPARE - Transportation     Lack of Transportation (Medical): No     Lack of Transportation (Non-Medical): No   Physical Activity: Not on file   Stress: Not on file   Social Connections: Not on file   Intimate Partner Violence: Not on file   Housing Stability: Not on file       History reviewed. No pertinent family history.    Past Surgical History:   Procedure Laterality Date    OTHER ABDOMINAL SURGERY      colon resection       ROS: Positive ROS per HPI.  Denies any Headache,Chest pain,  Shortness of breath,  Abdominal pain, Changes of bowel or bladder, Lower ext edema, Fevers, Nights sweats, Weight Changes, Focal weakness or numbness.  All other systems are  "negative.    /64 (BP Location: Right arm, Patient Position: Sitting)   Pulse (!) 55   Temp 36.8 °C (98.2 °F) (Temporal)   Resp 12   Ht 1.676 m (5' 6\")   Wt 72.1 kg (158 lb 14.4 oz)   SpO2 95%   BMI 25.65 kg/m²      Constitutional: Alert, no distress, well-groomed.  Skin: Warm, dry, good turgor, no rashes in visible areas.  Eye: Equal, round and reactive, conjunctiva clear, lids normal.  ENMT: Lips without lesions, good dentition, moist mucous membranes.  Neck: Trachea midline, no masses, no thyromegaly.  Respiratory: Unlabored respiratory effort, no cough.  Abdomen: Soft, no gross masses.  MSK: Normal gait, moves all extremities.  Neuro: Grossly non-focal. No cranial nerve deficit. Strength and sensation intact.   Psych: Alert and oriented x3, normal affect and mood.      Assessment and Plan.   84 y.o. male presenting with the following.     1. Type 2 diabetes mellitus with other circulatory complication, without long-term current use of insulin (HCC)  Patient will be due for monofilament and diabetic retinal exam on next visit.    - POCT Hemoglobin A1C  - CBC WITH DIFFERENTIAL; Future  - Comp Metabolic Panel; Future  - Microalbumin Creat Ratio Urine - Lab Collect; Future    2. Abdominal aortic aneurysm (AAA), unspecified part, unspecified whether ruptured (HCC)  Chronic.  Stable.  Patient will call cardiology to request updated imaging ordered prior to visit.  Continue to maintain good blood pressure readings.    3. Weight loss  Continue to monitor.  - TSH WITH REFLEX TO FT4; Future    4. Drug-induced polyneuropathy (HCC)    - VITAMIN B12; Future  - FOLATE; Future    5. Other hyperlipidemia    - Lipid Profile; Future    6. Vitamin D deficiency    - VITAMIN D,25 HYDROXY (DEFICIENCY); Future    "

## 2025-03-24 ENCOUNTER — TELEPHONE (OUTPATIENT)
Dept: CARDIOLOGY | Facility: MEDICAL CENTER | Age: 85
End: 2025-03-24
Payer: MEDICARE

## 2025-03-24 DIAGNOSIS — I10 ESSENTIAL HYPERTENSION: ICD-10-CM

## 2025-03-24 PROCEDURE — RXMED WILLOW AMBULATORY MEDICATION CHARGE: Performed by: INTERNAL MEDICINE

## 2025-03-24 RX ORDER — CARVEDILOL 25 MG/1
25 TABLET ORAL 2 TIMES DAILY WITH MEALS
Qty: 100 TABLET | Refills: 0 | Status: SHIPPED | OUTPATIENT
Start: 2025-03-24

## 2025-03-24 RX ORDER — CARVEDILOL 25 MG/1
25 TABLET ORAL 2 TIMES DAILY WITH MEALS
Qty: 200 TABLET | Refills: 0 | OUTPATIENT
Start: 2025-03-24

## 2025-03-24 NOTE — TELEPHONE ENCOUNTER
VR    Caller: [facility]    Topic/issue: Patient got a message that this refill request was denied. Would like an explanation as to why? He is currently scheduled to see VR on 3/28. Requesting a callback.     Callback Number: [phone]    Thank you,  Asiya QUINN

## 2025-03-24 NOTE — TELEPHONE ENCOUNTER
Is the patient due for a refill? Yes    Was the patient seen the last 15 months? No    Date of last office visit: 1/29/2024    Does the patient have an upcoming appointment?  Yes   If yes, When? 03/28/2025    Provider to refill:VR    Does the patient have [facility] and need 100-day supply? (This applies to ALL medications) Yes, quantity updated to 100 days

## 2025-03-27 ENCOUNTER — HOSPITAL ENCOUNTER (OUTPATIENT)
Dept: LAB | Facility: MEDICAL CENTER | Age: 85
End: 2025-03-27
Attending: INTERNAL MEDICINE
Payer: MEDICARE

## 2025-03-27 DIAGNOSIS — Z79.899 LONG TERM CURRENT USE OF DIURETIC: ICD-10-CM

## 2025-03-27 LAB
ANION GAP SERPL CALC-SCNC: 13 MMOL/L (ref 7–16)
BUN SERPL-MCNC: 25 MG/DL (ref 8–22)
CALCIUM SERPL-MCNC: 9.9 MG/DL (ref 8.5–10.5)
CHLORIDE SERPL-SCNC: 105 MMOL/L (ref 96–112)
CO2 SERPL-SCNC: 24 MMOL/L (ref 20–33)
CREAT SERPL-MCNC: 1.34 MG/DL (ref 0.5–1.4)
GFR SERPLBLD CREATININE-BSD FMLA CKD-EPI: 53 ML/MIN/1.73 M 2
GLUCOSE SERPL-MCNC: 123 MG/DL (ref 65–99)
POTASSIUM SERPL-SCNC: 4.6 MMOL/L (ref 3.6–5.5)
SODIUM SERPL-SCNC: 142 MMOL/L (ref 135–145)

## 2025-03-27 PROCEDURE — 36415 COLL VENOUS BLD VENIPUNCTURE: CPT

## 2025-03-27 PROCEDURE — 80048 BASIC METABOLIC PNL TOTAL CA: CPT

## 2025-03-28 ENCOUNTER — RESULTS FOLLOW-UP (OUTPATIENT)
Dept: CARDIOLOGY | Facility: MEDICAL CENTER | Age: 85
End: 2025-03-28
Payer: MEDICARE

## 2025-03-28 ENCOUNTER — OFFICE VISIT (OUTPATIENT)
Dept: CARDIOLOGY | Facility: MEDICAL CENTER | Age: 85
End: 2025-03-28
Attending: INTERNAL MEDICINE
Payer: MEDICARE

## 2025-03-28 VITALS
WEIGHT: 155 LBS | HEIGHT: 66 IN | DIASTOLIC BLOOD PRESSURE: 70 MMHG | BODY MASS INDEX: 24.91 KG/M2 | OXYGEN SATURATION: 94 % | HEART RATE: 52 BPM | SYSTOLIC BLOOD PRESSURE: 120 MMHG

## 2025-03-28 DIAGNOSIS — I10 ESSENTIAL HYPERTENSION: ICD-10-CM

## 2025-03-28 DIAGNOSIS — R93.1 AGATSTON CORONARY ARTERY CALCIUM SCORE GREATER THAN 400: ICD-10-CM

## 2025-03-28 DIAGNOSIS — I71.40 ABDOMINAL AORTIC ANEURYSM (AAA), UNSPECIFIED PART, UNSPECIFIED WHETHER RUPTURED (HCC): ICD-10-CM

## 2025-03-28 DIAGNOSIS — I70.0 AORTIC ATHEROSCLEROSIS (HCC): ICD-10-CM

## 2025-03-28 DIAGNOSIS — E78.49 OTHER HYPERLIPIDEMIA: ICD-10-CM

## 2025-03-28 PROBLEM — N18.31 CHRONIC KIDNEY DISEASE, STAGE 3A: Status: ACTIVE | Noted: 2025-03-28

## 2025-03-28 PROCEDURE — 99213 OFFICE O/P EST LOW 20 MIN: CPT | Performed by: INTERNAL MEDICINE

## 2025-03-28 ASSESSMENT — ENCOUNTER SYMPTOMS
IRREGULAR HEARTBEAT: 0
DECREASED APPETITE: 0
FLANK PAIN: 0
VOMITING: 0
HEARTBURN: 0
NAUSEA: 0
ABDOMINAL PAIN: 0
BACK PAIN: 0
COUGH: 0
DYSPNEA ON EXERTION: 0
DEPRESSION: 0
ALTERED MENTAL STATUS: 0
WEIGHT GAIN: 0
WEIGHT LOSS: 0
FEVER: 0
CONSTIPATION: 0
DIARRHEA: 0
DIZZINESS: 0
PALPITATIONS: 0
BLURRED VISION: 0
ORTHOPNEA: 0
SYNCOPE: 0
SHORTNESS OF BREATH: 0
NEAR-SYNCOPE: 0
CLAUDICATION: 0
PND: 0

## 2025-03-28 NOTE — PROGRESS NOTES
Cardiology Note    hypertension    History of Present Illness: Morris Deng is a 84 y.o. male PMH CAC on CT, HTN, HLD, DM2, small infrarenal aortic aneurysm who presents for follow up.    Continues to feel well. No cardiac complaints. Compliant with medications and denies adverse effects.     Review of Systems   Constitutional: Negative for decreased appetite, fever, malaise/fatigue, weight gain and weight loss.   HENT:  Negative for congestion and nosebleeds.    Eyes:  Negative for blurred vision.   Cardiovascular:  Negative for chest pain, claudication, dyspnea on exertion, irregular heartbeat, leg swelling, near-syncope, orthopnea, palpitations, paroxysmal nocturnal dyspnea and syncope.   Respiratory:  Negative for cough and shortness of breath.    Endocrine: Negative for cold intolerance and heat intolerance.   Skin:  Negative for rash.   Musculoskeletal:  Negative for back pain.   Gastrointestinal:  Negative for abdominal pain, constipation, diarrhea, heartburn, melena, nausea and vomiting.   Genitourinary:  Negative for dysuria, flank pain and hematuria.   Neurological:  Negative for dizziness.   Psychiatric/Behavioral:  Negative for altered mental status and depression.          Past Medical History:   Diagnosis Date    Cancer (HCC)     colon    Cough with exposure to COVID-19 virus 06/23/2022    Dry mouth 02/08/2022    H/O colon cancer, stage I 02/02/2021    Hypertension     Neuropathy 02/02/2021    Onychomycosis 06/07/2022    Other hemorrhoids 02/02/2021    Type 2 diabetes mellitus with circulatory disorder, without long-term current use of insulin (HCC) 08/03/2021    Vitamin D deficiency 08/03/2021    Weight loss 02/18/2025         Past Surgical History:   Procedure Laterality Date    OTHER ABDOMINAL SURGERY      colon resection         Current Outpatient Medications   Medication Sig Dispense Refill    carvedilol (COREG) 25 MG Tab TAKE 1 TABLET BY MOUTH TWICE A DAY WITH FOOD 100 Tablet 0     chlorthalidone (HYGROTON) 25 MG Tab TAKE 1 TABLET BY MOUTH EVERY DAY 90 Tablet 0    atorvastatin (LIPITOR) 80 MG tablet Take 1 Tablet by mouth every day. 100 Tablet 0    ezetimibe (ZETIA) 10 MG Tab TAKE 1 TABLET BY MOUTH EVERY  Tablet 3    omeprazole (PRILOSEC) 20 MG delayed-release capsule Take 1 Capsule by mouth every day. 100 Capsule 2    tadalafil (CIALIS) 10 MG tablet Take 1 tablet by mouth 1 time a day as needed for Erectile Dysfunction. 10 Tablet 0    clopidogrel (PLAVIX) 75 MG Tab TAKE 1 TABLET BY MOUTH EVERY  Tablet 2    lisinopril (PRINIVIL) 5 MG Tab Take 1 Tablet by mouth every day. 100 Tablet 2    metFORMIN ER (GLUCOPHAGE XR) 500 MG TABLET SR 24 HR TAKE 1 TABLET BY MOUTH EVERY  Tablet 3    Magnesium Gluconate (MAGNESIUM 27 PO) Take  by mouth 2 times a day. 2 tab every day      Ascorbic Acid (VITAMIN C) 1000 MG Tab Take 1,000 mg by mouth. 2 tabs everyday      Cholecalciferol (VITAMIN D-3 PO) Take  by mouth.      ketoconazole (NIZORAL) 2 % Cream Apply  topically every day.      betamethasone dipropionate (DIPROLENE) 0.05 % Ointment Apply to affected area twice daily as needed 50 g 2    diphenhydrAMINE (BENADRYL) 25 MG Tab Take 1 Each by mouth every 8 hours. 10 Tab 0    EPINEPHrine (EPIPEN) 0.3 MG/0.3ML SOAJ solution for injection 1 Syringe by Injection route Once PRN (for severe allergic reaction) for up to 1 dose. 1 Each 1     No current facility-administered medications for this visit.         Allergies   Allergen Reactions    Peanut-Derived Anaphylaxis    Food      Cereals: Barley, Buckwheat , corn, oat, wheat   Meats: turkey  Fruits: apple, cantaloupe/ muskmelon, grape white , lemon, orange, peach, pear, watermelon.  Vegetables: Bean, green; bean, lima; cabbage, carrot,celery, lettuce, pea, pepper, green; soybean  Seafood: lobster , Oyster, shrimp  Nuts/ seeds: Tuttle, Cashew, coconut, Filbert/Hazelnut,Peanut, Pecan, Pistachio nut,Sesame seed/oil, walnut, flaxseed  Spices/other:  "Chocolate/ cacao bean, cumin, dill, garlic, mustard,pepper,Histamine control      Iodine          No family history on file.      Social History     Socioeconomic History    Marital status:      Spouse name: Not on file    Number of children: Not on file    Years of education: Not on file    Highest education level: Not on file   Occupational History    Not on file   Tobacco Use    Smoking status: Former    Smokeless tobacco: Never    Tobacco comments:     quit 23 yrs ago   Vaping Use    Vaping status: Never Used   Substance and Sexual Activity    Alcohol use: Yes     Alcohol/week: 4.8 oz     Types: 8 Standard drinks or equivalent per week     Comment: daily cocktail (vodka rocks) martini every night    Drug use: No    Sexual activity: Not Currently   Other Topics Concern    Not on file   Social History Narrative    Not on file     Social Drivers of Health     Financial Resource Strain: Low Risk  (3/22/2024)    Overall Financial Resource Strain (CARDIA)     Difficulty of Paying Living Expenses: Not hard at all   Food Insecurity: No Food Insecurity (3/22/2024)    Hunger Vital Sign     Worried About Running Out of Food in the Last Year: Never true     Ran Out of Food in the Last Year: Never true   Transportation Needs: No Transportation Needs (3/22/2024)    PRAPARE - Transportation     Lack of Transportation (Medical): No     Lack of Transportation (Non-Medical): No   Physical Activity: Not on file   Stress: Not on file   Social Connections: Not on file   Intimate Partner Violence: Not on file   Housing Stability: Not on file         Physical Exam:  Ambulatory Vitals  /70 (BP Location: Left arm, Patient Position: Sitting, BP Cuff Size: Adult)   Pulse (!) 52   Ht 1.676 m (5' 6\")   Wt 70.3 kg (155 lb)   SpO2 94%    BP Readings from Last 4 Encounters:   03/28/25 120/70   02/18/25 120/64   03/22/24 124/74   01/29/24 128/72     Weight/BMI:   Vitals:    03/28/25 1453   BP: 120/70   Weight: 70.3 kg (155 lb) " "  Height: 1.676 m (5' 6\")      Body mass index is 25.02 kg/m².  Wt Readings from Last 4 Encounters:   03/28/25 70.3 kg (155 lb)   02/18/25 72.1 kg (158 lb 14.4 oz)   03/22/24 76.1 kg (167 lb 11.2 oz)   01/29/24 78.5 kg (173 lb)       Physical Exam  Constitutional:       General: He is not in acute distress.  HENT:      Head: Normocephalic and atraumatic.   Eyes:      Conjunctiva/sclera: Conjunctivae normal.      Pupils: Pupils are equal, round, and reactive to light.   Neck:      Vascular: No JVD.   Cardiovascular:      Rate and Rhythm: Normal rate and regular rhythm.      Heart sounds: Normal heart sounds. No murmur heard.     No friction rub. No gallop.   Pulmonary:      Effort: Pulmonary effort is normal. No respiratory distress.      Breath sounds: Normal breath sounds. No wheezing or rales.   Chest:      Chest wall: No tenderness.   Abdominal:      General: Bowel sounds are normal. There is no distension.      Palpations: Abdomen is soft.   Musculoskeletal:      Cervical back: Normal range of motion and neck supple.   Skin:     General: Skin is warm and dry.   Neurological:      Mental Status: He is alert and oriented to person, place, and time.   Psychiatric:         Mood and Affect: Affect normal.         Judgment: Judgment normal.           Lab Data Review:  Lab Results   Component Value Date/Time    CHOLSTRLTOT 134 02/16/2023 09:52 AM    LDL 57 02/16/2023 09:52 AM    HDL 64 02/16/2023 09:52 AM    TRIGLYCERIDE 65 02/16/2023 09:52 AM       Lab Results   Component Value Date/Time    SODIUM 142 03/27/2025 12:06 PM    POTASSIUM 4.6 03/27/2025 12:06 PM    CHLORIDE 105 03/27/2025 12:06 PM    CO2 24 03/27/2025 12:06 PM    GLUCOSE 123 (H) 03/27/2025 12:06 PM    BUN 25 (H) 03/27/2025 12:06 PM    CREATININE 1.34 03/27/2025 12:06 PM     Estimated Creatinine Clearance: 38.4 mL/min (by C-G formula based on SCr of 1.34 mg/dL).  Lab Results   Component Value Date/Time    ALKPHOSPHAT 64 02/16/2023 09:52 AM    ASTPETR 25 " "02/16/2023 09:52 AM    ALTSGPT 23 02/16/2023 09:52 AM    TBILIRUBIN 1.2 02/16/2023 09:52 AM      Lab Results   Component Value Date/Time    WBC 5.6 05/17/2021 12:48 PM     Lab Results   Component Value Date/Time    HBA1C 6.2 (A) 02/18/2025 10:58 AM     No components found for: \"TROP\"      Cardiac Imaging and Procedures Review:      EKG 11/2019 sinus, first deg AVB    Calcium score - 1592    Nuclear cindy SPECT 3/20/20  NUCLEAR IMAGING INTERPRETATION   Normal myocardial perfusion with no ischemia.   Normal left ventricular wall motion.  LV ejection fraction = 69%.   ECG INTERPRETATION   Negative stress ECG for ischemia.    TTE 11/2019  CONCLUSIONS  No prior study is available for comparison.   Left ventricular ejection fraction is visually estimated to be greater   than 75%.  Hyperdynamic left ventricular systolic function.  Normal diastolic function.  The right ventricle was normal in size and function.  No significant valve disease or flow abnormalities.   Left Ventricle  Normal left ventricular chamber size. Mild concentric left ventricular   hypertrophy. Hyperdynamic left ventricular systolic function. Left   ventricular ejection fraction is visually estimated to be greater than   75%. Normal regional wall motion. Normal diastolic function.    Abdominal ultrasound 6/2020  Ectasia/small aneurysm of the infrarenal abdominal aorta measuring 2.8 x 2.3 cm    CTA aorta 5/2023  IMPRESSION:  1.  Ectatic ascending aorta measuring 3.8 cm.  2.  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 Mild focal dilatation measuring up to 2.7 cm.  3.  Atherosclerotic changes.  4.  Hepatic and splenic cysts.  5.  No hydronephrosis.  6.  Large hiatal hernia.  7.  Colon diverticula without evidence diverticulitis. No free fluid.    Medical Decision Making:  Problem List Items Addressed This Visit       Hyperlipidemia    Essential hypertension    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    Aortic atherosclerosis (HCC)    " Abdominal aortic aneurysm (AAA) (HCC)    Relevant Orders    US-AORTA/ILIACS DUPLEX COMPLETE         HTN / small infrarenal aortic aneurysm - goal <130/80. Controlled. Continue regimen. Serial imaging.    CAC on CT - continue statin and zetia. Annual lipids. Continue plavix (allergic to aspirin).     It was my pleasure to meet with Mr. Deng.    Today's visit is associated with medical care services that serve as the continuing focal point for all necessary health care services related to the patient's single, serious condition or multiple chronic complex conditions.  This includes providing services to the patient on an ongoing basis that results in care that is collaborative and personalized to the patient.  The services result in a comprehensive, longitudinal, and continuous relationship with the patient and involve delivery of team-based care that is accessible, coordinated with other practitioners and providers, and integrated with the broader health care landscape.

## 2025-04-03 ENCOUNTER — HOSPITAL ENCOUNTER (OUTPATIENT)
Dept: LAB | Facility: MEDICAL CENTER | Age: 85
End: 2025-04-03
Attending: INTERNAL MEDICINE
Payer: MEDICARE

## 2025-04-03 DIAGNOSIS — E11.59 TYPE 2 DIABETES MELLITUS WITH OTHER CIRCULATORY COMPLICATION, WITHOUT LONG-TERM CURRENT USE OF INSULIN (HCC): ICD-10-CM

## 2025-04-03 DIAGNOSIS — E78.49 OTHER HYPERLIPIDEMIA: ICD-10-CM

## 2025-04-03 DIAGNOSIS — G62.0 DRUG-INDUCED POLYNEUROPATHY (HCC): ICD-10-CM

## 2025-04-03 DIAGNOSIS — E55.9 VITAMIN D DEFICIENCY: ICD-10-CM

## 2025-04-03 DIAGNOSIS — R63.4 WEIGHT LOSS: ICD-10-CM

## 2025-04-03 LAB
25(OH)D3 SERPL-MCNC: 118 NG/ML (ref 30–100)
ALBUMIN SERPL BCP-MCNC: 4.2 G/DL (ref 3.2–4.9)
ALBUMIN/GLOB SERPL: 1.7 G/DL
ALP SERPL-CCNC: 69 U/L (ref 30–99)
ALT SERPL-CCNC: 37 U/L (ref 2–50)
ANION GAP SERPL CALC-SCNC: 12 MMOL/L (ref 7–16)
AST SERPL-CCNC: 47 U/L (ref 12–45)
BASOPHILS # BLD AUTO: 0.4 % (ref 0–1.8)
BASOPHILS # BLD: 0.02 K/UL (ref 0–0.12)
BILIRUB SERPL-MCNC: 0.8 MG/DL (ref 0.1–1.5)
BUN SERPL-MCNC: 22 MG/DL (ref 8–22)
CALCIUM ALBUM COR SERPL-MCNC: 9.4 MG/DL (ref 8.5–10.5)
CALCIUM SERPL-MCNC: 9.6 MG/DL (ref 8.5–10.5)
CHLORIDE SERPL-SCNC: 104 MMOL/L (ref 96–112)
CHOLEST SERPL-MCNC: 115 MG/DL (ref 100–199)
CO2 SERPL-SCNC: 23 MMOL/L (ref 20–33)
CREAT SERPL-MCNC: 1.25 MG/DL (ref 0.5–1.4)
CREAT UR-MCNC: 152 MG/DL
EOSINOPHIL # BLD AUTO: 0.09 K/UL (ref 0–0.51)
EOSINOPHIL NFR BLD: 1.9 % (ref 0–6.9)
ERYTHROCYTE [DISTWIDTH] IN BLOOD BY AUTOMATED COUNT: 49.8 FL (ref 35.9–50)
FOLATE SERPL-MCNC: 14.3 NG/ML
GFR SERPLBLD CREATININE-BSD FMLA CKD-EPI: 57 ML/MIN/1.73 M 2
GLOBULIN SER CALC-MCNC: 2.5 G/DL (ref 1.9–3.5)
GLUCOSE SERPL-MCNC: 105 MG/DL (ref 65–99)
HCT VFR BLD AUTO: 44.3 % (ref 42–52)
HDLC SERPL-MCNC: 67 MG/DL
HGB BLD-MCNC: 14.6 G/DL (ref 14–18)
IMM GRANULOCYTES # BLD AUTO: 0.01 K/UL (ref 0–0.11)
IMM GRANULOCYTES NFR BLD AUTO: 0.2 % (ref 0–0.9)
LDLC SERPL CALC-MCNC: 36 MG/DL
LYMPHOCYTES # BLD AUTO: 1.06 K/UL (ref 1–4.8)
LYMPHOCYTES NFR BLD: 22 % (ref 22–41)
MCH RBC QN AUTO: 33.4 PG (ref 27–33)
MCHC RBC AUTO-ENTMCNC: 33 G/DL (ref 32.3–36.5)
MCV RBC AUTO: 101.4 FL (ref 81.4–97.8)
MICROALBUMIN UR-MCNC: <1.2 MG/DL
MICROALBUMIN/CREAT UR: NORMAL MG/G (ref 0–30)
MONOCYTES # BLD AUTO: 0.54 K/UL (ref 0–0.85)
MONOCYTES NFR BLD AUTO: 11.2 % (ref 0–13.4)
NEUTROPHILS # BLD AUTO: 3.1 K/UL (ref 1.82–7.42)
NEUTROPHILS NFR BLD: 64.3 % (ref 44–72)
NRBC # BLD AUTO: 0 K/UL
NRBC BLD-RTO: 0 /100 WBC (ref 0–0.2)
PLATELET # BLD AUTO: 151 K/UL (ref 164–446)
PMV BLD AUTO: 10.2 FL (ref 9–12.9)
POTASSIUM SERPL-SCNC: 4.7 MMOL/L (ref 3.6–5.5)
PROT SERPL-MCNC: 6.7 G/DL (ref 6–8.2)
RBC # BLD AUTO: 4.37 M/UL (ref 4.7–6.1)
SODIUM SERPL-SCNC: 139 MMOL/L (ref 135–145)
TRIGL SERPL-MCNC: 62 MG/DL (ref 0–149)
TSH SERPL DL<=0.005 MIU/L-ACNC: 2.31 UIU/ML (ref 0.38–5.33)
VIT B12 SERPL-MCNC: 225 PG/ML (ref 211–911)
WBC # BLD AUTO: 4.8 K/UL (ref 4.8–10.8)

## 2025-04-03 PROCEDURE — 82306 VITAMIN D 25 HYDROXY: CPT

## 2025-04-03 PROCEDURE — 36415 COLL VENOUS BLD VENIPUNCTURE: CPT

## 2025-04-03 PROCEDURE — 82607 VITAMIN B-12: CPT

## 2025-04-03 PROCEDURE — 80061 LIPID PANEL: CPT

## 2025-04-03 PROCEDURE — 82043 UR ALBUMIN QUANTITATIVE: CPT

## 2025-04-03 PROCEDURE — 82570 ASSAY OF URINE CREATININE: CPT

## 2025-04-03 PROCEDURE — 82746 ASSAY OF FOLIC ACID SERUM: CPT

## 2025-04-03 PROCEDURE — 85025 COMPLETE CBC W/AUTO DIFF WBC: CPT

## 2025-04-03 PROCEDURE — 80053 COMPREHEN METABOLIC PANEL: CPT

## 2025-04-03 PROCEDURE — 84443 ASSAY THYROID STIM HORMONE: CPT

## 2025-04-04 ENCOUNTER — OFFICE VISIT (OUTPATIENT)
Dept: MEDICAL GROUP | Facility: PHYSICIAN GROUP | Age: 85
End: 2025-04-04
Payer: MEDICARE

## 2025-04-04 VITALS
SYSTOLIC BLOOD PRESSURE: 118 MMHG | HEART RATE: 51 BPM | TEMPERATURE: 97.9 F | BODY MASS INDEX: 24.81 KG/M2 | DIASTOLIC BLOOD PRESSURE: 78 MMHG | RESPIRATION RATE: 16 BRPM | OXYGEN SATURATION: 100 % | HEIGHT: 66 IN | WEIGHT: 154.4 LBS

## 2025-04-04 DIAGNOSIS — Z85.038 H/O COLON CANCER, STAGE I: ICD-10-CM

## 2025-04-04 DIAGNOSIS — E11.59 TYPE 2 DIABETES MELLITUS WITH OTHER CIRCULATORY COMPLICATION, WITHOUT LONG-TERM CURRENT USE OF INSULIN (HCC): ICD-10-CM

## 2025-04-04 DIAGNOSIS — R63.4 WEIGHT LOSS, UNINTENTIONAL: ICD-10-CM

## 2025-04-04 DIAGNOSIS — I71.40 ABDOMINAL AORTIC ANEURYSM (AAA), UNSPECIFIED PART, UNSPECIFIED WHETHER RUPTURED (HCC): ICD-10-CM

## 2025-04-04 DIAGNOSIS — N52.8 OTHER MALE ERECTILE DYSFUNCTION: ICD-10-CM

## 2025-04-04 DIAGNOSIS — E55.9 VITAMIN D DEFICIENCY: ICD-10-CM

## 2025-04-04 PROCEDURE — 3078F DIAST BP <80 MM HG: CPT | Performed by: INTERNAL MEDICINE

## 2025-04-04 PROCEDURE — RXMED WILLOW AMBULATORY MEDICATION CHARGE: Performed by: INTERNAL MEDICINE

## 2025-04-04 PROCEDURE — 3074F SYST BP LT 130 MM HG: CPT | Performed by: INTERNAL MEDICINE

## 2025-04-04 PROCEDURE — 99214 OFFICE O/P EST MOD 30 MIN: CPT | Performed by: INTERNAL MEDICINE

## 2025-04-04 RX ORDER — TADALAFIL 20 MG/1
20 TABLET ORAL PRN
Qty: 10 TABLET | Refills: 3 | Status: SHIPPED | OUTPATIENT
Start: 2025-04-04

## 2025-04-04 ASSESSMENT — FIBROSIS 4 INDEX: FIB4 SCORE: 4.25

## 2025-04-04 NOTE — PROGRESS NOTES
CC: Follow-up lab work, medications, weight loss    HPI:  Morris presents with the following    1. Abdominal aortic aneurysm (AAA), unspecified part, unspecified whether ruptured (HCC)  2/18/2025:Chronic. Stable. Followed by cardiology, last seen January 2024. Most recent imaging was CT-CTA which showed ascending aorta measuring 3.8 cm. 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 Patient's blood pressure has been well-controlled with Coreg. Patient taking high-dose Lipitor 80 mg daily, Zetia 10 mg daily. Patient has an upcoming appointment with cardiology on March 28.     4/4/25: Patient follow-up with cardiology on March 28.  Continue current plan of care with monitoring blood pressure readings and treating cholesterol with statin and Zetia.  Patient is scheduled to follow-up ultrasound aorta/iliacs duplex complete.    2. Type 2 diabetes mellitus with other circulatory complication, without long-term current use of insulin (HCC)  Chronic.  Stable.  Patient takes metformin 500 mg tablets daily.  Does not check his blood sugars regularly.  Hemoglobin A1c 6.2 today.     3. Weight loss, unintentional  2/18/25:Noted 9 pound weight loss in 1 year. Patient states that he is currently living at the [facility] and has been walking quite a bit. Patient does not have any complaints. Eating well.     4/4/25: In reviewing patient's chart.  Noted his weight at 173 pounds in January 20, 2024.  Patient has steadily decreased weight since that time.  Appetite is good.  Walks quite a bit at his assisted living facility.  Patient has been having a bit of loose/watery stools over the last several weeks otherwise asymptomatic.  TSH 2.3.    4. H/O colon cancer, stage I  Diagnosed with colon cancer of the cecum in 1998, status post colon resection. Patient completed 6 months of chemotherapy.  Last colonoscopy completed 2017.  Follow-up colonoscopy recommended for 2022.  Patient was seen by [facility].  Patient has not  had a follow-up colonoscopy.     5. Other male erectile dysfunction  Patient would like to increase his Cialis to 20 mg dose as his 10 mg has not been very effective.  Using as needed.    6. Vitamin D deficiency  Current vitamin D level 118.  Taking 2 capsules of his vitamin D supplement.  Unknown dose.      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    Onychomycosis 06/07/2022    Hiatal hernia 05/31/2022    Drug-induced polyneuropathy ([facility]) 05/31/2022    Overweight with body mass index (BMI) of 27 to 27.9 in adult 05/31/2022    Dry mouth 02/08/2022    Type 2 diabetes mellitus with circulatory disorder, without long-term current use of insulin ([facility]) 08/03/2021    Vitamin D deficiency 08/03/2021    Other hemorrhoids 02/02/2021    H/O colon cancer, stage I 02/02/2021    Hyperlipidemia 11/07/2019    Essential hypertension 11/07/2019    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 100 Tablet 0    ezetimibe (ZETIA) 10 MG Tab TAKE 1 TABLET BY MOUTH EVERY  Tablet 3    omeprazole (PRILOSEC) 20 MG delayed-release capsule Take 1 Capsule by mouth every day. 100 Capsule 2    tadalafil (CIALIS) 10 MG tablet Take 1 tablet by mouth 1 time a day as needed for Erectile Dysfunction. 10 Tablet 0    clopidogrel (PLAVIX) 75 MG Tab TAKE 1 TABLET BY MOUTH EVERY  Tablet 2    lisinopril (PRINIVIL) 5  MG Tab Take 1 Tablet by mouth every day. 100 Tablet 2    metFORMIN ER (GLUCOPHAGE XR) 500 MG TABLET SR 24 HR TAKE 1 TABLET BY MOUTH EVERY  Tablet 3    Magnesium Gluconate (MAGNESIUM 27 PO) Take  by mouth 2 times a day. 2 tab every day      Ascorbic Acid (VITAMIN C) 1000 MG Tab Take 1,000 mg by mouth. 2 tabs everyday      Cholecalciferol (VITAMIN D-3 PO) Take  by mouth.      ketoconazole (NIZORAL) 2 % Cream Apply  topically every day.      betamethasone dipropionate (DIPROLENE) 0.05 % Ointment Apply to affected area twice daily as needed 50 g 2    diphenhydrAMINE (BENADRYL) 25 MG Tab Take 1 Each by mouth every 8 hours. 10 Tab 0    EPINEPHrine (EPIPEN) 0.3 MG/0.3ML SOAJ solution for injection 1 Syringe by Injection route Once PRN (for severe allergic reaction) for up to 1 dose. 1 Each 1     No current facility-administered medications for this visit.         Allergies as of 04/04/2025 - Reviewed 04/04/2025   Allergen Reaction Noted    Peanut-derived Anaphylaxis 12/01/2014    Food  02/13/2016    Iodine  12/03/2009        Social History     Socioeconomic History    Marital status:      Spouse name: Not on file    Number of children: Not on file    Years of education: Not on file    Highest education level: Not on file   Occupational History    Not on file   Tobacco Use    Smoking status: Former    Smokeless tobacco: Never    Tobacco comments:     quit 23 yrs ago   Vaping Use    Vaping status: Never Used   Substance and Sexual Activity    Alcohol use: Yes     Alcohol/week: 4.8 oz     Types: 8 Standard drinks or equivalent per week     Comment: daily cocktail (vodka rocks) martini every night    Drug use: No    Sexual activity: Not Currently   Other Topics Concern    Not on file   Social History Narrative    Not on file     Social Drivers of Health     Financial Resource Strain: Low Risk  (3/22/2024)    Overall Financial Resource Strain (CARDIA)     Difficulty of Paying Living Expenses: Not hard at all  "  Food Insecurity: No Food Insecurity (3/22/2024)    Hunger Vital Sign     Worried About Running Out of Food in the Last Year: Never true     Ran Out of Food in the Last Year: Never true   Transportation Needs: No Transportation Needs (3/22/2024)    PRAPARE - Transportation     Lack of Transportation (Medical): No     Lack of Transportation (Non-Medical): No   Physical Activity: Not on file   Stress: Not on file   Social Connections: Not on file   Intimate Partner Violence: Not on file   Housing Stability: Not on file       History reviewed. No pertinent family history.    Past Surgical History:   Procedure Laterality Date    OTHER ABDOMINAL SURGERY      colon resection       ROS:  Denies any Headache,Chest pain,  Shortness of breath,  Abdominal pain, Changes of bowel or bladder, Lower ext edema, Fevers, Nights sweats, Weight Changes, Focal weakness or numbness.  All other systems are negative.    /78 (BP Location: Left arm, Patient Position: Sitting)   Pulse (!) 51   Temp 36.6 °C (97.9 °F) (Temporal)   Resp 16   Ht 1.676 m (5' 6\")   Wt 70 kg (154 lb 6.4 oz)   SpO2 100%   BMI 24.92 kg/m²      Constitutional: Alert, no distress, well-groomed.  Skin: Warm, dry, good turgor, no rashes in visible areas.  Eye: Equal, round and reactive, conjunctiva clear, lids normal.  ENMT: Lips without lesions, good dentition, moist mucous membranes.  Neck: Trachea midline, no masses, no thyromegaly.  Respiratory: Unlabored respiratory effort, no cough.  Abdomen: Soft, no gross masses.  MSK: Normal gait, moves all extremities.  Neuro: Grossly non-focal. No cranial nerve deficit. Strength and sensation intact.   Psych: Alert and oriented x3, normal affect and mood.      Assessment and Plan.   85 y.o. male presenting with the following.     1. Abdominal aortic aneurysm (AAA), unspecified part, unspecified whether ruptured (HCC)  Chronic.  Stable.  Complete ultrasound of the aorta and follow-up with cardiology.  Continue " current plan of care.  Medications reviewed.    2. Type 2 diabetes mellitus with other circulatory complication, without long-term current use of insulin (HCC)  Chronic.  Stable.  Continue metformin 500 mg tablets daily.  A1c 6.2.    3. Weight loss, unintentional    - CEA; Future  - Cologuard® colon cancer screening  - Sed Rate; Future  - CRP QUANTITIVE (NON-CARDIAC); Future  - PROSTATE SPECIFIC AG SCREENING; Future  - URINALYSIS,CULTURE IF INDICATED; Future    4. H/O colon cancer, stage I    - CEA; Future  - Cologuard® colon cancer screening    5. Other male erectile dysfunction  Increase Cialis to 20 mg as needed.    6. Vitamin D deficiency  Patient will decrease his vitamin D3 supplementation by half.

## 2025-04-07 ENCOUNTER — PHARMACY VISIT (OUTPATIENT)
Dept: PHARMACY | Facility: MEDICAL CENTER | Age: 85
End: 2025-04-07
Payer: COMMERCIAL

## 2025-04-11 RX ORDER — METFORMIN HYDROCHLORIDE 500 MG/1
TABLET, EXTENDED RELEASE ORAL
Qty: 100 TABLET | Refills: 3 | Status: SHIPPED | OUTPATIENT
Start: 2025-04-11

## 2025-04-11 NOTE — TELEPHONE ENCOUNTER
Received request via: Pharmacy    Was the patient seen in the last year in this department? Yes    Does the patient have an active prescription (recently filled or refills available) for medication(s) requested? No    [facility]    Does the patient have snf Plus and need 100-day supply? (This applies to ALL medications) Yes, quantity updated to 100 days

## 2025-04-23 ENCOUNTER — RESULTS FOLLOW-UP (OUTPATIENT)
Dept: CARDIOLOGY | Facility: MEDICAL CENTER | Age: 85
End: 2025-04-23
Payer: MEDICARE

## 2025-04-23 ENCOUNTER — HOSPITAL ENCOUNTER (OUTPATIENT)
Dept: RADIOLOGY | Facility: MEDICAL CENTER | Age: 85
End: 2025-04-23
Attending: INTERNAL MEDICINE
Payer: MEDICARE

## 2025-04-23 DIAGNOSIS — I71.40 ABDOMINAL AORTIC ANEURYSM (AAA), UNSPECIFIED PART, UNSPECIFIED WHETHER RUPTURED (HCC): ICD-10-CM

## 2025-04-23 PROCEDURE — 93978 VASCULAR STUDY: CPT

## 2025-04-28 RX ORDER — ATORVASTATIN CALCIUM 80 MG/1
80 TABLET, FILM COATED ORAL
Qty: 100 TABLET | Refills: 1 | Status: SHIPPED | OUTPATIENT
Start: 2025-04-28

## 2025-05-01 DIAGNOSIS — I10 ESSENTIAL HYPERTENSION: ICD-10-CM

## 2025-05-01 RX ORDER — CARVEDILOL 25 MG/1
25 TABLET ORAL 2 TIMES DAILY WITH MEALS
Qty: 200 TABLET | Refills: 3 | Status: SHIPPED | OUTPATIENT
Start: 2025-05-01

## 2025-05-01 NOTE — TELEPHONE ENCOUNTER
Is the patient due for a refill? Yes    Was the patient seen the last 15 months? Yes    Date of last office visit: 3/28/25    Does the patient have an upcoming appointment?  No   If yes, When?     Provider to refill:VR    Does the patient have [facility] Plus and need 100-day supply? (This applies to ALL medications) Yes, quantity updated to 100 days

## 2025-05-12 DIAGNOSIS — I10 ESSENTIAL HYPERTENSION: ICD-10-CM

## 2025-05-12 PROCEDURE — RXMED WILLOW AMBULATORY MEDICATION CHARGE: Performed by: INTERNAL MEDICINE

## 2025-05-12 RX ORDER — LISINOPRIL 5 MG/1
5 TABLET ORAL DAILY
Qty: 100 TABLET | Refills: 2 | Status: SHIPPED | OUTPATIENT
Start: 2025-05-12

## 2025-05-12 NOTE — PROGRESS NOTES
[facility] [facility]    Pt has seen a [facility] provider in past 12 months and in need of prescription refills per protocol.  A 100 day supply is provided whenever possible to improve adherence rates.     Lab Results   Component Value Date/Time    HBA1C 6.2 (A) 02/18/2025 10:58 AM      Lab Results   Component Value Date/Time    MALBCRT see below 04/03/2025 09:57 AM    MICROALBUR <1.2 04/03/2025 09:57 AM      Lab Results   Component Value Date/Time    ALKPHOSPHAT 69 04/03/2025 09:57 AM    ASTSGOT 47 (H) 04/03/2025 09:57 AM    ALTSGPT 37 04/03/2025 09:57 AM    TBILIRUBIN 0.8 04/03/2025 09:57 AM      Lab Results   Component Value Date/Time    SODIUM 139 04/03/2025 09:57 AM    POTASSIUM 4.7 04/03/2025 09:57 AM    CHLORIDE 104 04/03/2025 09:57 AM    CO2 23 04/03/2025 09:57 AM    GLUCOSE 105 (H) 04/03/2025 09:57 AM    BUN 22 04/03/2025 09:57 AM    CREATININE 1.25 04/03/2025 09:57 AM        Meds refilled:  Lisinopril    Pt opts for automatic mail order.     Jacek Berman, MónicaD

## 2025-05-13 ENCOUNTER — PHARMACY VISIT (OUTPATIENT)
Dept: PHARMACY | Facility: MEDICAL CENTER | Age: 85
End: 2025-05-13
Payer: COMMERCIAL

## 2025-05-21 ENCOUNTER — HOSPITAL ENCOUNTER (OUTPATIENT)
Dept: RADIOLOGY | Facility: MEDICAL CENTER | Age: 85
End: 2025-05-21
Attending: PSYCHIATRY & NEUROLOGY
Payer: MEDICARE

## 2025-05-21 DIAGNOSIS — G60.3 IDIOPATHIC PROGRESSIVE POLYNEUROPATHY: ICD-10-CM

## 2025-05-21 PROCEDURE — 72100 X-RAY EXAM L-S SPINE 2/3 VWS: CPT

## 2025-05-21 PROCEDURE — 72170 X-RAY EXAM OF PELVIS: CPT

## 2025-05-28 ENCOUNTER — HOSPITAL ENCOUNTER (OUTPATIENT)
Dept: LAB | Facility: MEDICAL CENTER | Age: 85
End: 2025-05-28
Attending: PSYCHIATRY & NEUROLOGY
Payer: MEDICARE

## 2025-05-28 LAB
CK SERPL-CCNC: 189 U/L (ref 0–154)
CRP SERPL HS-MCNC: <0.3 MG/DL (ref 0–0.75)
ERYTHROCYTE [SEDIMENTATION RATE] IN BLOOD BY WESTERGREN METHOD: 7 MM/HOUR (ref 0–20)

## 2025-05-28 PROCEDURE — 82784 ASSAY IGA/IGD/IGG/IGM EACH: CPT

## 2025-05-28 PROCEDURE — 86334 IMMUNOFIX E-PHORESIS SERUM: CPT

## 2025-05-28 PROCEDURE — 82085 ASSAY OF ALDOLASE: CPT

## 2025-05-28 PROCEDURE — 84155 ASSAY OF PROTEIN SERUM: CPT

## 2025-05-28 PROCEDURE — 85652 RBC SED RATE AUTOMATED: CPT

## 2025-05-28 PROCEDURE — 82550 ASSAY OF CK (CPK): CPT

## 2025-05-28 PROCEDURE — 36415 COLL VENOUS BLD VENIPUNCTURE: CPT

## 2025-05-28 PROCEDURE — 86140 C-REACTIVE PROTEIN: CPT

## 2025-05-28 PROCEDURE — 84165 PROTEIN E-PHORESIS SERUM: CPT

## 2025-05-28 PROCEDURE — 86042 ACETYLCHOLN RCPTR BLCKG ANTB: CPT

## 2025-05-28 PROCEDURE — 86041 ACETYLCHOLN RCPTR BNDNG ANTB: CPT

## 2025-05-30 ENCOUNTER — APPOINTMENT (OUTPATIENT)
Dept: MEDICAL GROUP | Facility: PHYSICIAN GROUP | Age: 85
End: 2025-05-30
Payer: MEDICARE

## 2025-05-30 LAB
ACHR BIND AB SER-SCNC: 0 NMOL/L (ref 0–0.4)
ACHR BLOCK AB/ACHR TOTAL SFR SER: 15 % (ref 0–26)
ALDOLASE SERPL-CCNC: 4 U/L (ref 1.2–7.6)
IGA SERPL-MCNC: 170 MG/DL (ref 68–408)
IGG SERPL-MCNC: 831 MG/DL (ref 768–1632)
IGM SERPL-MCNC: 185 MG/DL (ref 35–263)

## 2025-05-31 LAB
ALBUMIN SERPL ELPH-MCNC: 4.02 G/DL (ref 3.75–5.01)
ALPHA1 GLOB SERPL ELPH-MCNC: 0.21 G/DL (ref 0.19–0.46)
ALPHA2 GLOB SERPL ELPH-MCNC: 0.66 G/DL (ref 0.48–1.05)
B-GLOBULIN SERPL ELPH-MCNC: 0.7 G/DL (ref 0.48–1.1)
GAMMA GLOB SERPL ELPH-MCNC: 0.91 G/DL (ref 0.62–1.51)
INTERPRETATION SERPL IFE-IMP: NORMAL
INTERPRETATION SERPL IFE-IMP: NORMAL
MONOCLONAL PROTEIN NL11656: NORMAL G/DL
PATHOLOGY STUDY: NORMAL
PROT SERPL-MCNC: 6.5 G/DL (ref 6.3–8.2)

## 2025-06-06 LAB — NONINV COLON CA DNA+OCC BLD SCRN STL QL: NEGATIVE

## 2025-06-24 ENCOUNTER — APPOINTMENT (OUTPATIENT)
Dept: MEDICAL GROUP | Facility: PHYSICIAN GROUP | Age: 85
End: 2025-06-24
Payer: MEDICARE

## 2025-06-24 VITALS
RESPIRATION RATE: 16 BRPM | OXYGEN SATURATION: 96 % | TEMPERATURE: 97.2 F | WEIGHT: 153 LBS | HEART RATE: 60 BPM | DIASTOLIC BLOOD PRESSURE: 62 MMHG | BODY MASS INDEX: 24.59 KG/M2 | SYSTOLIC BLOOD PRESSURE: 118 MMHG | HEIGHT: 66 IN

## 2025-06-24 DIAGNOSIS — N18.31 CHRONIC KIDNEY DISEASE, STAGE 3A: ICD-10-CM

## 2025-06-24 DIAGNOSIS — R63.4 WEIGHT LOSS, UNINTENTIONAL: ICD-10-CM

## 2025-06-24 DIAGNOSIS — E11.59 TYPE 2 DIABETES MELLITUS WITH OTHER CIRCULATORY COMPLICATION, WITHOUT LONG-TERM CURRENT USE OF INSULIN (HCC): ICD-10-CM

## 2025-06-24 DIAGNOSIS — Z85.038 H/O COLON CANCER, STAGE I: ICD-10-CM

## 2025-06-24 DIAGNOSIS — E53.8 VITAMIN B12 DEFICIENCY: ICD-10-CM

## 2025-06-24 DIAGNOSIS — R29.6 MULTIPLE FALLS: Primary | ICD-10-CM

## 2025-06-24 PROCEDURE — 99214 OFFICE O/P EST MOD 30 MIN: CPT | Performed by: INTERNAL MEDICINE

## 2025-06-24 PROCEDURE — 3078F DIAST BP <80 MM HG: CPT | Performed by: INTERNAL MEDICINE

## 2025-06-24 PROCEDURE — 3074F SYST BP LT 130 MM HG: CPT | Performed by: INTERNAL MEDICINE

## 2025-06-24 RX ORDER — BIOTIN 10 MG
3000 TABLET ORAL 2 TIMES DAILY
COMMUNITY

## 2025-06-24 ASSESSMENT — FIBROSIS 4 INDEX: FIB4 SCORE: 4.25

## 2025-06-26 ENCOUNTER — APPOINTMENT (OUTPATIENT)
Dept: MEDICAL GROUP | Facility: PHYSICIAN GROUP | Age: 85
End: 2025-06-26
Payer: MEDICARE

## 2025-07-02 ENCOUNTER — TELEPHONE (OUTPATIENT)
Dept: CARDIOLOGY | Facility: MEDICAL CENTER | Age: 85
End: 2025-07-02
Payer: MEDICARE

## 2025-07-02 DIAGNOSIS — I10 ESSENTIAL HYPERTENSION: Primary | ICD-10-CM

## 2025-07-02 NOTE — TELEPHONE ENCOUNTER
VR    Caller: Rosie Parson    Topic/issue: The patient had a presented with a heart rate between 46-49 BPM at his first 2 PT appointments and the provider wanted to bring that to his cardio team's attention. The most recent visit with 7-2-25.    Callback Number: [phone] (patient)    Thank you,  Eduardo POLK

## 2025-07-03 DIAGNOSIS — I10 ESSENTIAL HYPERTENSION: ICD-10-CM

## 2025-07-03 RX ORDER — CHLORTHALIDONE 25 MG/1
25 TABLET ORAL DAILY
Qty: 90 TABLET | Refills: 2 | Status: SHIPPED | OUTPATIENT
Start: 2025-07-03

## 2025-07-03 NOTE — TELEPHONE ENCOUNTER
Phone Number Called: [phone]     Call outcome: Spoke to patient regarding message below.    Message: Called to inform patient of phone call from Rosie from [facility]. Patient reports that his heart rate has been running in the 40s-50s lately. His blood pressure cuff is not working due to not having batteries, but he is aware of how to check his pulse. He confirmed that he is taking Carvedilol 25mg BID. RN did advise to hold Carvedilol if heart rate is less than 60 or BP is less than 100/60. ER precautions also advised. Patient verbalized understanding. RN to reach out to [facility] for further recommendations.

## 2025-07-04 NOTE — TELEPHONE ENCOUNTER
Phone Number Called: [phone]     Call outcome: Did not leave a detailed message. Requested patient to call back.    Message: Called to inform patient of [facility] recommendations. Unable to reach. Left VM for call back.     --------------  Rhina Lozano D.N.P. to Me  (Selected Message)  LB      7/3/25  4:52 PM  Holding parameters are fine. Could try 12.5mg BID. Needs to get an updated cuff for more accurate results.      Er precautions please. And he can come in with TONEY if no appt with VR

## 2025-07-07 PROCEDURE — RXMED WILLOW AMBULATORY MEDICATION CHARGE: Performed by: NURSE PRACTITIONER

## 2025-07-07 RX ORDER — CARVEDILOL 12.5 MG/1
12.5 TABLET ORAL 2 TIMES DAILY WITH MEALS
Qty: 180 TABLET | Refills: 0 | Status: SHIPPED | OUTPATIENT
Start: 2025-07-07

## 2025-07-07 NOTE — TELEPHONE ENCOUNTER
Call placed to patient to discuss blood pressure and heart rate as well as recommendations from [facility] regarding carvedilol, patient does not recall receiving voicemail. Discussed trying to take half dose of carvedilol per LB, patient wanting to know if he can just take carvedilol once daily, advised this was not recommended, should take 12.5mg twice daily. Patient verbalized understanding. Would like to schedule an appointment for next week, scheduled appointment with KENNY.

## 2025-07-08 ENCOUNTER — OFFICE VISIT (OUTPATIENT)
Dept: MEDICAL GROUP | Facility: PHYSICIAN GROUP | Age: 85
End: 2025-07-08
Payer: MEDICARE

## 2025-07-08 VITALS
DIASTOLIC BLOOD PRESSURE: 62 MMHG | SYSTOLIC BLOOD PRESSURE: 118 MMHG | BODY MASS INDEX: 24.35 KG/M2 | RESPIRATION RATE: 14 BRPM | OXYGEN SATURATION: 94 % | HEIGHT: 66 IN | HEART RATE: 54 BPM | WEIGHT: 151.5 LBS | TEMPERATURE: 98.5 F

## 2025-07-08 DIAGNOSIS — R29.6 MULTIPLE FALLS: Primary | ICD-10-CM

## 2025-07-08 DIAGNOSIS — R29.898 WEAKNESS OF LOWER EXTREMITY, UNSPECIFIED LATERALITY: ICD-10-CM

## 2025-07-08 PROCEDURE — 3074F SYST BP LT 130 MM HG: CPT | Performed by: INTERNAL MEDICINE

## 2025-07-08 PROCEDURE — 99213 OFFICE O/P EST LOW 20 MIN: CPT | Performed by: INTERNAL MEDICINE

## 2025-07-08 PROCEDURE — 3078F DIAST BP <80 MM HG: CPT | Performed by: INTERNAL MEDICINE

## 2025-07-08 ASSESSMENT — FIBROSIS 4 INDEX: FIB4 SCORE: 4.25

## 2025-07-08 NOTE — PROGRESS NOTES
CC: 3 recent falls.    HPI:  Morris presents with the following    1. Multiple falls  Patient with a history of drug-induced polyneuropathy presents to clinic with a history of 3 recent falls.  He currently resides at the [address] [place] [address] living and over the course of the last few weeks he experienced falls, using his four-wheel walker.  He states that his knees just buckled and he fell down.  He has been using a walker for at least the last 4 to 5 years however has been also using a wheelchair more often.  When he fell he did not experience any loss of consciousness but he had difficulty getting up due to weakness.  Patient reached out to a friend, neurologist Dr. Mosley who initiated a workup to include x-rays of his lumbar spine and bilateral hips.  Labs also ordered to include a sed rate 7, CPK slightly elevated at 189, aldolase 4.0, immunochemistry is within normal limits, C-reactive protein within normal limits, autoimmune workup, electrophoresis negative.  Vitamin B12 low at 225.  Lumbar x-rays shows degenerative joint disease, multilevel.  Unremarkable x-rays of the pelvis.  Patient started vitamin B12 supplementation.    Physical therapy was recommended.  Was also noted that patient has not had any breaks on his four-wheel walker for about 2 years and could be contributing to his multiple falls.  Requesting an order for a new four-wheel walker with a seat.  Patient has had his current walker for the last 7-8 years.    2. Vitamin B12 deficiency  Vitamin B12 level low at 225.    3. Weight loss, unintentional  Patient has lost 3 pounds since his last visit.  Patient is having trouble going down to the cafeteria and most likely not eating for meals.    4. Chronic kidney disease, stage 3a  Stable.  GFR 57.  BUN and creatinine within normal limits.    5. H/O colon cancer, stage I  Recent Cologuard negative from May 30, 2025.    6. Type 2 diabetes mellitus with other circulatory complication, without  long-term current use of insulin ([facility])  Chronic.  Stable.  Hemoglobin A1c 6.2.  Patient taking Glucophage 500 mg daily.      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    Onychomycosis 06/07/2022    Hiatal hernia 05/31/2022    Drug-induced polyneuropathy ([facility]) 05/31/2022    Overweight with body mass index (BMI) of 27 to 27.9 in adult 05/31/2022    Dry mouth 02/08/2022    Type 2 diabetes mellitus with circulatory disorder, without long-term current use of insulin ([facility]) 08/03/2021    Vitamin D deficiency 08/03/2021    Other hemorrhoids 02/02/2021    H/O colon cancer, stage I 02/02/2021    Hyperlipidemia 11/07/2019    Essential hypertension 11/07/2019    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 11/07/2019    Angioedema 02/13/2016       Current Medications[1]      Allergies as of 06/24/2025 - Reviewed 06/24/2025   Allergen Reaction Noted    Peanut-derived Anaphylaxis 12/01/2014    Food  02/13/2016    Iodine  12/03/2009        Social History     Socioeconomic History    Marital status:      Spouse name: Not on file    Number of children: Not on file    Years of education: Not on file    Highest education level: Not on file   Occupational History    Not on file   Tobacco Use    Smoking status: Former    Smokeless tobacco: Never    Tobacco comments:     quit 23 yrs ago   Vaping Use    Vaping status: Never Used   Substance and Sexual Activity    Alcohol use: Yes     Alcohol/week: 4.8 oz     Types: 8 Standard drinks or equivalent per week     Comment: daily cocktail (vodka rocks) martini every night    Drug use: No    Sexual activity: Not Currently   Other Topics Concern    Not on file   Social History  "Narrative    Not on file     Social Drivers of Health     Financial Resource Strain: Low Risk  (3/22/2024)    Overall Financial Resource Strain (CARDIA)     Difficulty of Paying Living Expenses: Not hard at all   Food Insecurity: No Food Insecurity (3/22/2024)    Hunger Vital Sign     Worried About Running Out of Food in the Last Year: Never true     Ran Out of Food in the Last Year: Never true   Transportation Needs: No Transportation Needs (3/22/2024)    PRAPARE - Transportation     Lack of Transportation (Medical): No     Lack of Transportation (Non-Medical): No   Physical Activity: Not on file   Stress: Not on file   Social Connections: Not on file   Intimate Partner Violence: Not on file   Housing Stability: Not on file       History reviewed. No pertinent family history.    Past Surgical History[2]    ROS: Positive ROS per HPI.  Denies any Headache,Chest pain,  Shortness of breath,  Abdominal pain, Changes of bowel or bladder, Lower ext edema, Fevers, Nights sweats, Weight Changes, Focal weakness or numbness.  All other systems are negative.    /62 (BP Location: Left arm, Patient Position: Sitting)   Pulse 60   Temp 36.2 °C (97.2 °F) (Temporal)   Resp 16   Ht 1.676 m (5' 6\")   Wt 69.4 kg (153 lb)   SpO2 96%   BMI 24.69 kg/m²      Constitutional: Alert, no distress, well-groomed.  Skin: Warm, dry, good turgor, no rashes in visible areas.  Eye: Equal, round and reactive, conjunctiva clear, lids normal.  ENMT: Lips without lesions, good dentition, moist mucous membranes.  Neck: Trachea midline, no masses, no thyromegaly.  Respiratory: Unlabored respiratory effort, no cough.  Abdomen: Soft, no gross masses.  MSK: Normal gait, moves all extremities.  Neuro: Grossly non-focal. No cranial nerve deficit. Strength and sensation intact.   Psych: Alert and oriented x3, normal affect and mood.      Assessment and Plan.   85 y.o. male presenting with the following.     1. Multiple falls (Primary)  Initial " workup per neurology reviewed with patient, imaging reviewed.  Patient started vitamin B12 supplementation.  Patient is to continue and complete physical therapy.    2. Vitamin B12 deficiency  Continue with vitamin B12 supplementation.    3. Weight loss, unintentional  Continue to monitor.    4. Chronic kidney disease, stage 3a  Continue to monitor.  Keep well-hydrated.  Avoid nephrotoxins.    5. H/O colon cancer, stage I  Stable    6. Type 2 diabetes mellitus with other circulatory complication, without long-term current use of insulin (HCC)  Chronic.  Stable.  Continue current dose of metformin.         [1]   Current Outpatient Medications   Medication Sig Dispense Refill    Cyanocobalamin (VITAMIN B-12) 3000 MCG SL Tab Place 3,000 mcg under the tongue 2 times a day.      lisinopril (PRINIVIL) 5 MG Tab Take 1 Tablet by mouth every day. 100 Tablet 2    atorvastatin (LIPITOR) 80 MG tablet TAKE 1 TABLET BY MOUTH EVERY  Tablet 1    metFORMIN ER (GLUCOPHAGE XR) 500 MG TABLET SR 24 HR TAKE 1 TABLET BY MOUTH EVERY  Tablet 3    tadalafil 20 MG tablet Take 1 Tablet by mouth as needed for Erectile Dysfunction. 10 Tablet 3    ezetimibe (ZETIA) 10 MG Tab TAKE 1 TABLET BY MOUTH EVERY  Tablet 3    omeprazole (PRILOSEC) 20 MG delayed-release capsule Take 1 Capsule by mouth every day. 100 Capsule 2    tadalafil (CIALIS) 10 MG tablet Take 1 tablet by mouth 1 time a day as needed for Erectile Dysfunction. 10 Tablet 0    clopidogrel (PLAVIX) 75 MG Tab TAKE 1 TABLET BY MOUTH EVERY  Tablet 2    Magnesium Gluconate (MAGNESIUM 27 PO) Take  by mouth 2 times a day. 2 tab every day      Ascorbic Acid (VITAMIN C) 1000 MG Tab Take 1,000 mg by mouth. 2 tabs everyday      Cholecalciferol (VITAMIN D-3 PO) Take  by mouth.      ketoconazole (NIZORAL) 2 % Cream Apply  topically every day.      betamethasone dipropionate (DIPROLENE) 0.05 % Ointment Apply to affected area twice daily as needed 50 g 2    diphenhydrAMINE  (BENADRYL) 25 MG Tab Take 1 Each by mouth every 8 hours. 10 Tab 0    EPINEPHrine (EPIPEN) 0.3 MG/0.3ML SOAJ solution for injection 1 Syringe by Injection route Once PRN (for severe allergic reaction) for up to 1 dose. 1 Each 1    carvedilol (COREG) 12.5 MG Tab Take 1 Tablet by mouth 2 times a day with meals. 180 Tablet 0    chlorthalidone (HYGROTON) 25 MG Tab TAKE 1 TABLET BY MOUTH EVERY DAY 90 Tablet 2     No current facility-administered medications for this visit.   [2]   Past Surgical History:  Procedure Laterality Date    OTHER ABDOMINAL SURGERY      colon resection

## 2025-07-08 NOTE — PROGRESS NOTES
CC: Follow-up on falls    HPI:  Morris presents with the following    1. Multiple falls  6/20/2025:Patient with a history of drug-induced polyneuropathy presents to clinic with a history of 3 recent falls.  He currently resides at the [address] [place] [address] living and over the course of the last few weeks he experienced falls, using his four-wheel walker.  He states that his knees just buckled and he fell down.  He has been using a walker for at least the last 4 to 5 years however has been also using a wheelchair more often.  When he fell he did not experience any loss of consciousness but he had difficulty getting up due to weakness.  Patient reached out to a friend, neurologist Dr. Mosley who initiated a workup to include x-rays of his lumbar spine and bilateral hips.  Labs also ordered to include a sed rate 7, CPK slightly elevated at 189, aldolase 4.0, immunochemistry is within normal limits, C-reactive protein within normal limits, autoimmune workup, electrophoresis negative.  Vitamin B12 low at 225.  Lumbar x-rays shows degenerative joint disease, multilevel.  Unremarkable x-rays of the pelvis.  Patient started vitamin B12 supplementation.    Physical therapy was recommended.  Was also noted that patient has not had any breaks on his four-wheel walker for about 2 years and could be contributing to his multiple falls.  Requesting an order for a new four-wheel walker with a seat.  Patient has had his current walker for the last 7-8 years.    7/8/2025:.  Patient has completed 4 sessions of physical therapy and feels more strength in his lower extremities with improved balance.  He will follow-up with Dr. Bell after he has completed at least a month of physical therapy.  Patient started vitamin B12 supplementation and also magnesium for lower extremity cramping.    2. Weakness of lower extremity, unspecified laterality  Patient has benefited from his first 4 sessions of physical therapy.      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[facility]) 02/21/2023    Onychomycosis 06/07/2022    Hiatal hernia 05/31/2022    Drug-induced polyneuropathy ([facility]) 05/31/2022    Overweight with body mass index (BMI) of 27 to 27.9 in adult 05/31/2022    Dry mouth 02/08/2022    Type 2 diabetes mellitus with circulatory disorder, without long-term current use of insulin ([facility]) 08/03/2021    Vitamin D deficiency 08/03/2021    Other hemorrhoids 02/02/2021    H/O colon cancer, stage I 02/02/2021    Hyperlipidemia 11/07/2019    Essential hypertension 11/07/2019    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 11/07/2019    Angioedema 02/13/2016       Current Medications[1]      Allergies as of 07/08/2025 - Reviewed 07/08/2025   Allergen Reaction Noted    Peanut-derived Anaphylaxis 12/01/2014    Food  02/13/2016    Iodine  12/03/2009        Social History     Socioeconomic History    Marital status:      Spouse name: Not on file    Number of children: Not on file    Years of education: Not on file    Highest education level: Not on file   Occupational History    Not on file   Tobacco Use    Smoking status: Former    Smokeless tobacco: Never    Tobacco comments:     quit 23 yrs ago   Vaping Use    Vaping status: Never Used   Substance and Sexual Activity    Alcohol use: Yes     Alcohol/week: 4.8 oz     Types: 8 Standard drinks or equivalent per week     Comment: daily cocktail (vodka rocks) martini every night    Drug use: No    Sexual activity: Not Currently   Other Topics Concern    Not on file   Social History Narrative    Not on file     Social Drivers of Health     Financial Resource Strain: Low Risk  (3/22/2024)    Overall Financial Resource  "Strain (CARDIA)     Difficulty of Paying Living Expenses: Not hard at all   Food Insecurity: No Food Insecurity (3/22/2024)    Hunger Vital Sign     Worried About Running Out of Food in the Last Year: Never true     Ran Out of Food in the Last Year: Never true   Transportation Needs: No Transportation Needs (3/22/2024)    PRAPARE - Transportation     Lack of Transportation (Medical): No     Lack of Transportation (Non-Medical): No   Physical Activity: Not on file   Stress: Not on file   Social Connections: Not on file   Intimate Partner Violence: Not on file   Housing Stability: Not on file       History reviewed. No pertinent family history.    Past Surgical History[2]    ROS: Positive ROS per HPI.  Denies any Headache,Chest pain,  Shortness of breath,  Abdominal pain, Changes of bowel or bladder, Lower ext edema, Fevers, Nights sweats, Weight Changes, Focal weakness or numbness.  All other systems are negative.    /62 (BP Location: Right arm, Patient Position: Sitting)   Pulse (!) 54   Temp 36.9 °C (98.5 °F) (Temporal)   Resp 14   Ht 1.676 m (5' 6\")   Wt 68.7 kg (151 lb 8 oz)   SpO2 94%   BMI 24.45 kg/m²      Constitutional: Alert, no distress, well-groomed.  Skin: Warm, dry, good turgor, no rashes in visible areas.  Eye: Equal, round and reactive, conjunctiva clear, lids normal.  ENMT: Lips without lesions, good dentition, moist mucous membranes.  Neck: Trachea midline, no masses, no thyromegaly.  Respiratory: Unlabored respiratory effort, no cough.  Abdomen: Soft, no gross masses.  MSK: Normal gait, moves all extremities.  Neuro: Grossly non-focal. No cranial nerve deficit. Strength and sensation intact.   Psych: Alert and oriented x3, normal affect and mood.    Assessment and Plan.   85 y.o. male presenting with the following.     1. Multiple falls (Primary)  Continue with physical therapy for 4 more sessions and then follow-up with neurology.  Continue with vitamin B12 supplementation as well as " magnesium which has been beneficial.    2. Weakness of lower extremity, unspecified laterality  DME paperwork filled out.         [1]   Current Outpatient Medications   Medication Sig Dispense Refill    carvedilol (COREG) 12.5 MG Tab Take 1 Tablet by mouth 2 times a day with meals. 180 Tablet 0    chlorthalidone (HYGROTON) 25 MG Tab TAKE 1 TABLET BY MOUTH EVERY DAY 90 Tablet 2    Cyanocobalamin (VITAMIN B-12) 3000 MCG SL Tab Place 3,000 mcg under the tongue 2 times a day.      lisinopril (PRINIVIL) 5 MG Tab Take 1 Tablet by mouth every day. 100 Tablet 2    atorvastatin (LIPITOR) 80 MG tablet TAKE 1 TABLET BY MOUTH EVERY  Tablet 1    metFORMIN ER (GLUCOPHAGE XR) 500 MG TABLET SR 24 HR TAKE 1 TABLET BY MOUTH EVERY  Tablet 3    tadalafil 20 MG tablet Take 1 Tablet by mouth as needed for Erectile Dysfunction. 10 Tablet 3    ezetimibe (ZETIA) 10 MG Tab TAKE 1 TABLET BY MOUTH EVERY  Tablet 3    omeprazole (PRILOSEC) 20 MG delayed-release capsule Take 1 Capsule by mouth every day. 100 Capsule 2    tadalafil (CIALIS) 10 MG tablet Take 1 tablet by mouth 1 time a day as needed for Erectile Dysfunction. 10 Tablet 0    clopidogrel (PLAVIX) 75 MG Tab TAKE 1 TABLET BY MOUTH EVERY  Tablet 2    Magnesium Gluconate (MAGNESIUM 27 PO) Take  by mouth 2 times a day. 2 tab every day      Ascorbic Acid (VITAMIN C) 1000 MG Tab Take 1,000 mg by mouth. 2 tabs everyday      Cholecalciferol (VITAMIN D-3 PO) Take  by mouth.      ketoconazole (NIZORAL) 2 % Cream Apply  topically every day.      betamethasone dipropionate (DIPROLENE) 0.05 % Ointment Apply to affected area twice daily as needed 50 g 2    diphenhydrAMINE (BENADRYL) 25 MG Tab Take 1 Each by mouth every 8 hours. 10 Tab 0    EPINEPHrine (EPIPEN) 0.3 MG/0.3ML SOAJ solution for injection 1 Syringe by Injection route Once PRN (for severe allergic reaction) for up to 1 dose. 1 Each 1     No current facility-administered medications for this visit.   [2]   Past  Surgical History:  Procedure Laterality Date    OTHER ABDOMINAL SURGERY      colon resection

## 2025-07-14 ENCOUNTER — OFFICE VISIT (OUTPATIENT)
Dept: CARDIOLOGY | Facility: MEDICAL CENTER | Age: 85
End: 2025-07-14
Payer: MEDICARE

## 2025-07-14 ENCOUNTER — PHARMACY VISIT (OUTPATIENT)
Dept: PHARMACY | Facility: MEDICAL CENTER | Age: 85
End: 2025-07-14
Payer: COMMERCIAL

## 2025-07-14 VITALS
RESPIRATION RATE: 18 BRPM | SYSTOLIC BLOOD PRESSURE: 120 MMHG | DIASTOLIC BLOOD PRESSURE: 64 MMHG | OXYGEN SATURATION: 94 % | BODY MASS INDEX: 24.59 KG/M2 | WEIGHT: 153 LBS | HEIGHT: 66 IN | HEART RATE: 55 BPM

## 2025-07-14 DIAGNOSIS — I70.0 AORTIC ATHEROSCLEROSIS (HCC): ICD-10-CM

## 2025-07-14 DIAGNOSIS — Z79.899 LONG TERM CURRENT USE OF DIURETIC: ICD-10-CM

## 2025-07-14 DIAGNOSIS — I71.40 ABDOMINAL AORTIC ANEURYSM (AAA), UNSPECIFIED PART, UNSPECIFIED WHETHER RUPTURED (HCC): ICD-10-CM

## 2025-07-14 DIAGNOSIS — I73.9 PAD (PERIPHERAL ARTERY DISEASE) (HCC): ICD-10-CM

## 2025-07-14 DIAGNOSIS — E78.49 OTHER HYPERLIPIDEMIA: ICD-10-CM

## 2025-07-14 DIAGNOSIS — I10 ESSENTIAL HYPERTENSION: Primary | ICD-10-CM

## 2025-07-14 DIAGNOSIS — R93.1 AGATSTON CORONARY ARTERY CALCIUM SCORE GREATER THAN 400: ICD-10-CM

## 2025-07-14 PROCEDURE — 99214 OFFICE O/P EST MOD 30 MIN: CPT

## 2025-07-14 PROCEDURE — 3074F SYST BP LT 130 MM HG: CPT

## 2025-07-14 PROCEDURE — 3078F DIAST BP <80 MM HG: CPT

## 2025-07-14 PROCEDURE — 99213 OFFICE O/P EST LOW 20 MIN: CPT

## 2025-07-14 ASSESSMENT — ENCOUNTER SYMPTOMS
NEAR-SYNCOPE: 0
LOSS OF BALANCE: 1
LIGHT-HEADEDNESS: 0
SHORTNESS OF BREATH: 0
PND: 0
PALPITATIONS: 0
ORTHOPNEA: 0
SYNCOPE: 0
HEADACHES: 0
DYSPNEA ON EXERTION: 0
DIZZINESS: 0

## 2025-07-14 ASSESSMENT — FIBROSIS 4 INDEX: FIB4 SCORE: 4.25

## 2025-07-14 NOTE — PROGRESS NOTES
Chief Complaint   Patient presents with    Hypertension    Hyperlipidemia    Aortic Atherosclerosis          Subjective:   Morris Deng is a 85 y.o. male who presents today for follow-up.     Patient of Dr. Samuel.  Current medical problems include CAC on CT, HTN, HLD, DM2, small infrarenal aortic aneurysm. Their last clinic visit was 3/28/2025 with Dr. Samuel.    Today's visit:  Patient presents today for follow up with his girlfriend Carla. Patient reports that he is doing overall well from a cardiac perspective.   He has no chest pain, shortness of breath, edema, orthopnea, PND, dizziness/lightheadedness, or palpitations. He reports his blood pressure has been well controlled. He is doing physical therapy two times a week currently and when they check it with his sessions it is similar to our in office reading. He does report some progression of his neuropathy and weakness in his legs over the last two months and has taken two falls when using his walker longer distances. He followed up with neurology and had recent labs done. He does have history of chemotherapy for colon cancer. He is taking his blood pressure as prescribed.       Cardiovascular Risk Factors:  1. Smoking status: Former smoker  2. Type II Diabetes Mellitus: Yes   Lab Results   Component Value Date/Time    HBA1C 6.2 (A) 02/18/2025 10:58 AM    HBA1C 6.1 (A) 03/22/2024 01:40 PM     3. Hypertension: Yes  4. Dyslipidemia: Yes   Cholesterol,Tot   Date Value Ref Range Status   04/03/2025 115 100 - 199 mg/dL Final     LDL   Date Value Ref Range Status   04/03/2025 36 <100 mg/dL Final     HDL   Date Value Ref Range Status   04/03/2025 67 >=40 mg/dL Final     Triglycerides   Date Value Ref Range Status   04/03/2025 62 0 - 149 mg/dL Final       Past Medical History[1]      History reviewed. No pertinent family history.      Social History[2]      Allergies[3]      Current Outpatient Medications   Medication Sig    carvedilol (COREG) 12.5  MG Tab Take 1 Tablet by mouth 2 times a day with meals.    chlorthalidone (HYGROTON) 25 MG Tab TAKE 1 TABLET BY MOUTH EVERY DAY    Cyanocobalamin (VITAMIN B-12) 3000 MCG SL Tab Place 3,000 mcg under the tongue 2 times a day.    lisinopril (PRINIVIL) 5 MG Tab Take 1 Tablet by mouth every day.    atorvastatin (LIPITOR) 80 MG tablet TAKE 1 TABLET BY MOUTH EVERY DAY    metFORMIN ER (GLUCOPHAGE XR) 500 MG TABLET SR 24 HR TAKE 1 TABLET BY MOUTH EVERY DAY    tadalafil 20 MG tablet Take 1 Tablet by mouth as needed for Erectile Dysfunction.    ezetimibe (ZETIA) 10 MG Tab TAKE 1 TABLET BY MOUTH EVERY DAY    omeprazole (PRILOSEC) 20 MG delayed-release capsule Take 1 Capsule by mouth every day.    clopidogrel (PLAVIX) 75 MG Tab TAKE 1 TABLET BY MOUTH EVERY DAY    Magnesium Gluconate (MAGNESIUM 27 PO) Take  by mouth 2 times a day. 2 tab every day    Ascorbic Acid (VITAMIN C) 1000 MG Tab Take 1,000 mg by mouth. 2 tabs everyday    Cholecalciferol (VITAMIN D-3 PO) Take  by mouth.    ketoconazole (NIZORAL) 2 % Cream Apply  topically every day.    betamethasone dipropionate (DIPROLENE) 0.05 % Ointment Apply to affected area twice daily as needed    diphenhydrAMINE (BENADRYL) 25 MG Tab Take 1 Each by mouth every 8 hours.    EPINEPHrine (EPIPEN) 0.3 MG/0.3ML SOAJ solution for injection 1 Syringe by Injection route Once PRN (for severe allergic reaction) for up to 1 dose.         Review of Systems   Constitutional: Negative for malaise/fatigue.   Cardiovascular:  Negative for chest pain, dyspnea on exertion, leg swelling, near-syncope, orthopnea, palpitations, paroxysmal nocturnal dyspnea and syncope.   Respiratory:  Negative for shortness of breath.    Musculoskeletal:  Positive for muscle weakness.   Neurological:  Positive for loss of balance. Negative for dizziness, headaches and light-headedness.           Objective:   /64 (BP Location: Left arm, Patient Position: Sitting, BP Cuff Size: Adult)   Pulse (!) 55   Resp 18   Ht  "1.676 m (5' 6\")   Wt 69.4 kg (153 lb)   SpO2 94%  Body mass index is 24.69 kg/m².         Physical Exam  Vitals reviewed.   Constitutional:       General: He is not in acute distress.     Appearance: Normal appearance.   HENT:      Head: Normocephalic and atraumatic.   Cardiovascular:      Rate and Rhythm: Normal rate and regular rhythm.      Pulses: Normal pulses.      Heart sounds: No murmur heard.  Pulmonary:      Effort: Pulmonary effort is normal. No respiratory distress.      Breath sounds: Normal breath sounds.   Musculoskeletal:      Right lower leg: No edema.      Left lower leg: No edema.   Neurological:      Mental Status: He is alert and oriented to person, place, and time.      Gait: Gait normal.   Psychiatric:         Behavior: Behavior normal.             Lab Results   Component Value Date/Time    SODIUM 139 04/03/2025 09:57 AM    POTASSIUM 4.7 04/03/2025 09:57 AM    CHLORIDE 104 04/03/2025 09:57 AM    CO2 23 04/03/2025 09:57 AM    GLUCOSE 105 (H) 04/03/2025 09:57 AM    BUN 22 04/03/2025 09:57 AM    CREATININE 1.25 04/03/2025 09:57 AM      Lab Results   Component Value Date/Time    WBC 4.8 04/03/2025 09:57 AM    RBC 4.37 (L) 04/03/2025 09:57 AM    HEMOGLOBIN 14.6 04/03/2025 09:57 AM    HEMATOCRIT 44.3 04/03/2025 09:57 AM    .4 (H) 04/03/2025 09:57 AM    MCH 33.4 (H) 04/03/2025 09:57 AM    MCHC 33.0 04/03/2025 09:57 AM    MPV 10.2 04/03/2025 09:57 AM    NEUTSPOLYS 64.30 04/03/2025 09:57 AM    LYMPHOCYTES 22.00 04/03/2025 09:57 AM    MONOCYTES 11.20 04/03/2025 09:57 AM    EOSINOPHILS 1.90 04/03/2025 09:57 AM    BASOPHILS 0.40 04/03/2025 09:57 AM      Lab Results   Component Value Date/Time    CHOLSTRLTOT 115 04/03/2025 09:57 AM    LDL 36 04/03/2025 09:57 AM    HDL 67 04/03/2025 09:57 AM    TRIGLYCERIDE 62 04/03/2025 09:57 AM       No results found for: \"TROPONINT\"  No results found for: \"NTPROBNP\"  Assessment:   1. Essential hypertension  - Comp Metabolic Panel; Future    2. Abdominal aortic " aneurysm (AAA), unspecified part, unspecified whether ruptured (HCC)  - Comp Metabolic Panel; Future    3. Other hyperlipidemia    4. 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 Aortic atherosclerosis (HCC)    6. Long term current use of diuretic    7. PAD (peripheral artery disease) ([facility])        Medical Decision Making:  Today's Assessment / Plan:   AAA  -recent ultrasound showed chronic, stable dissection  -continue with surveillance imaging and strict blood pressure control    CAC score > 400  Aortic atherosclerosis  Hyperlipidemia  -Most recent LDL 36  -due to increase leg weakness and muscle weakness discussed a two week statin holiday to see if improves his symptoms. Pending follow up can restart current dose or decrease dosage. Will follow up in two weeks via Mychart  -Goal of less than 70  -Check lipid panel in 6 months    Hypertension  - Good control  - continue carvedilol 12.5 mg twice a day  -continue lisinopril 5 mg daily  -continue chlorthalidone 25 mg daily  - goal < 130/80  -repeat CMP to monitor kidney function    Return in about 6 months (around 1/14/2026).  Sooner if problems.    KEVIN Moran          [1]   Past Medical History:  Diagnosis Date    Cancer ([facility])     colon    Cough with exposure to COVID-19 virus 06/23/2022    Dry mouth 02/08/2022    H/O colon cancer, stage I 02/02/2021    Hypertension     Multiple falls 06/24/2025    Neuropathy 02/02/2021    Onychomycosis 06/07/2022    Other hemorrhoids 02/02/2021    Other male erectile dysfunction 04/04/2025    Type 2 diabetes mellitus with circulatory disorder, without long-term current use of insulin ([facility]) 08/03/2021    Vitamin D deficiency 08/03/2021    Weight loss 02/18/2025   [2]   Social History  Tobacco Use    Smoking status: Former    Smokeless tobacco: Never    Tobacco comments:     quit 23 yrs ago   Vaping Use    Vaping status: Never Used   Substance Use Topics    Alcohol use: Yes     Alcohol/week: 4.8 oz      Types: 8 Standard drinks or equivalent per week     Comment: daily cocktail (vodka rocks) martini every night    Drug use: No   [3]   Allergies  Allergen Reactions    Peanut-Derived Anaphylaxis    Food      Cereals: Barley, Buckwheat , corn, oat, wheat   Meats: turkey  Fruits: apple, cantaloupe/ muskmelon, grape white , lemon, orange, peach, pear, watermelon.  Vegetables: Bean, green; bean, lima; cabbage, carrot,celery, lettuce, pea, pepper, green; soybean  Seafood: lobster , Oyster, shrimp  Nuts/ seeds: [place], Cashew, coconut, Filbert/Hazelnut,Peanut, Pecan, Pistachio nut,Sesame seed/oil, walnut, flaxseed  Spices/other: Chocolate/ cacao bean, cumin, dill, garlic, mustard,pepper,Histamine control      Iodine

## 2025-07-23 ENCOUNTER — APPOINTMENT (OUTPATIENT)
Dept: URBAN - METROPOLITAN AREA CLINIC 4 | Facility: CLINIC | Age: 85
Setting detail: DERMATOLOGY
End: 2025-07-23

## 2025-07-23 DIAGNOSIS — Z85.820 PERSONAL HISTORY OF MALIGNANT MELANOMA OF SKIN: ICD-10-CM

## 2025-07-23 DIAGNOSIS — Z85.828 PERSONAL HISTORY OF OTHER MALIGNANT NEOPLASM OF SKIN: ICD-10-CM

## 2025-07-23 DIAGNOSIS — D22 MELANOCYTIC NEVI: ICD-10-CM

## 2025-07-23 DIAGNOSIS — D18.0 HEMANGIOMA: ICD-10-CM

## 2025-07-23 DIAGNOSIS — L57.8 OTHER SKIN CHANGES DUE TO CHRONIC EXPOSURE TO NONIONIZING RADIATION: ICD-10-CM

## 2025-07-23 DIAGNOSIS — L81.4 OTHER MELANIN HYPERPIGMENTATION: ICD-10-CM

## 2025-07-23 DIAGNOSIS — L57.0 ACTINIC KERATOSIS: ICD-10-CM

## 2025-07-23 DIAGNOSIS — Q82.5 CONGENITAL NON-NEOPLASTIC NEVUS: ICD-10-CM

## 2025-07-23 DIAGNOSIS — L82.1 OTHER SEBORRHEIC KERATOSIS: ICD-10-CM

## 2025-07-23 PROBLEM — D18.01 HEMANGIOMA OF SKIN AND SUBCUTANEOUS TISSUE: Status: ACTIVE | Noted: 2025-07-23

## 2025-07-23 PROBLEM — D22.5 MELANOCYTIC NEVI OF TRUNK: Status: ACTIVE | Noted: 2025-07-23

## 2025-07-23 PROCEDURE — ? COUNSELING

## 2025-07-23 PROCEDURE — ? LIQUID NITROGEN

## 2025-07-23 PROCEDURE — ? DIAGNOSIS COMMENT

## 2025-07-23 ASSESSMENT — LOCATION DETAILED DESCRIPTION DERM
LOCATION DETAILED: LEFT MEDIAL SUPERIOR CHEST
LOCATION DETAILED: RIGHT DISTAL DORSAL FOREARM
LOCATION DETAILED: RIGHT MID-UPPER BACK
LOCATION DETAILED: LEFT SUPERIOR FOREHEAD
LOCATION DETAILED: LEFT INFERIOR CENTRAL MALAR CHEEK
LOCATION DETAILED: LEFT DISTAL DORSAL FOREARM
LOCATION DETAILED: RIGHT PROXIMAL DORSAL FOREARM
LOCATION DETAILED: RIGHT CLAVICULAR SKIN
LOCATION DETAILED: LEFT FOREHEAD
LOCATION DETAILED: RIGHT CENTRAL FRONTAL SCALP
LOCATION DETAILED: RIGHT SUPERIOR POSTAURICULAR SKIN
LOCATION DETAILED: RIGHT INFERIOR UPPER BACK
LOCATION DETAILED: NASAL TIP
LOCATION DETAILED: LEFT ANTERIOR PROXIMAL UPPER ARM
LOCATION DETAILED: RIGHT ANTERIOR PROXIMAL UPPER ARM
LOCATION DETAILED: RIGHT ANTERIOR DISTAL THIGH
LOCATION DETAILED: INFERIOR THORACIC SPINE
LOCATION DETAILED: LEFT ANTERIOR DISTAL THIGH
LOCATION DETAILED: RIGHT SUPERIOR MEDIAL UPPER BACK
LOCATION DETAILED: RIGHT CENTRAL MALAR CHEEK

## 2025-07-23 ASSESSMENT — LOCATION SIMPLE DESCRIPTION DERM
LOCATION SIMPLE: LEFT UPPER ARM
LOCATION SIMPLE: RIGHT CHEEK
LOCATION SIMPLE: LEFT FOREHEAD
LOCATION SIMPLE: RIGHT FOREARM
LOCATION SIMPLE: SCALP
LOCATION SIMPLE: CHEST
LOCATION SIMPLE: UPPER BACK
LOCATION SIMPLE: RIGHT THIGH
LOCATION SIMPLE: RIGHT SCALP
LOCATION SIMPLE: LEFT FOREARM
LOCATION SIMPLE: RIGHT UPPER ARM
LOCATION SIMPLE: RIGHT UPPER BACK
LOCATION SIMPLE: LEFT THIGH
LOCATION SIMPLE: RIGHT CLAVICULAR SKIN
LOCATION SIMPLE: LEFT CHEEK
LOCATION SIMPLE: NOSE

## 2025-07-23 ASSESSMENT — LOCATION ZONE DERM
LOCATION ZONE: NOSE
LOCATION ZONE: FACE
LOCATION ZONE: SCALP
LOCATION ZONE: TRUNK
LOCATION ZONE: LEG
LOCATION ZONE: ARM

## 2025-08-01 ENCOUNTER — TELEPHONE (OUTPATIENT)
Dept: HEALTH INFORMATION MANAGEMENT | Facility: OTHER | Age: 85
End: 2025-08-01
Payer: MEDICARE

## 2025-08-18 PROCEDURE — RXMED WILLOW AMBULATORY MEDICATION CHARGE: Performed by: INTERNAL MEDICINE

## 2025-08-19 ENCOUNTER — PHARMACY VISIT (OUTPATIENT)
Dept: PHARMACY | Facility: MEDICAL CENTER | Age: 85
End: 2025-08-19
Payer: COMMERCIAL

## 2025-08-27 ENCOUNTER — HOSPITAL ENCOUNTER (OUTPATIENT)
Dept: LAB | Facility: MEDICAL CENTER | Age: 85
End: 2025-08-27
Attending: PSYCHIATRY & NEUROLOGY
Payer: MEDICARE

## 2025-08-27 PROCEDURE — 82784 ASSAY IGA/IGD/IGG/IGM EACH: CPT

## 2025-08-27 PROCEDURE — 86334 IMMUNOFIX E-PHORESIS SERUM: CPT

## 2025-08-27 PROCEDURE — 36415 COLL VENOUS BLD VENIPUNCTURE: CPT

## 2025-08-27 PROCEDURE — 84155 ASSAY OF PROTEIN SERUM: CPT

## 2025-08-27 PROCEDURE — 84165 PROTEIN E-PHORESIS SERUM: CPT

## 2025-08-29 LAB
IGA SERPL-MCNC: 165 MG/DL (ref 68–408)
IGG SERPL-MCNC: 830 MG/DL (ref 768–1632)
IGM SERPL-MCNC: 193 MG/DL (ref 35–263)

## 2025-08-30 LAB
ALBUMIN SERPL ELPH-MCNC: 3.79 G/DL (ref 3.75–5.01)
ALPHA1 GLOB SERPL ELPH-MCNC: 0.21 G/DL (ref 0.19–0.46)
ALPHA2 GLOB SERPL ELPH-MCNC: 0.6 G/DL (ref 0.48–1.05)
B-GLOBULIN SERPL ELPH-MCNC: 0.65 G/DL (ref 0.48–1.1)
GAMMA GLOB SERPL ELPH-MCNC: 0.85 G/DL (ref 0.62–1.51)
INTERPRETATION SERPL IFE-IMP: ABNORMAL
INTERPRETATION SERPL IFE-IMP: ABNORMAL
MONOCLONAL PROTEIN NL11656: ABNORMAL G/DL
PATHOLOGY STUDY: ABNORMAL
PROT SERPL-MCNC: 6.1 G/DL (ref 6.3–8.2)

## 2025-11-11 ENCOUNTER — APPOINTMENT (OUTPATIENT)
Dept: MEDICAL GROUP | Facility: PHYSICIAN GROUP | Age: 85
End: 2025-11-11
Payer: MEDICARE